# Patient Record
Sex: FEMALE | Employment: OTHER | URBAN - METROPOLITAN AREA
[De-identification: names, ages, dates, MRNs, and addresses within clinical notes are randomized per-mention and may not be internally consistent; named-entity substitution may affect disease eponyms.]

---

## 2017-12-22 ENCOUNTER — HOSPITAL ENCOUNTER (OUTPATIENT)
Dept: LAB | Age: 72
Discharge: HOME | End: 2017-12-22

## 2019-02-12 ENCOUNTER — OFFICE VISIT (OUTPATIENT)
Dept: NEUROLOGY | Age: 74
End: 2019-02-12

## 2019-02-12 VITALS
DIASTOLIC BLOOD PRESSURE: 68 MMHG | HEIGHT: 63 IN | OXYGEN SATURATION: 97 % | HEART RATE: 66 BPM | RESPIRATION RATE: 20 BRPM | SYSTOLIC BLOOD PRESSURE: 100 MMHG

## 2019-02-12 DIAGNOSIS — R29.898 WEAKNESS OF BOTH HANDS: ICD-10-CM

## 2019-02-12 DIAGNOSIS — R20.2 NUMBNESS AND TINGLING IN BOTH HANDS: Primary | ICD-10-CM

## 2019-02-12 DIAGNOSIS — R26.81 UNSTEADY GAIT: ICD-10-CM

## 2019-02-12 DIAGNOSIS — R29.898 WEAKNESS OF BOTH LEGS: ICD-10-CM

## 2019-02-12 DIAGNOSIS — R20.0 NUMBNESS AND TINGLING IN BOTH HANDS: Primary | ICD-10-CM

## 2019-02-12 DIAGNOSIS — F41.8 TEST ANXIETY: ICD-10-CM

## 2019-02-12 RX ORDER — AMLODIPINE BESYLATE 10 MG/1
10 TABLET ORAL
COMMUNITY
Start: 2019-01-29

## 2019-02-12 RX ORDER — ATORVASTATIN CALCIUM 20 MG/1
20 TABLET, FILM COATED ORAL
COMMUNITY
Start: 2018-12-31

## 2019-02-12 RX ORDER — INSULIN GLARGINE 100 [IU]/ML
50 INJECTION, SOLUTION SUBCUTANEOUS
COMMUNITY
Start: 2018-12-12

## 2019-02-12 RX ORDER — COLCHICINE 0.6 MG/1
0.6 TABLET ORAL AS NEEDED
Status: ON HOLD | COMMUNITY
Start: 2018-12-13 | End: 2022-07-19

## 2019-02-12 RX ORDER — GLIPIZIDE 5 MG/1
5 TABLET ORAL 2 TIMES DAILY
COMMUNITY
Start: 2018-12-11 | End: 2019-06-10

## 2019-02-12 RX ORDER — INDOMETHACIN 50 MG/1
50 CAPSULE ORAL
COMMUNITY
Start: 2019-01-29 | End: 2021-10-18 | Stop reason: ALTCHOICE

## 2019-02-12 RX ORDER — CARVEDILOL 12.5 MG/1
12.5 TABLET ORAL 2 TIMES DAILY
COMMUNITY
Start: 2018-12-07

## 2019-02-12 RX ORDER — DIAZEPAM 5 MG/1
TABLET ORAL
Qty: 1 TAB | Refills: 0 | Status: SHIPPED | OUTPATIENT
Start: 2019-02-12 | End: 2019-02-22

## 2019-02-12 NOTE — PROGRESS NOTES
Name:  Torrey Whitlock      :  1945    PCP:   Easton Price MD      Referring:  Misty Tyson MD/ PCP  MRN:   [id number]    Chief Complaint:   Chief Complaint   Patient presents with    Pain (Chronic)     Hand Pain       HISTORY OF PRESENT ILLNESS:     This is a 68 y.o. female with PMHx DM2, OA, MI (1998, PTCA, CABG), HTN, HLD, Gout, Fibroids. She is referred for evaluation of hand pains, hand weakness, and episodic leg pains. Pt says since 2018, started to have \"extreme burning\" in left hand (front/ back), not going up the arm. Over time she says it's become more numb, weaker, hard to . About 3 weeks after left hand started, developed same symptoms in right hand and at this point, right hand just as bad as left hand. No numbness up or down either arm, but occasionally right arm hurts (mostly from shoulder to elbow). She does have a hx of left shoulder problems and has been doing PT for that. Separately, she describes that legs started feeling weak around 1.5 months ago, fluctuating over that time. Denies any prior hx of numbness/ tingling/ burring in feet (ie DM neuropathy). She says she has historically been a slow walker but more recently her gait has worsened to where she's walking \"extremely slow,\" and sometimes when she turns she stumbles but avoids falling. Denies any any numbness in feet moving up legs. Says she was never told or diagnosed as having neuropathy in the past.  She repots that she's recently started to have a flare of pain across her lower back, not radiating down either leg. No bowel or bladder incontinence, though she report having chronic urinary urgency. Reviewed clinic note from PCP (19). Pt continued describing burning/ numbness feeling in both hands, not better after Physical Therapy. Was becoming harder to hold things, button a shirt.   She also complained of burning pain in the back of the left lower leg x 3 weeks, and sometimes in the back of right leg. Pt said that sometimes it felt like her legs were going to give out while walking, had to focus on her walking so she wouldn't fall. Complete Review of Systems:  Falls (\"buckle\"), joint pain, leg swelling, muscle pain, muscle weakness, snoring, vertigo (\"?\"); otherwise as noted in HPI     Allergies not on file  No past medical history on file. Current Outpatient Medications   Medication Sig Dispense Refill    carvedilol (COREG) 12.5 mg tablet       amLODIPine (NORVASC) 10 mg tablet       atorvastatin (LIPITOR) 20 mg tablet       colchicine 0.6 mg tablet       LANTUS SOLOSTAR U-100 INSULIN 100 unit/mL (3 mL) inpn       indomethacin (INDOCIN) 50 mg capsule       glipiZIDE (GLUCOTROL) 5 mg tablet       diazePAM (VALIUM) 5 mg tablet Sign MRI consent for first.  Then take 1 tab, 20 minutes before MRI test.  For anxiety-claustrophobic 1 Tab 0     No past surgical history on file. No family history on file. Social History     Socioeconomic History    Marital status:      Spouse name: Not on file    Number of children: Not on file    Years of education: Not on file    Highest education level: Not on file   Social Needs    Financial resource strain: Not on file    Food insecurity - worry: Not on file    Food insecurity - inability: Not on file    Transportation needs - medical: Not on file   [employer] needs - non-medical: Not on file   Occupational History    Not on file   Tobacco Use    Smoking status: Not on file   Substance and Sexual Activity    Alcohol use: Not on file    Drug use: Not on file    Sexual activity: Not on file   Other Topics Concern    Not on file   Social History Narrative    Not on file       PHYSICAL EXAM  Vitals:    02/12/19 0945   BP: 100/68   Pulse: 66   Resp: 20   SpO2: 97%   Height: 5' 3\" (1.6 m)       General:  Alert, cooperative, NAD   Head:  Normocephalic, atraumatic.    Eyes:  Conjunctivae/corneas clear   Lungs:  Heart:  Not examined, not relevant  Not examined, not relevant   Extremities: Mild edema in both lower legs   Skin: No rashes    Neurologic Exam       Language: normal  Memory:  Alert, oriented to person, place, situation    Cranial Nerves:  I: smell Not tested   II: visual fields Full to confrontation   II: pupils Equal, round, reactive to light   II: optic disc Not examined, not relevant   III,VII: ptosis none   III,IV,VI: extraocular muscles  normal   V: facial light touch sensation  normal   VII: facial muscle function  symmetric   VIII: hearing symmetric   IX: soft palate elevation  Not examined, not relevant   XI: sternocleidomastoid strength 5/5   XII: tongue  Not examined, not relevant      Motor: Symmetric, normal tone in all extremities. Symmetric bulk throughout. No muscle tenderness to touch. Upper exts: proximal strength 5/5, 4/5 strength in left hand, 4+/ 5 strength in right hand. Lower exts: 5/5 proximally and distally. Sensory:   Upper exts: intact temperature, prick, LT in all fingers, mild reduced vibration in left hand compared to elbow. Normal vibration in right upper ext. Lower exts: intact LT, prick, temp, and vibration in both feet and knees. Cerebellar: no rest, postural, or intention tremor  Reflexes: 1+ throughout  Plantar response: neutral bilaterally    Gait: slow gait, not shuffling, slightly unsteady. Mild difficulty with tandem gait. Romberg negative       ASSESSMENT AND PLAN    ICD-10-CM ICD-9-CM    1. Numbness and tingling in both hands R20.0 782.0 MRI CERV SPINE W WO CONT    R20.2  EMG LIMITED   2. Weakness of both hands R29.898 729.89 MRI CERV SPINE W WO CONT      EMG LIMITED      CK   3. Weakness of both legs R29.898 729.89 MRI CERV SPINE W WO CONT      EMG LIMITED      CK   4. Unsteady gait R26.81 781.2 MRI CERV SPINE W WO CONT      EMG LIMITED   5.  Test anxiety F41.8 300.09 diazePAM (VALIUM) 5 mg tablet       Burning pain in hands, reduced hand dexterity, occasional pain down right arm since October 2018. 5-6 week hx of slower gait, unsteady gait, episodes of legs almost buckling. Denies any numbness or pain radiating down either leg or or in the feet. Reports lower back has started to hurt (has had episodes of back pain in the past, last time was 1 year ago). DDx: CTS vs Cervial radiculopathy vs Cervical spinal stenosis or cord compress or cord lesion causing legs to be week and pain-weakness complaints in hands. Check MRI C-spine and EMG/ NCS both arms and 1 leg. Pt reports significant anxiety about EMG testing/ needle phobia and getting in MRI machine. Gave her Rx for Valium 5 mg tablet to take 20 minutes before getting into MRI machine and doing EMG test (same day). Advised her that she will need  to and from that testing as the Valium could make her drowsy. She expressed understanding.     Follow up in 6 weeks to go over results      Signed By: Nathan Alfonso MD     February 12, 2019

## 2019-02-12 NOTE — PATIENT INSTRUCTIONS
You should coordinate the scheduling of your MRI Cervical Spine with the EMG appointment. You want to do them on the same day, at the [facility] which is below the [facility] (where we do the EMG test). When the EMG technician contacts you to schedule the EMG test, you should request the last EMG slot (3 PM) of the day (a Monday or Wednesday). You should arrange the MRI test to be done around 430 or 5 PM to allow sufficient time to complete your EMG test.  Will see you back in 6 weeks from now to go over results.

## 2019-02-12 NOTE — PROGRESS NOTES
Patient is here for some hand pain she's been having. She states her hands get numb, they burn and have a very weak - noticed November. She states that it is getting worse. Both hands are affected.

## 2019-02-15 ENCOUNTER — TELEPHONE (OUTPATIENT)
Dept: NEUROLOGY | Age: 74
End: 2019-02-15

## 2019-02-15 NOTE — TELEPHONE ENCOUNTER
----- Message from Josh Rojas sent at 2/15/2019 11:57 AM EST -----  Regarding: /Telephone  Pt called requesting a call back in regards to scheduling ENG and MRI . Pt stated she did not receive a message on yesterday to schedule. Pt best contact number is [phone] or [phone] .

## 2019-02-21 ENCOUNTER — HOSPITAL ENCOUNTER (OUTPATIENT)
Age: 74
Setting detail: OBSERVATION
Discharge: NURSING, REHAB OR LONG-TERM CARE | End: 2019-02-26
Attending: EMERGENCY MEDICINE | Admitting: INTERNAL MEDICINE
Payer: MEDICARE

## 2019-02-21 ENCOUNTER — APPOINTMENT (OUTPATIENT)
Dept: GENERAL RADIOLOGY | Age: 74
End: 2019-02-21
Attending: PHYSICIAN ASSISTANT
Payer: MEDICARE

## 2019-02-21 DIAGNOSIS — M43.10 ANTEROLISTHESIS: ICD-10-CM

## 2019-02-21 DIAGNOSIS — M51.36 DDD (DEGENERATIVE DISC DISEASE), LUMBAR: ICD-10-CM

## 2019-02-21 DIAGNOSIS — I70.0 ATHEROSCLEROSIS OF AORTA (HCC): ICD-10-CM

## 2019-02-21 DIAGNOSIS — M54.50 ACUTE BILATERAL LOW BACK PAIN WITHOUT SCIATICA: Primary | ICD-10-CM

## 2019-02-21 PROCEDURE — 99285 EMERGENCY DEPT VISIT HI MDM: CPT

## 2019-02-21 PROCEDURE — 94761 N-INVAS EAR/PLS OXIMETRY MLT: CPT

## 2019-02-21 PROCEDURE — 72100 X-RAY EXAM L-S SPINE 2/3 VWS: CPT

## 2019-02-22 ENCOUNTER — APPOINTMENT (OUTPATIENT)
Dept: MRI IMAGING | Age: 74
End: 2019-02-22
Attending: NURSE PRACTITIONER
Payer: MEDICARE

## 2019-02-22 ENCOUNTER — APPOINTMENT (OUTPATIENT)
Dept: CT IMAGING | Age: 74
End: 2019-02-22
Attending: EMERGENCY MEDICINE
Payer: MEDICARE

## 2019-02-22 PROBLEM — I10 HTN (HYPERTENSION): Status: ACTIVE | Noted: 2019-02-22

## 2019-02-22 PROBLEM — M54.9 BACK PAIN: Status: ACTIVE | Noted: 2019-02-22

## 2019-02-22 PROBLEM — R53.81 DEBILITY: Status: ACTIVE | Noted: 2019-02-22

## 2019-02-22 PROBLEM — M10.9 GOUT: Status: ACTIVE | Noted: 2019-02-22

## 2019-02-22 PROBLEM — I25.10 CAD (CORONARY ARTERY DISEASE): Status: ACTIVE | Noted: 2019-02-22

## 2019-02-22 LAB
ALBUMIN SERPL-MCNC: 3.5 G/DL (ref 3.5–5)
ALBUMIN/GLOB SERPL: 0.9 {RATIO} (ref 1.1–2.2)
ALP SERPL-CCNC: 78 U/L (ref 45–117)
ALT SERPL-CCNC: 25 U/L (ref 12–78)
ANION GAP SERPL CALC-SCNC: 10 MMOL/L (ref 5–15)
APPEARANCE UR: CLEAR
AST SERPL-CCNC: 20 U/L (ref 15–37)
BACTERIA URNS QL MICRO: NEGATIVE /HPF
BASOPHILS # BLD: 0 K/UL (ref 0–0.1)
BASOPHILS NFR BLD: 0 % (ref 0–1)
BILIRUB SERPL-MCNC: 0.3 MG/DL (ref 0.2–1)
BILIRUB UR QL: NEGATIVE
BUN SERPL-MCNC: 23 MG/DL (ref 6–20)
BUN/CREAT SERPL: 21 (ref 12–20)
CALCIUM SERPL-MCNC: 9.4 MG/DL (ref 8.5–10.1)
CHLORIDE SERPL-SCNC: 103 MMOL/L (ref 97–108)
CO2 SERPL-SCNC: 27 MMOL/L (ref 21–32)
COLOR UR: NORMAL
CREAT SERPL-MCNC: 1.12 MG/DL (ref 0.55–1.02)
DIFFERENTIAL METHOD BLD: ABNORMAL
EOSINOPHIL # BLD: 0.2 K/UL (ref 0–0.4)
EOSINOPHIL NFR BLD: 3 % (ref 0–7)
EPITH CASTS URNS QL MICRO: NORMAL /LPF
ERYTHROCYTE [DISTWIDTH] IN BLOOD BY AUTOMATED COUNT: 13.3 % (ref 11.5–14.5)
EST. AVERAGE GLUCOSE BLD GHB EST-MCNC: 194 MG/DL
GLOBULIN SER CALC-MCNC: 3.7 G/DL (ref 2–4)
GLUCOSE BLD STRIP.AUTO-MCNC: 120 MG/DL (ref 65–100)
GLUCOSE BLD STRIP.AUTO-MCNC: 235 MG/DL (ref 65–100)
GLUCOSE BLD STRIP.AUTO-MCNC: 433 MG/DL (ref 65–100)
GLUCOSE SERPL-MCNC: 142 MG/DL (ref 65–100)
GLUCOSE UR STRIP.AUTO-MCNC: NEGATIVE MG/DL
HBA1C MFR BLD: 8.4 % (ref 4.2–6.3)
HCT VFR BLD AUTO: 36.8 % (ref 35–47)
HGB BLD-MCNC: 12 G/DL (ref 11.5–16)
HGB UR QL STRIP: NEGATIVE
HYALINE CASTS URNS QL MICRO: NORMAL /LPF (ref 0–5)
IMM GRANULOCYTES # BLD AUTO: 0 K/UL (ref 0–0.04)
IMM GRANULOCYTES NFR BLD AUTO: 0 % (ref 0–0.5)
KETONES UR QL STRIP.AUTO: NEGATIVE MG/DL
LEUKOCYTE ESTERASE UR QL STRIP.AUTO: NEGATIVE
LYMPHOCYTES # BLD: 4 K/UL (ref 0.8–3.5)
LYMPHOCYTES NFR BLD: 62 % (ref 12–49)
MCH RBC QN AUTO: 26.9 PG (ref 26–34)
MCHC RBC AUTO-ENTMCNC: 32.6 G/DL (ref 30–36.5)
MCV RBC AUTO: 82.5 FL (ref 80–99)
MONOCYTES # BLD: 0.6 K/UL (ref 0–1)
MONOCYTES NFR BLD: 9 % (ref 5–13)
NEUTS SEG # BLD: 1.7 K/UL (ref 1.8–8)
NEUTS SEG NFR BLD: 26 % (ref 32–75)
NITRITE UR QL STRIP.AUTO: NEGATIVE
NRBC # BLD: 0 K/UL (ref 0–0.01)
NRBC BLD-RTO: 0 PER 100 WBC
PH UR STRIP: 5.5 [PH] (ref 5–8)
PLATELET # BLD AUTO: 206 K/UL (ref 150–400)
PMV BLD AUTO: 10 FL (ref 8.9–12.9)
POTASSIUM SERPL-SCNC: 3.7 MMOL/L (ref 3.5–5.1)
PROT SERPL-MCNC: 7.2 G/DL (ref 6.4–8.2)
PROT UR STRIP-MCNC: NEGATIVE MG/DL
RBC # BLD AUTO: 4.46 M/UL (ref 3.8–5.2)
RBC #/AREA URNS HPF: NORMAL /HPF (ref 0–5)
SERVICE CMNT-IMP: ABNORMAL
SODIUM SERPL-SCNC: 140 MMOL/L (ref 136–145)
SP GR UR REFRACTOMETRY: 1.01 (ref 1–1.03)
UR CULT HOLD, URHOLD: NORMAL
UROBILINOGEN UR QL STRIP.AUTO: 0.2 EU/DL (ref 0.2–1)
WBC # BLD AUTO: 6.5 K/UL (ref 3.6–11)
WBC URNS QL MICRO: NORMAL /HPF (ref 0–4)

## 2019-02-22 PROCEDURE — 74011000250 HC RX REV CODE- 250: Performed by: EMERGENCY MEDICINE

## 2019-02-22 PROCEDURE — 74011250636 HC RX REV CODE- 250/636: Performed by: INTERNAL MEDICINE

## 2019-02-22 PROCEDURE — 94760 N-INVAS EAR/PLS OXIMETRY 1: CPT

## 2019-02-22 PROCEDURE — 85025 COMPLETE CBC W/AUTO DIFF WBC: CPT

## 2019-02-22 PROCEDURE — 74011250637 HC RX REV CODE- 250/637: Performed by: EMERGENCY MEDICINE

## 2019-02-22 PROCEDURE — 82962 GLUCOSE BLOOD TEST: CPT

## 2019-02-22 PROCEDURE — 36415 COLL VENOUS BLD VENIPUNCTURE: CPT

## 2019-02-22 PROCEDURE — 96374 THER/PROPH/DIAG INJ IV PUSH: CPT

## 2019-02-22 PROCEDURE — 97116 GAIT TRAINING THERAPY: CPT | Performed by: PHYSICAL THERAPIST

## 2019-02-22 PROCEDURE — 99218 HC RM OBSERVATION: CPT

## 2019-02-22 PROCEDURE — 74011250636 HC RX REV CODE- 250/636: Performed by: EMERGENCY MEDICINE

## 2019-02-22 PROCEDURE — 97162 PT EVAL MOD COMPLEX 30 MIN: CPT | Performed by: PHYSICAL THERAPIST

## 2019-02-22 PROCEDURE — 96361 HYDRATE IV INFUSION ADD-ON: CPT

## 2019-02-22 PROCEDURE — 83036 HEMOGLOBIN GLYCOSYLATED A1C: CPT

## 2019-02-22 PROCEDURE — 74011250637 HC RX REV CODE- 250/637: Performed by: INTERNAL MEDICINE

## 2019-02-22 PROCEDURE — 80053 COMPREHEN METABOLIC PANEL: CPT

## 2019-02-22 PROCEDURE — 72131 CT LUMBAR SPINE W/O DYE: CPT

## 2019-02-22 PROCEDURE — 96372 THER/PROPH/DIAG INJ SC/IM: CPT

## 2019-02-22 PROCEDURE — 97530 THERAPEUTIC ACTIVITIES: CPT | Performed by: PHYSICAL THERAPIST

## 2019-02-22 PROCEDURE — 81001 URINALYSIS AUTO W/SCOPE: CPT

## 2019-02-22 PROCEDURE — 72148 MRI LUMBAR SPINE W/O DYE: CPT

## 2019-02-22 PROCEDURE — 74011636637 HC RX REV CODE- 636/637: Performed by: INTERNAL MEDICINE

## 2019-02-22 RX ORDER — SODIUM CHLORIDE 0.9 % (FLUSH) 0.9 %
5-40 SYRINGE (ML) INJECTION AS NEEDED
Status: DISCONTINUED | OUTPATIENT
Start: 2019-02-22 | End: 2019-02-26 | Stop reason: HOSPADM

## 2019-02-22 RX ORDER — LIDOCAINE 4 G/100G
1 PATCH TOPICAL
Status: COMPLETED | OUTPATIENT
Start: 2019-02-22 | End: 2019-02-22

## 2019-02-22 RX ORDER — HYDROCODONE BITARTRATE AND ACETAMINOPHEN 5; 325 MG/1; MG/1
1 TABLET ORAL
Status: DISCONTINUED | OUTPATIENT
Start: 2019-02-22 | End: 2019-02-26 | Stop reason: HOSPADM

## 2019-02-22 RX ORDER — DEXAMETHASONE 4 MG/1
4 TABLET ORAL EVERY 8 HOURS
Status: COMPLETED | OUTPATIENT
Start: 2019-02-22 | End: 2019-02-23

## 2019-02-22 RX ORDER — HYDROCHLOROTHIAZIDE 25 MG/1
12.5 TABLET ORAL DAILY
Status: DISCONTINUED | OUTPATIENT
Start: 2019-02-22 | End: 2019-02-26 | Stop reason: HOSPADM

## 2019-02-22 RX ORDER — KETOROLAC TROMETHAMINE 30 MG/ML
15 INJECTION, SOLUTION INTRAMUSCULAR; INTRAVENOUS EVERY 6 HOURS
Status: DISCONTINUED | OUTPATIENT
Start: 2019-02-22 | End: 2019-02-22

## 2019-02-22 RX ORDER — ACETAMINOPHEN 325 MG/1
650 TABLET ORAL
Status: DISCONTINUED | OUTPATIENT
Start: 2019-02-22 | End: 2019-02-26 | Stop reason: HOSPADM

## 2019-02-22 RX ORDER — HYDROMORPHONE HYDROCHLORIDE 2 MG/ML
1 INJECTION, SOLUTION INTRAMUSCULAR; INTRAVENOUS; SUBCUTANEOUS
Status: DISCONTINUED | OUTPATIENT
Start: 2019-02-22 | End: 2019-02-26 | Stop reason: HOSPADM

## 2019-02-22 RX ORDER — METFORMIN HYDROCHLORIDE 1000 MG/1
1000 TABLET ORAL 2 TIMES DAILY
COMMUNITY

## 2019-02-22 RX ORDER — INSULIN GLARGINE 100 [IU]/ML
42 INJECTION, SOLUTION SUBCUTANEOUS
Status: DISCONTINUED | OUTPATIENT
Start: 2019-02-22 | End: 2019-02-26

## 2019-02-22 RX ORDER — SODIUM CHLORIDE 9 MG/ML
50 INJECTION, SOLUTION INTRAVENOUS CONTINUOUS
Status: DISCONTINUED | OUTPATIENT
Start: 2019-02-22 | End: 2019-02-26 | Stop reason: HOSPADM

## 2019-02-22 RX ORDER — COLCHICINE 0.6 MG/1
0.6 CAPSULE ORAL DAILY PRN
Status: DISCONTINUED | OUTPATIENT
Start: 2019-02-22 | End: 2019-02-26 | Stop reason: HOSPADM

## 2019-02-22 RX ORDER — LIDOCAINE 4 G/100G
1 PATCH TOPICAL EVERY 24 HOURS
Status: DISCONTINUED | OUTPATIENT
Start: 2019-02-23 | End: 2019-02-26 | Stop reason: HOSPADM

## 2019-02-22 RX ORDER — INSULIN GLARGINE 100 [IU]/ML
84 INJECTION, SOLUTION SUBCUTANEOUS
Status: DISCONTINUED | OUTPATIENT
Start: 2019-02-22 | End: 2019-02-22

## 2019-02-22 RX ORDER — KETOROLAC TROMETHAMINE 30 MG/ML
15 INJECTION, SOLUTION INTRAMUSCULAR; INTRAVENOUS
Status: DISPENSED | OUTPATIENT
Start: 2019-02-22 | End: 2019-02-23

## 2019-02-22 RX ORDER — INSULIN LISPRO 100 [IU]/ML
INJECTION, SOLUTION INTRAVENOUS; SUBCUTANEOUS
Status: DISCONTINUED | OUTPATIENT
Start: 2019-02-22 | End: 2019-02-26 | Stop reason: HOSPADM

## 2019-02-22 RX ORDER — LOSARTAN POTASSIUM AND HYDROCHLOROTHIAZIDE 12.5; 1 MG/1; MG/1
1 TABLET ORAL DAILY
COMMUNITY
End: 2021-10-18 | Stop reason: CLARIF

## 2019-02-22 RX ORDER — ONDANSETRON 2 MG/ML
4 INJECTION INTRAMUSCULAR; INTRAVENOUS
Status: DISCONTINUED | OUTPATIENT
Start: 2019-02-22 | End: 2019-02-26 | Stop reason: HOSPADM

## 2019-02-22 RX ORDER — DEXTROSE 50 % IN WATER (D50W) INTRAVENOUS SYRINGE
25-50 AS NEEDED
Status: DISCONTINUED | OUTPATIENT
Start: 2019-02-22 | End: 2019-02-26 | Stop reason: HOSPADM

## 2019-02-22 RX ORDER — MORPHINE SULFATE 4 MG/ML
2 INJECTION INTRAVENOUS
Status: DISCONTINUED | OUTPATIENT
Start: 2019-02-22 | End: 2019-02-22

## 2019-02-22 RX ORDER — KETOROLAC TROMETHAMINE 30 MG/ML
15 INJECTION, SOLUTION INTRAMUSCULAR; INTRAVENOUS
Status: COMPLETED | OUTPATIENT
Start: 2019-02-22 | End: 2019-02-22

## 2019-02-22 RX ORDER — ATORVASTATIN CALCIUM 20 MG/1
20 TABLET, FILM COATED ORAL DAILY
Status: DISCONTINUED | OUTPATIENT
Start: 2019-02-23 | End: 2019-02-23

## 2019-02-22 RX ORDER — IBUPROFEN 600 MG/1
600 TABLET ORAL
Status: COMPLETED | OUTPATIENT
Start: 2019-02-22 | End: 2019-02-22

## 2019-02-22 RX ORDER — AMLODIPINE BESYLATE 5 MG/1
10 TABLET ORAL DAILY
Status: DISCONTINUED | OUTPATIENT
Start: 2019-02-23 | End: 2019-02-23

## 2019-02-22 RX ORDER — ENOXAPARIN SODIUM 100 MG/ML
40 INJECTION SUBCUTANEOUS EVERY 24 HOURS
Status: DISCONTINUED | OUTPATIENT
Start: 2019-02-22 | End: 2019-02-26 | Stop reason: HOSPADM

## 2019-02-22 RX ORDER — MAGNESIUM SULFATE 100 %
4 CRYSTALS MISCELLANEOUS AS NEEDED
Status: DISCONTINUED | OUTPATIENT
Start: 2019-02-22 | End: 2019-02-26 | Stop reason: HOSPADM

## 2019-02-22 RX ORDER — GABAPENTIN 300 MG/1
300 CAPSULE ORAL
Status: ON HOLD | COMMUNITY
End: 2022-07-19

## 2019-02-22 RX ORDER — LOSARTAN POTASSIUM 50 MG/1
100 TABLET ORAL DAILY
Status: DISCONTINUED | OUTPATIENT
Start: 2019-02-22 | End: 2019-02-26 | Stop reason: HOSPADM

## 2019-02-22 RX ORDER — SODIUM CHLORIDE 0.9 % (FLUSH) 0.9 %
5-40 SYRINGE (ML) INJECTION EVERY 8 HOURS
Status: DISCONTINUED | OUTPATIENT
Start: 2019-02-22 | End: 2019-02-26 | Stop reason: HOSPADM

## 2019-02-22 RX ORDER — DOCUSATE SODIUM 100 MG/1
100 CAPSULE, LIQUID FILLED ORAL 2 TIMES DAILY
Status: DISCONTINUED | OUTPATIENT
Start: 2019-02-22 | End: 2019-02-26 | Stop reason: HOSPADM

## 2019-02-22 RX ORDER — GLIPIZIDE 5 MG/1
5 TABLET ORAL 2 TIMES DAILY WITH MEALS
Status: DISCONTINUED | OUTPATIENT
Start: 2019-02-22 | End: 2019-02-26

## 2019-02-22 RX ORDER — TIZANIDINE 2 MG/1
4 TABLET ORAL
Status: DISCONTINUED | OUTPATIENT
Start: 2019-02-22 | End: 2019-02-24

## 2019-02-22 RX ORDER — DIAZEPAM 5 MG/1
5 TABLET ORAL
Status: COMPLETED | OUTPATIENT
Start: 2019-02-22 | End: 2019-02-22

## 2019-02-22 RX ORDER — MAGNESIUM SULFATE 100 %
4 CRYSTALS MISCELLANEOUS AS NEEDED
Status: DISCONTINUED | OUTPATIENT
Start: 2019-02-22 | End: 2019-02-22

## 2019-02-22 RX ORDER — INSULIN LISPRO 100 [IU]/ML
INJECTION, SOLUTION INTRAVENOUS; SUBCUTANEOUS
Status: DISCONTINUED | OUTPATIENT
Start: 2019-02-22 | End: 2019-02-22

## 2019-02-22 RX ORDER — INDOMETHACIN 25 MG/1
50 CAPSULE ORAL DAILY PRN
Status: DISCONTINUED | OUTPATIENT
Start: 2019-02-22 | End: 2019-02-22

## 2019-02-22 RX ORDER — NALOXONE HYDROCHLORIDE 0.4 MG/ML
0.4 INJECTION, SOLUTION INTRAMUSCULAR; INTRAVENOUS; SUBCUTANEOUS AS NEEDED
Status: DISCONTINUED | OUTPATIENT
Start: 2019-02-22 | End: 2019-02-26 | Stop reason: HOSPADM

## 2019-02-22 RX ADMIN — TIZANIDINE 4 MG: 2 TABLET ORAL at 06:09

## 2019-02-22 RX ADMIN — SODIUM CHLORIDE 75 ML/HR: 900 INJECTION, SOLUTION INTRAVENOUS at 04:01

## 2019-02-22 RX ADMIN — INSULIN LISPRO 4 UNITS: 100 INJECTION, SOLUTION INTRAVENOUS; SUBCUTANEOUS at 11:48

## 2019-02-22 RX ADMIN — INSULIN GLARGINE 42 UNITS: 100 INJECTION, SOLUTION SUBCUTANEOUS at 21:01

## 2019-02-22 RX ADMIN — DIAZEPAM 5 MG: 5 TABLET ORAL at 00:59

## 2019-02-22 RX ADMIN — HYDROCODONE BITARTRATE AND ACETAMINOPHEN 1 TABLET: 5; 325 TABLET ORAL at 09:55

## 2019-02-22 RX ADMIN — HYDROCODONE BITARTRATE AND ACETAMINOPHEN 1 TABLET: 5; 325 TABLET ORAL at 06:09

## 2019-02-22 RX ADMIN — INSULIN GLARGINE 42 UNITS: 100 INJECTION, SOLUTION SUBCUTANEOUS at 21:00

## 2019-02-22 RX ADMIN — Medication 10 ML: at 21:06

## 2019-02-22 RX ADMIN — GLIPIZIDE 5 MG: 5 TABLET ORAL at 18:49

## 2019-02-22 RX ADMIN — ENOXAPARIN SODIUM 40 MG: 40 INJECTION SUBCUTANEOUS at 08:34

## 2019-02-22 RX ADMIN — IBUPROFEN 600 MG: 600 TABLET ORAL at 01:00

## 2019-02-22 RX ADMIN — HYDROCODONE BITARTRATE AND ACETAMINOPHEN 1 TABLET: 5; 325 TABLET ORAL at 14:38

## 2019-02-22 RX ADMIN — DOCUSATE SODIUM 100 MG: 100 CAPSULE, LIQUID FILLED ORAL at 08:34

## 2019-02-22 RX ADMIN — DOCUSATE SODIUM 100 MG: 100 CAPSULE, LIQUID FILLED ORAL at 18:49

## 2019-02-22 RX ADMIN — KETOROLAC TROMETHAMINE 15 MG: 30 INJECTION, SOLUTION INTRAMUSCULAR; INTRAVENOUS at 03:41

## 2019-02-22 RX ADMIN — Medication 10 ML: at 07:36

## 2019-02-22 RX ADMIN — DEXAMETHASONE 4 MG: 4 TABLET ORAL at 14:32

## 2019-02-22 RX ADMIN — LOSARTAN POTASSIUM 100 MG: 50 TABLET, FILM COATED ORAL at 14:32

## 2019-02-22 RX ADMIN — Medication 10 ML: at 14:33

## 2019-02-22 RX ADMIN — DEXAMETHASONE 4 MG: 4 TABLET ORAL at 08:38

## 2019-02-22 RX ADMIN — INSULIN LISPRO 12 UNITS: 100 INJECTION, SOLUTION INTRAVENOUS; SUBCUTANEOUS at 21:01

## 2019-02-22 RX ADMIN — DEXAMETHASONE 4 MG: 4 TABLET ORAL at 21:02

## 2019-02-22 RX ADMIN — HYDROCHLOROTHIAZIDE 12.5 MG: 25 TABLET ORAL at 14:32

## 2019-02-22 NOTE — DISCHARGE INSTRUCTIONS
Patient Education        Learning About Relief for Back Pain  What is back tension and strain? Back strain happens when you overstretch, or pull, a muscle in your back. You may hurt your back in an accident or when you exercise or lift something. Most back pain will get better with rest and time. You can take care of yourself at home to help your back heal.  What can you do first to relieve back pain? When you first feel back pain, try these steps:  · Walk. Take a short walk (10 to 20 minutes) on a level surface (no slopes, hills, or stairs) every 2 to 3 hours. Walk only distances you can manage without pain, especially leg pain. · Relax. Find a comfortable position for rest. Some people are comfortable on the floor or a medium-firm bed with a small pillow under their head and another under their knees. Some people prefer to lie on their side with a pillow between their knees. Don't stay in one position for too long. · Try heat or ice. Try using a heating pad on a low or medium setting, or take a warm shower, for 15 to 20 minutes every 2 to 3 hours. Or you can buy single-use heat wraps that last up to 8 hours. You can also try an ice pack for 10 to 15 minutes every 2 to 3 hours. You can use an ice pack or a bag of frozen vegetables wrapped in a thin towel. There is not strong evidence that either heat or ice will help, but you can try them to see if they help. You may also want to try switching between heat and cold. · Take pain medicine exactly as directed. ¨ If the doctor gave you a prescription medicine for pain, take it as prescribed. ¨ If you are not taking a prescription pain medicine, ask your doctor if you can take an over-the-counter medicine. What else can you do? · Stretch and exercise. Exercises that increase flexibility may relieve your pain and make it easier for your muscles to keep your spine in a good, neutral position. And don't forget to keep walking. · Do self-massage.  You can use self-massage to unwind after work or school or to energize yourself in the morning. You can easily massage your feet, hands, or neck. Self-massage works best if you are in comfortable clothes and are sitting or lying in a comfortable position. Use oil or lotion to massage bare skin. · Reduce stress. Back pain can lead to a vicious [place]: Distress about the pain tenses the muscles in your back, which in turn causes more pain. Learn how to relax your mind and your muscles to lower your stress. Where can you learn more? Go to http://toritoZenogenwayne.info/. Enter J210 in the search box to learn more about \"Learning About Relief for Back Pain. \"  Current as of: March 21, 2017  Content Version: 11.5  © 4807-0376 [employer]. Care instructions adapted under license by [employer] (which disclaims liability or warranty for this information). If you have questions about a medical condition or this instruction, always ask your healthcare professional. [place] [address] any warranty or liability for your use of this information. Patient Education        Learning About Degenerative Disc Disease  What is degenerative disc disease? Degenerative disc disease is not really a disease. It's a term used to describe the normal changes in your spinal discs as you age. Spinal discs are small, spongy discs that separate the bones (vertebrae) that make up the spine. The discs act as shock absorbers for the spine, so it can flex, bend, and twist.  Degenerative disc disease can take place in one or more places along the spine. It most often occurs in the discs in the lower back and the neck. What causes it? As we age, our spinal discs break down, or degenerate. This breakdown causes the symptoms of degenerative disc disease in some people. When the discs break down, they can lose fluid and dry out, and their outer layers can have tiny cracks or tears.  This leads to less padding and less space between the bones in the spine. The body reacts to this by making bony growths on the spine called bone spurs. These spurs can press on the spinal nerve roots or spinal cord. This can cause pain and can affect how well the nerves work. What are the symptoms? Many people with degenerative disc disease have no pain. But others have severe pain or other symptoms that limit their activities. Some of the most common symptoms are:  · Pain in the back or neck. Where the pain occurs depends on which discs are affected. · Pain that gets worse when you move, such as bending over, reaching up, or twisting. · Pain that may occur in the rear end (buttocks), arm, or leg if a nerve is pinched. · Numbness or tingling in your arm or leg. How is it diagnosed? A doctor can often diagnose degenerative disc disease while doing a physical exam. If your exam shows no signs of a serious condition, imaging tests (such as an X-ray) aren't likely to help your doctor find the cause of your symptoms. Sometimes degenerative disc disease is found when an X-ray is taken for another reason, such as an injury or other health problem. But even if the doctor finds degenerative disc disease, that doesn't always mean that you will have symptoms. How is it treated? There are several things you can do at home to manage pain from this problem. · To relieve pain, use ice or heat (whichever feels better) on the affected area. ¨ Put ice or a cold pack on the area for 10 to 20 minutes at a time. Put a thin cloth between the ice and your skin. ¨ Put a warm water bottle, a heating pad set on low, or a warm cloth on your back. Put a thin cloth between the heating pad and your skin. Do not go to sleep with a heating pad on your skin. · Ask your doctor if you can take acetaminophen (such as Tylenol) or nonsteroidal anti-inflammatory drugs, such as ibuprofen or naproxen. Your doctor can prescribe stronger medicines if needed.  Be safe with medicines. Read and follow all instructions on the label. · Get some exercise every day. Exercise is one of the best ways to help your back feel better and stay better. It's best to start each exercise slowly. You may notice a little soreness, and that's okay. But if an exercise makes your pain worse, stop doing it. Here are things you can try:  ¨ Walking. It's the simplest and maybe the best activity for your back. It gets your blood moving and helps your muscles stay strong. ¨ Exercises that gently stretch and strengthen your stomach, back, and leg muscles. The stronger those muscles are, the better they're able to protect your back. If you have constant or severe pain in your back or spine, you may need other treatments, such as physical therapy. In some cases, your doctor may suggest surgery. Follow-up care is a key part of your treatment and safety. Be sure to make and go to all appointments, and call your doctor if you are having problems. It's also a good idea to know your test results and keep a list of the medicines you take. Where can you learn more? Go to http://torito-wayne.info/. Enter H533 in the search box to learn more about \"Learning About Degenerative Disc Disease. \"  Current as of: March 21, 2017  Content Version: 11.5  © 9535-9232 [employer]. Care instructions adapted under license by [employer] (which disclaims liability or warranty for this information). If you have questions about a medical condition or this instruction, always ask your healthcare professional. [place] [address] any warranty or liability for your use of this information.

## 2019-02-22 NOTE — PROGRESS NOTES
Occupational therapy note:  Orders received, chart reviewed. Patient to hospital secondary to severe back pain. Patient with ortho consult noted. Will hold OT evaluation pending recommendations of ortho. Annika Fontaine MS OTR/L

## 2019-02-22 NOTE — ED NOTES
Bedside and Verbal shift change report given to Inocente Bravo (oncoming nurse) by Elin Trevino (offgoing nurse). Report included the following information SBAR, ED Summary, MAR, Recent Results and Cardiac Rhythm NSR.

## 2019-02-22 NOTE — PROGRESS NOTES
Problem: Mobility Impaired (Adult and Pediatric)  Goal: *Acute Goals and Plan of Care (Insert Text)  Physical Therapy Goals  Initiated 2/22/2019  1. Patient will move from supine to sit and sit to supine  in bed with modified independence within 7 day(s). 2.  Patient will transfer from bed to chair and chair to bed with modified independence using the least restrictive device within 7 day(s). 3.  Patient will perform sit to stand with modified independence within 7 day(s). 4.  Patient will ambulate with modified independence for 250 feet with the least restrictive device within 7 day(s). 5.  Patient will ascend/descend 4 stairs with 1 handrail(s) with minimal assistance/contact guard assist within 7 day(s). physical Therapy EVALUATION  Patient: Indira Desai (11 y.o. female)  Date: 2/22/2019  Primary Diagnosis: Back pain [M54.9]       Precautions: fall       ASSESSMENT :  Based on the objective data described below, the patient presents with acute on chronic LBP; bilat CTS with constant numbness, pain & burning in hands; impaired dexterity due to CTS; complaints of legs giving way with near falls in recent past. No radicular symptoms in legs, tho questioned hypertonicity with effort. Reports toes occasionally cramping/ curling, and appears to impact gait. Of late, she has been seeing PT (& OT?) for all these symptoms. Appointment with neurology in near future. Admit for severe acute BP after attempting to lift a folding chair - unable to stand erect, walk. Today she required assist for all mobility, min for bed, mod for transfers. All movements performed very slowly given fear of pain & legs giving way. Ambulated with rolling walker min A; cueing. Pain max of 6 when ambulating. Worked with proper techniques sit-stand, sit-supine, log rolling. She reports being indep with ADL, living alone in 2 story home with bedroom upstairs.  She has had no need or assistive devices for amb, but recently received bilat splints for CTS. Her dexterity is limited & question source viz peripheral or SC - needs further testing. Discharge recommendations tbd. If discharged before pain under better control, will need rolling walker. Given both bilat UE impairments, balance & gait disturbance, and the fact she lives alone, she may well benefit from a short inpatient rehab stay. Certainly can tolerate 3 hours therapy. Patient will benefit from skilled intervention to address the above impairments. Patients rehabilitation potential is considered to be Excellent  Factors which may influence rehabilitation potential include:   [x]         None noted  []         Mental ability/status  []         Medical condition  []         Home/family situation and support systems  []         Safety awareness  []         Pain tolerance/management  []         Other:      PLAN :  Recommendations and Planned Interventions:  [x]           Bed Mobility Training             [x]    Neuromuscular Re-Education  [x]           Transfer Training                   []    Orthotic/Prosthetic Training  [x]           Gait Training                         []    Modalities  [x]           Therapeutic Exercises           []    Edema Management/Control  [x]           Therapeutic Activities            [x]    Patient and Family Training/Education  []           Other (comment):    Frequency/Duration: Patient will be followed by physical therapy  6 times a week to address goals. Discharge Recommendations: Rehab or Outpatient  Further Equipment Recommendations for Discharge: rolling walker possible     SUBJECTIVE:   Patient stated If my sister wasn't there I would have fallen.     OBJECTIVE DATA SUMMARY:   HISTORY:    No past medical history on file. No past surgical history on file. Prior Level of Function/Home Situation: see above  Personal factors and/or comorbidities impacting plan of care: positive - determined, motivated, indep.   Chronic pain back    Home Situation  Home Environment: Private residence  One/Two Story Residence: Two story  Living Alone: Yes  Support Systems: Family member(s)  Patient Expects to be Discharged to[de-identified] Private residence  Current DME Used/Available at Home: None    EXAMINATION/PRESENTATION/DECISION MAKING:   Critical Behavior:              Hearing: Auditory  Auditory Impairment: None  Skin:  No problems noted  Edema: as above  Range Of Motion:  AROM: Generally decreased, functional   reduced , dorsi bilat        PROM: Within functional limits           Strength:    Strength: Generally decreased, functional                    Tone & Sensation:   Tone: (questionable; perhaps some hypertonus with effort)              Sensation: Impaired(hands)               Coordination:  Coordination: Generally decreased, functional(UE hand function)  Vision:      Functional Mobility:  Bed Mobility:  Rolling: Moderate assistance  Supine to Sit: Minimum assistance(HOB elevated)  Sit to Supine: Moderate assistance  Scooting: Stand-by assistance  Transfers:  Sit to Stand: Moderate assistance  Stand to Sit: Minimum assistance                       Balance:   Sitting: Intact  Standing: Impaired; Without support  Standing - Static: Fair  Standing - Dynamic : Poor  Ambulation/Gait Training:  Distance (ft): 100 Feet (ft)  Assistive Device: Walker, rolling  Ambulation - Level of Assistance: Minimal assistance        Gait Abnormalities: Decreased step clearance(no trunk rotation)        Base of Support: Widened     Speed/Siria: Slow  Step Length: Right shortened;Left shortened              Neuromotor:  Repetitions sit-stand  Supine -sit modifications  Work with rolling, bridging    Functional Measure:  Barthel Index:    Bathin  Bladder: 10  Bowels: 10  Groomin  Dressin  Feeding: 10  Mobility: 0  Stairs: 0  Toilet Use: 5  Transfer (Bed to Chair and Back): 10  Total: 55         The Barthel ADL Index: Guidelines  1.  The index should be used as a record of what a patient does, not as a record of what a patient could do. 2. The main aim is to establish degree of independence from any help, physical or verbal, however minor and for whatever reason. 3. The need for supervision renders the patient not independent. 4. A patient's performance should be established using the best available evidence. Asking the patient, friends/relatives and nurses are the usual sources, but direct observation and common sense are also important. However direct testing is not needed. 5. Usually the patient's performance over the preceding 24-48 hours is important, but occasionally longer periods will be relevant. 6. Middle categories imply that the patient supplies over 50 per cent of the effort. 7. Use of aids to be independent is allowed. Mehdi Hewitt., Barthel, D.W. (8511). Functional evaluation: the Barthel Index. [facility] (14)2. YADI Vila, Samra Silverio., Adolfo Gonzalez., [place], [address] [place] [address] (1999). Measuring the change indisability after inpatient rehabilitation; comparison of the responsiveness of the Barthel Index and Functional [place] Measure. Journal of Neurology, Neurosurgery, and Psychiatry, 66(4), 684-478. [place] Patience, [place].A, NEFTALI Haider.LENNOX, & Luis Alberto Thompson, M.A. (2004.) Assessment of post-stroke quality of life in cost-effectiveness studies: The usefulness of the Barthel Index and the EuroQoL-5D.  Quality of Life Research, 15, 286-91          Physical Therapy Evaluation Charge Determination   History Examination Presentation Decision-Making   MEDIUM  Complexity : 1-2 comorbidities / personal factors will impact the outcome/ POC  MEDIUM Complexity : 3 Standardized tests and measures addressing body structure, function, activity limitation and / or participation in recreation  MEDIUM Complexity : Evolving with changing characteristics  Other outcome measures barthel  MEDIUM      Based on the above components, the patient evaluation is determined to be of the following complexity level: MEDIUM    Pain:  Pain Scale 1: Numeric (0 - 10)  Pain Intensity 1: 2  Pain Location 1: Back  Pain Orientation 1: Lower  Pain Description 1: Dull  Pain Intervention(s) 1: Medication (see MAR)  Activity Tolerance:   No problem noted  Please refer to the flowsheet for vital signs taken during this treatment. After treatment:   []         Patient left in no apparent distress sitting up in chair  [x]         Patient left in no apparent distress in bed  [x]         Call bell left within reach  [x]         Nursing notified  []         Caregiver present  []         Bed alarm activated    COMMUNICATION/EDUCATION:   The patients plan of care was discussed with: Registered Nurse. [x]         Fall prevention education was provided and the patient/caregiver indicated understanding. [x]         Patient/family have participated as able in goal setting and plan of care. []         Patient/family agree to work toward stated goals and plan of care. []         Patient understands intent and goals of therapy, but is neutral about his/her participation. []         Patient is unable to participate in goal setting and plan of care.     Thank you for this referral.  Elinor Graves, PT   Time Calculation: 43 mins

## 2019-02-22 NOTE — PROGRESS NOTES
Reason for Admission:   Back pain                   RRAT Score:   8                  Plan for utilizing home health:     TBD pending PT/OT evaluations                     Likelihood of Readmission:  Low                         Transition of Care Plan:   TBD. Possibly HH  CM met with pt. Confirmed pt's home address and contact information. She lives alone in a two story home with three entry steps. PTA pt was independent with ADLs and driving. She reports having a good support system- two sons in the area and a sister in [facility]. Pt has prescription drug coverage and uses [address] Pt does not own any DME. Pt informed of her observation status. Medicare observation letters reviewed and signed. CM will follow for d/c needs. Apple Lopez LCSW    Care Management Interventions  PCP Verified by CM: Yes(Dr. Hernando Richard; no nurse navigator)  Palliative Care Criteria Met (RRAT>21 & CHF Dx)?: No  Physical Therapy Consult: Yes  Occupational Therapy Consult: Yes  Speech Therapy Consult: No  Current Support Network: Lives Alone  Confirm Follow Up Transport: Family  Plan discussed with Pt/Family/Caregiver:  Yes

## 2019-02-22 NOTE — PROGRESS NOTES
1155: Patient requesting restart of gout medications. Notified Dr. Leopoldo Freeze. Orders to restart colcichine and indomethacin. Orders placed. TRANSFER - OUT REPORT:    Verbal report given to [facility] RN(name) on Herbie Gaston  being transferred to 4th floor(unit) for routine progression of care       Report consisted of patients Situation, Background, Assessment and   Recommendations(SBAR). Information from the following report(s) SBAR, Kardex, Intake/Output, MAR and Recent Results was reviewed with the receiving nurse. Lines:   Peripheral IV 02/22/19 Right Antecubital (Active)   Site Assessment Clean, dry, & intact 2/22/2019 10:55 AM   Phlebitis Assessment 0 2/22/2019 10:55 AM   Infiltration Assessment 0 2/22/2019 10:55 AM   Dressing Status Clean, dry, & intact 2/22/2019 10:55 AM   Dressing Type Transparent 2/22/2019 10:55 AM   Hub Color/Line Status Pink; Infusing 2/22/2019 10:55 AM   Alcohol Cap Used Yes 2/22/2019 10:55 AM        Opportunity for questions and clarification was provided.       Patient transported with:   Registered Nurse

## 2019-02-22 NOTE — PROGRESS NOTES
Reviewed chart. Patient admitted with severe back pain. Ortho is consulted - will await their recommendations before proceeding with Physical Therapy Evaluation.

## 2019-02-22 NOTE — ED NOTES
Ambulated patient, patient relying heavily on two nurses to walk. Pt. States still feeling pain with walking, states lido-derm patch is not helping. Dr. James Stratton informed.

## 2019-02-22 NOTE — PROGRESS NOTES
Nutrition Assessment:    RECOMMENDATIONS/INTERVENTION(S):   1. Continue Consistent CHO. 2. If intake <50% will revisit the need for supplements/snacks. 3. Monitor intake, wt, BG control and plan of care. ASSESSMENT:   2/22: Received consult for general nutrition management and supplements for patient admitted for back pain. PMHx of CAD, GERD, DM, HTN. BG [id number] with an A1C of 8.4. Attempted to visit x 2 but busy with cares. Appears to be eating >75% of meals. RN reporting fair appetite. No weight history on file. BMI Obesity class I. Ortho consult pending for back pain. MST referral denied unintentional wt loss or poor PO intake PTA. Meds: colace, statin, lantus, lispro, glipizide, NS IVF. Diet Order: Consistent carb  % Eaten:    Patient Vitals for the past 72 hrs:   % Diet Eaten   02/22/19 1432 85 %   02/22/19 0956 75 %       Pertinent Medications: [x] Reviewed  Chemistries: [x] Reviewed    Anthropometrics: Height: 5' 3\" (160 cm) Weight: 86.2 kg (190 lb)    IBW (%IBW):   ( ) UBW (%UBW):   (  %)    BMI: Body mass index is 33.66 kg/m². This BMI is indicative of:   [] Underweight    [] Normal    [] Overweight    []  Obesity    []  Extreme Obesity (BMI>40)  Estimated Nutrition Needs (Based on): 3901 Kcals/day(BMR 1336 x1.2 AF) , 69 g(-86g (0.8-1.0g/kg actual bw)) Protein  Carbohydrate: At Least 130 g/day  Fluids: 1600 ml (1mL/kcal)    Last BM:  2/21      Bowel Sounds: active  Skin:    [x] Intact   [] Incision  [] Breakdown   [] DTI   [] Tears/Excoriation/Abrasion  []Edema [] Other:    Wt Readings from Last 30 Encounters:   02/21/19 86.2 kg (190 lb)      NUTRITION DIAGNOSES:   Problem:  Altered nutrition-related lab values     Etiology: related to inadequate BG control     Signs/Symptoms: as evidenced by A1c of 8.4,       NUTRITION INTERVENTIONS:  Meals/Snacks: General/healthful diet                  GOAL:   Patient to consume % of nutritionally adequate meals with BG <180mg/dL in the next 3-5 days    Cultural, Confucianist, or Ethnic Dietary Needs: None     EDUCATION & DISCHARGE NEEDS:    [] None Identified   [] Identified and Education Provided/Documented   [x] Identified and Pt declined/was not appropriate      [x] Interdisciplinary Care Plan Reviewed/Documented    [x] Discharge Needs: consistent CHO diet, outpatient RD/CDE f/u PRN   [] No Nutrition Related Discharge Needs    NUTRITION RISK:   Pt Is At Nutrition Risk  [x]     No Nutrition Risk Identified  []       PT SEEN FOR:    [x]  MD Consult: []Calorie Count      []Diabetic Diet Education        []Diet Education     []Electrolyte Management     [x]General Nutrition Management and Supplements     []Management of Tube Feeding     []TPN Recommendations    []  RN Referral:  []MST score >=2     []Enteral/Parenteral Nutrition PTA     []Pregnant: Gestational DM or Multigestation                 [] Pressure Ulcer    []  Low BMI      []  Length of Stay       [] Dysphagia Diet         [] Ventilator  []  Follow-up     Previous Recommendations:   [] Implemented          [] Not Implemented          [x] Not Applicable    Previous Goal:   [] Met              [] Progressing Towards Goal              [] Not Progressing Towards Goal   [x] Not Applicable            Charlette Blount, [address] [place] [address]  Pager [phone]  Office [phone]

## 2019-02-22 NOTE — ED PROVIDER NOTES
68 y.o. female with no significant past medical history presents in wheelchair and accompanied by sister with chief complaint of mid/lower back pain, initially a 10/10 in severity, onset last night around 5 PM s/p \"reaching out for a chair\" while trying to get to the bathroom. Pt states that the pain is exacerbated with both walking and moving, and relieved when sitting down. She reports that her pain is currently a 7/10 in severity. She indicates that she has tried hot compresses and Tylenol with some relief. Pt denies any h/o back pain, noting that she has never seen an orthopedic specialist. Pt denies being on any anticoagulation or antiplatelet therapy, but adds that she takes daily 81 mg ASA. Of note, the pt lives with her sister at the moment, but indicates that she normally lives at home. Pt denies any weakness or abd pain at this time. There are no other acute medical concerns at this time. She arrived via EMS. Social hx: None reported  PCP: Bella Moser MD    Note written by Rosenda Mcclure, as dictated by Sandra Kilpatrick MD 12:39 AM        The history is provided by the patient. No  was used. No past medical history on file. No past surgical history on file. No family history on file.     Social History     Socioeconomic History    Marital status:      Spouse name: Not on file    Number of children: Not on file    Years of education: Not on file    Highest education level: Not on file   Social Needs    Financial resource strain: Not on file    Food insecurity - worry: Not on file    Food insecurity - inability: Not on file    Transportation needs - medical: Not on file   [employer] needs - non-medical: Not on file   Occupational History    Not on file   Tobacco Use    Smoking status: Not on file   Substance and Sexual Activity    Alcohol use: Not on file    Drug use: Not on file    Sexual activity: Not on file   Other Topics Concern    Not on file   Social History Narrative    Not on file         ALLERGIES: Patient has no known allergies. Review of Systems   Constitutional: Negative for appetite change and fever. HENT: Negative for congestion, nosebleeds and sore throat. Eyes: Negative for discharge. Respiratory: Negative for cough and shortness of breath. Cardiovascular: Negative for chest pain. Gastrointestinal: Negative for abdominal pain, diarrhea, nausea and vomiting. Genitourinary: Negative for dysuria. Musculoskeletal: Positive for back pain, gait problem (painful) and myalgias (BLE pain). Skin: Negative for rash. Neurological: Negative for weakness and headaches. Hematological: Negative for adenopathy. Psychiatric/Behavioral: Negative. All other systems reviewed and are negative. Vitals:    02/21/19 2210   BP: 151/65   Pulse: 64   Resp: 17   Temp: 98.9 °F (37.2 °C)   SpO2: 98%   Weight: 86.2 kg (190 lb)   Height: 5' 3\" (1.6 m)            Physical Exam   Constitutional: She is oriented to person, place, and time. She appears well-developed and well-nourished. HENT:   Head: Normocephalic and atraumatic. Mouth/Throat: Oropharynx is clear and moist.   Eyes: Conjunctivae are normal.   Neck: Normal range of motion. Neck supple. Cardiovascular: Normal rate, regular rhythm and normal heart sounds. Pulmonary/Chest: Effort normal and breath sounds normal.   Abdominal: Soft. Bowel sounds are normal. There is no tenderness. Musculoskeletal: Normal range of motion. She exhibits tenderness. She exhibits no edema. Back:    Neurological: She is alert and oriented to person, place, and time. Skin: Skin is warm and dry. Psychiatric: She has a normal mood and affect. Her behavior is normal.   Nursing note and vitals reviewed.       MDM  Number of Diagnoses or Management Options  Acute bilateral low back pain without sciatica:   Anterolisthesis:   Atherosclerosis of aorta ([address]):   DDD (degenerative disc disease), lumbar:   Diagnosis management comments:   Ddx: back spasm, DDD, acute back pain       Amount and/or Complexity of Data Reviewed  Tests in the radiology section of CPT®: ordered and reviewed  Review and summarize past medical records: yes  Discuss the patient with other providers: yes    Patient Progress  Patient progress: stable         Procedures    The patient's pain has not been controllable in the ED. She is unable to ambulate without considerable assistance. Will admit. CONSULT:  Dr. Danni Olivares - will admit    A/P:  1.  Intractable back pain

## 2019-02-22 NOTE — PROGRESS NOTES
[facility]: MED RECONCILIATION    Comments/Recommendations:     · Patient able to confirm name, , allergies and preferred pharmacy  · Patient reports poor adherence to medications and takes some medications including metformin, gabapentin and colchicine as needed  · Has reported low blood glucose when taking metformin with insulin and glipizide, does not check BG regularly but says they are good, cannot give me a number. Please note that pt is on beta-blocker and can mask signs and symptoms of hypoglycemia     Medications added:     · Gabapentin  · Metformin  · Losartan HCTZ    Medications removed:    · Diazepam  - for MRI     Medications adjusted:    · Instructions added to multiple medications per pt     Information obtained from: Patient    Allergies: Patient has no known allergies. Prior to Admission Medications:     Prior to Admission Medications   Prescriptions Last Dose Informant Patient Reported? Taking? LANTUS SOLOSTAR U-100 INSULIN 100 unit/mL (3 mL) inpn 2019 at Unknown time Self Yes Yes   Si Units by SubCUTAneous route daily. amLODIPine (NORVASC) 10 mg tablet 2019 at am Self Yes Yes   Sig: Take 10 mg by mouth daily. atorvastatin (LIPITOR) 20 mg tablet 2019 at Unknown time Self Yes Yes   Sig: Take 20 mg by mouth daily. carvedilol (COREG) 12.5 mg tablet 2019 at Unknown time Self Yes Yes   Sig: Take 12.5 mg by mouth two (2) times a day. colchicine 0.6 mg tablet 2019 at Unknown time Self Yes Yes   Sig: Take 0.6 mg by mouth as needed. gabapentin (NEURONTIN) 100 mg capsule 2019 at Unknown time Self Yes Yes   Sig: Take 100 mg by mouth two (2) times daily as needed. glipiZIDE (GLUCOTROL) 5 mg tablet 2019 at Unknown time Self Yes Yes   Sig: Take 5 mg by mouth two (2) times a day. indomethacin (INDOCIN) 50 mg capsule 2019 at Unknown time Self Yes Yes   Sig: Take 50 mg by mouth as needed.    losartan-hydroCHLOROthiazide (HYZAAR) 100-12.5 mg per tablet 2/21/2019 at Unknown time Self Yes Yes   Sig: Take 1 Tab by mouth daily. metFORMIN (GLUCOPHAGE) 1,000 mg tablet 2/21/2019 at Unknown time Self Yes Yes   Sig: Take 1,000 mg by mouth two (2) times a day. Facility-Administered Medications: None           [employer] ALCIDES    Clinical Staff Pharmacist  [address] [place] [address]  [phone]

## 2019-02-22 NOTE — PROGRESS NOTES
Please see Dr. Mary Jeffery note for details. Patient was admitted this AM.  Still with severe back pain. CT w/ L4-L5 foraminal stenosis. Might benefit from [facility]. Will start dexamethasone, IV dilaudid prn. Consult Ortho, PT/OT.   Pharm to perform med rec

## 2019-02-22 NOTE — DIABETES MGMT
DTC Consult Note    Recommendations/ Comments: BG's 120 mg/dL, 235 mg/dL  Noted begun on steroids, Dexamethasone 4 mg Q 6 hours    If appropriate, please consider   Titrate Lantus to achieve BG's < 180 mg/dl  Observe response to po intake - may require lispro AC while on steroids, start with 8 units AC    Current hospital DM medication:   Lantus 84 units at bedtime  Glipizide 5 mg BID  Lispro resistant correction scale    For discharge - she will need Rx for Contour Next EZ test strips and lancets    Consult received for:  [x]             Assessment of home management                [x]      Medication Recommendations                []             Meter/monitoring     []             Insulin instruction     []             New diagnosis     []             Outpatient education     []             Insulin pump patient     []             Insulin infusion     []             DKA/HHS    Chart reviewed and initial evaluation complete on Tabitha Everett. Came to ER with back pain    Patient is a 68 y.o. female with known DM on Glipizide 5 mg BID and Lantus 84 units daily at home  Pt reports taking her medication routinely    BG monitoring at home - she states she has a meter but does not test every day. She states her results are usually \"pretty good. The only hypoglycemia was the day she had a colososcopy    Assessed and instructed patient on the following:   ·  interpretation of lab results, A1c 8.4% equivalent to AV  mg/dL  · blood sugar goals,   · complications of diabetes mellitus,   · hypoglycemia prevention and treatment,    · Effect of steroids on BG explained,   · SMBG skills, she states her meter is very old - Given a new meter today Contour Next EZ  · nutrition - reviewed guidepin using plate method  · referred to Diabetes Educator - she will consider it - she is in a lot of pain now    Encouraged the following:   · Test BG 2x/day fasting and bedtime  · F/U with PCP DR. Jovani Byers  · Consider class at [facility] when feeling better      Provided patient with the following: [x]             Survival skills education materials               []             Insulin education materials               []             CHO counting education materials               [x]             Outpatient DTC contact number               [x]             Glucometer Contour Next EZ                 Discussed with patient and/or family need for follow up appointment for diabetes management after discharge. A1c:   Lab Results   Component Value Date/Time    Hemoglobin A1c 8.4 (H) 02/22/2019 03:52 AM       Recent Glucose Results:   Lab Results   Component Value Date/Time     (H) 02/22/2019 03:52 AM    GLUCPOC 235 (H) 02/22/2019 11:38 AM    GLUCPOC 120 (H) 02/22/2019 05:40 AM        Lab Results   Component Value Date/Time    Creatinine 1.12 (H) 02/22/2019 03:52 AM     Estimated Creatinine Clearance: 46.5 mL/min (A) (based on SCr of 1.12 mg/dL (H)). Active Orders   Diet    DIET DIABETIC CONSISTENT CARB Regular        PO intake:   Patient Vitals for the past 72 hrs:   % Diet Eaten   02/22/19 1432 85 %   02/22/19 0956 75 %       Will continue to follow as needed. Thank you.   Hakeem Ely RN, CDE  Pager [phone]      Time spent: 25 min

## 2019-02-22 NOTE — CONSULTS
Orthopedic History and Physical     Patient: Jason Ocampo MRN: [id number]  SSN: [id number]    YOB: 1945  Age: 68 y.o. Sex: female          Subjective:     Patient is a 68 y.o.  female who complains of acute on chronic severe low back pain. Pt with hx of CAD, HTN, gout. Pt reports that yesterday she had leaned over to  a folding chair and felt severe pain in her low back. She was barely able to move and required assistance from family members to get around. She presented to the ER for further evaluation. CT of her lumbar spine showed multilevel diffuse disc bulge, L4-5 moderate right foraminal stenosis and multilevel mild stenoses. Pt was started on IV steroids and her pain is better tolerated today. Pt still reports her pain a 8 on scale 1-10 when attempting to ambulate. Pt denies saddle anesthesia, denies radicular pain, denies tingling/ numbness bilateral LE. Pt normally does not use assistive devices for ambulation. Pt lives alone. Pt is generally active. Patient Active Problem List    Diagnosis Date Noted    Back pain 02/22/2019    Debility 02/22/2019    CAD (coronary artery disease) 02/22/2019    HTN (hypertension) 02/22/2019    Gout 02/22/2019     No past medical history on file. No past surgical history on file. Prior to Admission medications    Medication Sig Start Date End Date Taking? Authorizing Provider   metFORMIN (GLUCOPHAGE) 1,000 mg tablet Take 1,000 mg by mouth two (2) times a day. Yes Provider, Historical   losartan-hydroCHLOROthiazide (HYZAAR) 100-12.5 mg per tablet Take 1 Tab by mouth daily. Yes Provider, Historical   gabapentin (NEURONTIN) 100 mg capsule Take 100 mg by mouth two (2) times daily as needed. Yes Provider, Historical   carvedilol (COREG) 12.5 mg tablet Take 12.5 mg by mouth two (2) times a day. 12/7/18  Yes Provider, Historical   amLODIPine (NORVASC) 10 mg tablet Take 10 mg by mouth daily.  1/29/19  Yes Provider, Historical   atorvastatin (LIPITOR) 20 mg tablet Take 20 mg by mouth daily. 12/31/18  Yes Provider, Historical   colchicine 0.6 mg tablet Take 0.6 mg by mouth as needed. 12/13/18  Yes Provider, Historical   LANTUS SOLOSTAR U-100 INSULIN 100 unit/mL (3 mL) inpn 84 Units by SubCUTAneous route daily. 12/12/18  Yes Provider, Historical   indomethacin (INDOCIN) 50 mg capsule Take 50 mg by mouth as needed. 1/29/19  Yes Provider, Historical   glipiZIDE (GLUCOTROL) 5 mg tablet Take 5 mg by mouth two (2) times a day.  12/11/18  Yes Provider, Historical     Current Facility-Administered Medications   Medication Dose Route Frequency    sodium chloride (NS) flush 5-40 mL  5-40 mL IntraVENous Q8H    sodium chloride (NS) flush 5-40 mL  5-40 mL IntraVENous PRN    naloxone (NARCAN) injection 0.4 mg  0.4 mg IntraVENous PRN    0.9% sodium chloride infusion  50 mL/hr IntraVENous CONTINUOUS    acetaminophen (TYLENOL) tablet 650 mg  650 mg Oral Q4H PRN    HYDROcodone-acetaminophen (NORCO) 5-325 mg per tablet 1 Tab  1 Tab Oral Q4H PRN    ondansetron (ZOFRAN) injection 4 mg  4 mg IntraVENous Q4H PRN    docusate sodium (COLACE) capsule 100 mg  100 mg Oral BID    enoxaparin (LOVENOX) injection 40 mg  40 mg SubCUTAneous Q24H    dextrose (D50W) injection syrg 12.5-25 g  25-50 mL IntraVENous PRN    tiZANidine (ZANAFLEX) tablet 4 mg  4 mg Oral Q6H PRN    [START ON 2/23/2019] lidocaine 4 % patch 1 Patch  1 Patch TransDERmal Q24H    insulin glargine (LANTUS) injection 42 Units  42 Units SubCUTAneous QHS    And    insulin glargine (LANTUS) injection 42 Units  42 Units SubCUTAneous QHS    insulin lispro (HUMALOG) injection   SubCUTAneous AC&HS    glucose chewable tablet 16 g  4 Tab Oral PRN    dextrose (D50W) injection syrg 12.5-25 g  25-50 mL IntraVENous PRN    glucagon (GLUCAGEN) injection 1 mg  1 mg IntraMUSCular PRN    ketorolac (TORADOL) injection 15 mg  15 mg IntraVENous Q6H PRN    dexamethasone (DECADRON) tablet 4 mg  4 mg Oral Q8H    HYDROmorphone (PF) (DILAUDID) injection 1 mg  1 mg IntraVENous Q4H PRN    [START ON 2019] amLODIPine (NORVASC) tablet 10 mg  10 mg Oral DAILY    [START ON 2019] atorvastatin (LIPITOR) tablet 20 mg  20 mg Oral DAILY    glipiZIDE (GLUCOTROL) tablet 5 mg  5 mg Oral BID WITH MEALS    colchicine (MITIGARE) capsule 0.6 mg  0.6 mg Oral DAILY PRN    losartan (COZAAR) tablet 100 mg  100 mg Oral DAILY    And    hydroCHLOROthiazide (HYDRODIURIL) tablet 12.5 mg  12.5 mg Oral DAILY      No Known Allergies   Social History     Tobacco Use    Smoking status: Not on file   Substance Use Topics    Alcohol use: Not on file      No family history on file. Review of Systems  A comprehensive review of systems was negative except for that written in the HPI. Objective:     Patient Vitals for the past 1 hrs:   BP Temp Pulse Resp SpO2   19 1613 124/74 98.3 °F (36.8 °C) 77 18 98 %     Temp (24hrs), Av.4 °F (36.9 °C), Min:98.2 °F (36.8 °C), Max:98.9 °F (37.2 °C)      EXAM:   Physical Exam:   Patient appears generally well, mood and affect are appropriate and pleasant. Pt resting in bed in NAD. Extremities: Pt able to sit up in bed with minimal assistance. Pain to palpation along lumbar spine. No erythema. Calves soft/ nontender. Negative SLR bilaterally. 5/5 strength throughout bilateral LE. Negative clonus. DTR +25. Vascular: 2+ dorsalis pedis pulses bilaterally. Imaging Review    INDICATION:  Severe low back pain limits ambulation.      COMPARISON: No comparison CT or MRI.     TECHNIQUE: Helical CT imaging of the lumbar spine was performed. Coronal and  sagittal reconstructions were obtained. CT dose reduction was achieved through  the use of a standardized protocol tailored for this examination and automatic  exposure control for dose modulation.     CONTRAST: None     FINDINGS:     3 mm anterior subluxation of L4-5 is secondary to chronic facet arthrosis.   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 No other  subluxation in this position. No acute fracture or compression deformity. There  is osseous ankylosis of the right L5 transverse process to the right iliac bone. Degenerative disc disease is moderate at L5-S1.      Aorta is atherosclerotic without aneurysm. No evidence of hydronephrosis. Mild  diffuse atrophy of the paraspinal musculature.     Lower thoracic spine: No herniation or stenosis.     L1-2: No herniation or stenosis.     L2-3: Diffuse disc bulge, facet arthrosis, and thickened ligamentum flavum. Mild  central spinal canal stenosis.     L3-4: Diffuse disc bulge, facet arthrosis, and thickened ligamentum flavum. Mild  central spinal canal stenosis.     L4-5: Diffuse disc bulge and facet arthrosis. Moderate right foraminal stenosis. Mild central spinal canal stenosis.     L5-S1: Diffuse disc bulge without stenosis.     IMPRESSION  IMPRESSION:     1. No fracture. 2. L4-5 moderate right foraminal stenosis.  Multilevel mild stenoses.       Labs:   Recent Results (from the past 12 hour(s))   GLUCOSE, POC    Collection Time: 02/22/19  5:40 AM   Result Value Ref Range    Glucose (POC) 120 (H) 65 - 100 mg/dL    Performed by Yosef Stain    URINALYSIS W/MICROSCOPIC    Collection Time: 02/22/19  6:14 AM   Result Value Ref Range    Color YELLOW/STRAW      Appearance CLEAR CLEAR      Specific gravity 1.013 1.003 - 1.030      pH (UA) 5.5 5.0 - 8.0      Protein NEGATIVE  NEG mg/dL    Glucose NEGATIVE  NEG mg/dL    Ketone NEGATIVE  NEG mg/dL    Bilirubin NEGATIVE  NEG      Blood NEGATIVE  NEG      Urobilinogen 0.2 0.2 - 1.0 EU/dL    Nitrites NEGATIVE  NEG      Leukocyte Esterase NEGATIVE  NEG      WBC 0-4 0 - 4 /hpf    RBC 0-5 0 - 5 /hpf    Epithelial cells FEW FEW /lpf    Bacteria NEGATIVE  NEG /hpf    Hyaline cast 0-2 0 - 5 /lpf   URINE CULTURE HOLD SAMPLE    Collection Time: 02/22/19  6:14 AM   Result Value Ref Range    Urine culture hold URINE ON HOLD IN MICROBIOLOGY DEPT FOR 3 DAYS. IF UNPRESERVED URINE IS SUBMITTED, IT CANNOT BE USED FOR ADDITIONAL TESTING AFTER 24 HRS, RECOLLECTION WILL BE REQUIRED. GLUCOSE, POC    Collection Time: 02/22/19 11:38 AM   Result Value Ref Range    Glucose (POC) 235 (H) 65 - 100 mg/dL    Performed by Frankie Naik        Assessment:     Patient Active Problem List    Diagnosis Date Noted    Back pain 02/22/2019    Debility 02/22/2019    CAD (coronary artery disease) 02/22/2019    HTN (hypertension) 02/22/2019    Gout 02/22/2019         Plan:   68year old female with acute on chronic lumbago without neuro deficits. CT shows L4-5 moderate right foraminal stenosis. Multilevel mild stenoses/ DDD. Vin Dials Agree with steroids IV x 4 doses/ pain control as needed. Proceed with MRI of lumbar spine for further evaluation. Proceed with PT/OT for ambulation. Will follow up in am for reassessment.      Discussed with Dr. Rolly Olivo, NP  Orthopaedic Surgery Nurse Practitioner

## 2019-02-22 NOTE — ED TRIAGE NOTES
Pt. States started with pain in mid lower back this evening after reaching for a folding chair, states painful to walk or move.

## 2019-02-23 LAB
ANION GAP SERPL CALC-SCNC: 10 MMOL/L (ref 5–15)
BUN SERPL-MCNC: 27 MG/DL (ref 6–20)
BUN/CREAT SERPL: 24 (ref 12–20)
CALCIUM SERPL-MCNC: 9.3 MG/DL (ref 8.5–10.1)
CHLORIDE SERPL-SCNC: 101 MMOL/L (ref 97–108)
CO2 SERPL-SCNC: 25 MMOL/L (ref 21–32)
CREAT SERPL-MCNC: 1.11 MG/DL (ref 0.55–1.02)
ERYTHROCYTE [DISTWIDTH] IN BLOOD BY AUTOMATED COUNT: 13.1 % (ref 11.5–14.5)
GLUCOSE BLD STRIP.AUTO-MCNC: 250 MG/DL (ref 65–100)
GLUCOSE BLD STRIP.AUTO-MCNC: 254 MG/DL (ref 65–100)
GLUCOSE BLD STRIP.AUTO-MCNC: 308 MG/DL (ref 65–100)
GLUCOSE BLD STRIP.AUTO-MCNC: 384 MG/DL (ref 65–100)
GLUCOSE SERPL-MCNC: 293 MG/DL (ref 65–100)
HCT VFR BLD AUTO: 36.8 % (ref 35–47)
HGB BLD-MCNC: 12.1 G/DL (ref 11.5–16)
MAGNESIUM SERPL-MCNC: 2.3 MG/DL (ref 1.6–2.4)
MCH RBC QN AUTO: 26.8 PG (ref 26–34)
MCHC RBC AUTO-ENTMCNC: 32.9 G/DL (ref 30–36.5)
MCV RBC AUTO: 81.4 FL (ref 80–99)
NRBC # BLD: 0 K/UL (ref 0–0.01)
NRBC BLD-RTO: 0 PER 100 WBC
PHOSPHATE SERPL-MCNC: 2.5 MG/DL (ref 2.6–4.7)
PLATELET # BLD AUTO: 212 K/UL (ref 150–400)
PMV BLD AUTO: 10.2 FL (ref 8.9–12.9)
POTASSIUM SERPL-SCNC: 4.1 MMOL/L (ref 3.5–5.1)
RBC # BLD AUTO: 4.52 M/UL (ref 3.8–5.2)
SERVICE CMNT-IMP: ABNORMAL
SODIUM SERPL-SCNC: 136 MMOL/L (ref 136–145)
WBC # BLD AUTO: 6 K/UL (ref 3.6–11)

## 2019-02-23 PROCEDURE — 74011000250 HC RX REV CODE- 250: Performed by: INTERNAL MEDICINE

## 2019-02-23 PROCEDURE — 85027 COMPLETE CBC AUTOMATED: CPT

## 2019-02-23 PROCEDURE — 74011250637 HC RX REV CODE- 250/637: Performed by: INTERNAL MEDICINE

## 2019-02-23 PROCEDURE — 82962 GLUCOSE BLOOD TEST: CPT

## 2019-02-23 PROCEDURE — 83735 ASSAY OF MAGNESIUM: CPT

## 2019-02-23 PROCEDURE — 97165 OT EVAL LOW COMPLEX 30 MIN: CPT

## 2019-02-23 PROCEDURE — 94760 N-INVAS EAR/PLS OXIMETRY 1: CPT

## 2019-02-23 PROCEDURE — 99218 HC RM OBSERVATION: CPT

## 2019-02-23 PROCEDURE — 97535 SELF CARE MNGMENT TRAINING: CPT

## 2019-02-23 PROCEDURE — 74011636637 HC RX REV CODE- 636/637: Performed by: INTERNAL MEDICINE

## 2019-02-23 PROCEDURE — 97116 GAIT TRAINING THERAPY: CPT

## 2019-02-23 PROCEDURE — 84100 ASSAY OF PHOSPHORUS: CPT

## 2019-02-23 PROCEDURE — 80048 BASIC METABOLIC PNL TOTAL CA: CPT

## 2019-02-23 PROCEDURE — 36415 COLL VENOUS BLD VENIPUNCTURE: CPT

## 2019-02-23 PROCEDURE — 97530 THERAPEUTIC ACTIVITIES: CPT

## 2019-02-23 PROCEDURE — 74011250636 HC RX REV CODE- 250/636: Performed by: PHYSICIAN ASSISTANT

## 2019-02-23 PROCEDURE — 74011250636 HC RX REV CODE- 250/636: Performed by: INTERNAL MEDICINE

## 2019-02-23 PROCEDURE — 96372 THER/PROPH/DIAG INJ SC/IM: CPT

## 2019-02-23 RX ORDER — METHYLPREDNISOLONE 4 MG/1
TABLET ORAL
Status: DISCONTINUED | OUTPATIENT
Start: 2019-02-23 | End: 2019-02-23

## 2019-02-23 RX ORDER — METHYLPREDNISOLONE 4 MG/1
8 TABLET ORAL ONCE
Status: COMPLETED | OUTPATIENT
Start: 2019-02-24 | End: 2019-02-24

## 2019-02-23 RX ORDER — METHYLPREDNISOLONE 4 MG/1
4 TABLET ORAL
Status: DISCONTINUED | OUTPATIENT
Start: 2019-02-28 | End: 2019-02-26 | Stop reason: HOSPADM

## 2019-02-23 RX ORDER — METHYLPREDNISOLONE 4 MG/1
4 TABLET ORAL 2 TIMES DAILY
Status: DISCONTINUED | OUTPATIENT
Start: 2019-02-27 | End: 2019-02-26 | Stop reason: HOSPADM

## 2019-02-23 RX ORDER — METHYLPREDNISOLONE 4 MG/1
8 TABLET ORAL ONCE
Status: COMPLETED | OUTPATIENT
Start: 2019-02-23 | End: 2019-02-24

## 2019-02-23 RX ORDER — METHYLPREDNISOLONE 4 MG/1
4 TABLET ORAL
Status: COMPLETED | OUTPATIENT
Start: 2019-02-25 | End: 2019-02-25

## 2019-02-23 RX ORDER — ATORVASTATIN CALCIUM 20 MG/1
20 TABLET, FILM COATED ORAL DAILY
Status: DISCONTINUED | OUTPATIENT
Start: 2019-02-23 | End: 2019-02-24

## 2019-02-23 RX ORDER — METHYLPREDNISOLONE 4 MG/1
8 TABLET ORAL ONCE
Status: COMPLETED | OUTPATIENT
Start: 2019-02-23 | End: 2019-02-23

## 2019-02-23 RX ORDER — METHYLPREDNISOLONE 4 MG/1
4 TABLET ORAL 3 TIMES DAILY
Status: DISCONTINUED | OUTPATIENT
Start: 2019-02-26 | End: 2019-02-26 | Stop reason: HOSPADM

## 2019-02-23 RX ORDER — AMLODIPINE BESYLATE 5 MG/1
10 TABLET ORAL DAILY
Status: DISCONTINUED | OUTPATIENT
Start: 2019-02-23 | End: 2019-02-24

## 2019-02-23 RX ORDER — METHYLPREDNISOLONE 4 MG/1
4 TABLET ORAL
Status: COMPLETED | OUTPATIENT
Start: 2019-02-24 | End: 2019-02-24

## 2019-02-23 RX ORDER — METHYLPREDNISOLONE 4 MG/1
4 TABLET ORAL ONCE
Status: COMPLETED | OUTPATIENT
Start: 2019-02-23 | End: 2019-02-23

## 2019-02-23 RX ADMIN — GLIPIZIDE 5 MG: 5 TABLET ORAL at 11:17

## 2019-02-23 RX ADMIN — METHYLPREDNISOLONE 4 MG: 4 TABLET ORAL at 13:48

## 2019-02-23 RX ADMIN — AMLODIPINE BESYLATE 10 MG: 5 TABLET ORAL at 11:16

## 2019-02-23 RX ADMIN — INSULIN GLARGINE 42 UNITS: 100 INJECTION, SOLUTION SUBCUTANEOUS at 21:46

## 2019-02-23 RX ADMIN — DOCUSATE SODIUM 100 MG: 100 CAPSULE, LIQUID FILLED ORAL at 17:08

## 2019-02-23 RX ADMIN — LOSARTAN POTASSIUM 100 MG: 50 TABLET, FILM COATED ORAL at 09:28

## 2019-02-23 RX ADMIN — INSULIN LISPRO 7 UNITS: 100 INJECTION, SOLUTION INTRAVENOUS; SUBCUTANEOUS at 09:29

## 2019-02-23 RX ADMIN — Medication 10 ML: at 06:59

## 2019-02-23 RX ADMIN — METHYLPREDNISOLONE 4 MG: 4 TABLET ORAL at 21:44

## 2019-02-23 RX ADMIN — ATORVASTATIN CALCIUM 20 MG: 20 TABLET, FILM COATED ORAL at 11:16

## 2019-02-23 RX ADMIN — INSULIN LISPRO 10 UNITS: 100 INJECTION, SOLUTION INTRAVENOUS; SUBCUTANEOUS at 11:16

## 2019-02-23 RX ADMIN — GLIPIZIDE 5 MG: 5 TABLET ORAL at 17:08

## 2019-02-23 RX ADMIN — HYDROCHLOROTHIAZIDE 12.5 MG: 25 TABLET ORAL at 09:28

## 2019-02-23 RX ADMIN — KETOROLAC TROMETHAMINE 15 MG: 30 INJECTION, SOLUTION INTRAMUSCULAR; INTRAVENOUS at 01:02

## 2019-02-23 RX ADMIN — METHYLPREDNISOLONE 8 MG: 4 TABLET ORAL at 13:47

## 2019-02-23 RX ADMIN — INSULIN GLARGINE 42 UNITS: 100 INJECTION, SOLUTION SUBCUTANEOUS at 21:47

## 2019-02-23 RX ADMIN — ENOXAPARIN SODIUM 40 MG: 40 INJECTION SUBCUTANEOUS at 09:28

## 2019-02-23 RX ADMIN — INSULIN LISPRO 7 UNITS: 100 INJECTION, SOLUTION INTRAVENOUS; SUBCUTANEOUS at 17:09

## 2019-02-23 RX ADMIN — INSULIN LISPRO 5 UNITS: 100 INJECTION, SOLUTION INTRAVENOUS; SUBCUTANEOUS at 21:46

## 2019-02-23 RX ADMIN — DOCUSATE SODIUM 100 MG: 100 CAPSULE, LIQUID FILLED ORAL at 09:28

## 2019-02-23 RX ADMIN — DEXAMETHASONE 4 MG: 4 TABLET ORAL at 06:58

## 2019-02-23 RX ADMIN — Medication 5 ML: at 13:46

## 2019-02-23 RX ADMIN — ACETAMINOPHEN 650 MG: 325 TABLET ORAL at 13:46

## 2019-02-23 NOTE — PROGRESS NOTES
ORTHOPEDIC CONSULTATION     SUBJECTIVE:     Emily Campos is a 68 y.o. female  evaluated for new onset of back pain. The patient localizes their pain to L-spine. Intensity is noted to be moderate and improving. The symptoms began yesterday. Other concerns include none. Past Medical History :   No past medical history on file. Past Surgical History :   No past surgical history on file. Medications :  See med reconciliation    Allergies :   Patient has no known allergies. Review of Systems:  (bold if positive, if negative)    Gen:  Eyes:  ENT:  CVS:  Pulm:  GI:    :    MS:  Skin:  Psych:  Endo:    Hem:  Renal:    Neuro:         FAMILY HISTORY :  No family history on file. Objective  Objective:     Vitals:  Patient Vitals for the past 12 hrs:   BP Temp Pulse Resp SpO2   02/23/19 0749 151/72 98 °F (36.7 °C) 72 16 97 %   02/23/19 0323 164/71 97.8 °F (36.6 °C) 70 16 94 %   02/22/19 2300 148/69 98 °F (36.7 °C) 62 18 96 %       Alert and Oriented x 3, Dementia is not present. No Acute Respiratory Distress  Heart and Lung exam normal    Focused Physical Exam :   TTP over the L-spine. MS intact with no long tract signs noted. No Lymphadenopathy   Palpable pulses  No skin trophic changes    Labs :   Recent Labs     02/23/19  0228   HGB 12.1   HCT 36.8      K 4.1      CO2 25   BUN 27*   CREA 1.11*   *       X-rays :   MRI : Minimal DDD of the L-spine     ASSESSMENT :   Resolving lower back pain - Likely myofacial pain. Recommendations : Mobilize with therapy. She would benefit from IV pain control and f/u with Dr Emilee Drew if not improving. .     Thank you for allowing to participate in this patients care. Please do not hesitate to contact my office or page me with any questions or concerns.               Juanita Wang MD  Cell [phone]  Teresa Nelson PA-C  Cell [phone]  Medical Staff : Elsa Small  Office : [phone]

## 2019-02-23 NOTE — PROGRESS NOTES
Michi Gamboa [facility] [address]  [address], [place], [address] [facility]  [phone]       Medical Progress Note      NAME: Joel Watson   :  1945  MRM:  [id number]    Date/Time: 2019  11:00 AM       Assessment and Plan:   1. Back pain / Debility. POA. Non-traumatic. MRI of L spine showed mild degenerative disease. Pain control. PT/OT evaluation. S/p IV steroid.     2. Hypertension. BP okay. Continue amlodipine, HCTZ and losartan.      3. CAD / Hx of CABG. Stable. No CP. 4.  DM. Continue home lantus, glipizide and cover with SSI.      5.  Gout. No active gout at present. Subjective:     Chief Complaint:  Follow up of pt who was admitted with back pain. Back pain is better, but still has some. ROS:  (bold if positive, if negative)      Tolerating PT  Tolerating Diet        Objective:     Last 24hrs VS reviewed since prior progress note.  Most recent are:    Visit Vitals  /74 (BP 1 Location: Right arm, BP Patient Position: Sitting)   Pulse 60   Temp 97.9 °F (36.6 °C)   Resp 18   Ht 5' 3\" (1.6 m)   Wt 86.2 kg (190 lb)   SpO2 99%   BMI 33.66 kg/m²     SpO2 Readings from Last 6 Encounters:   19 99%   19 97%            Intake/Output Summary (Last 24 hours) at 2019 1100  Last data filed at 2019 1915  Gross per 24 hour   Intake 600 ml   Output --   Net 600 ml        Physical Exam:    Gen:  Well-developed, well-nourished, in no acute distress  HEENT:  Pink conjunctivae, PERRL, hearing intact to voice, moist mucous membranes  Neck:  Supple, without masses, thyroid non-tender  Resp:  No accessory muscle use, clear breath sounds without wheezes rales or rhonchi  Card:  No murmurs, normal S1, S2 without thrills, bruits or peripheral edema  Abd:  Soft, non-tender, non-distended, normoactive bowel sounds are present, no palpable organomegaly and no detectable hernias  Lymph:  No cervical or inguinal adenopathy  Musc:  No cyanosis or clubbing  Skin:  No rashes or ulcers, skin turgor is good  Neuro:  Cranial nerves are grossly intact, no focal motor weakness, follows commands appropriately  Psych:  Good insight, oriented to person, place and time, alert  __________________________________________________________________  Medications Reviewed: (see below)  Medications:     Current Facility-Administered Medications   Medication Dose Route Frequency    atorvastatin (LIPITOR) tablet 20 mg  20 mg Oral DAILY    amLODIPine (NORVASC) tablet 10 mg  10 mg Oral DAILY    sodium chloride (NS) flush 5-40 mL  5-40 mL IntraVENous Q8H    sodium chloride (NS) flush 5-40 mL  5-40 mL IntraVENous PRN    naloxone (NARCAN) injection 0.4 mg  0.4 mg IntraVENous PRN    0.9% sodium chloride infusion  50 mL/hr IntraVENous CONTINUOUS    acetaminophen (TYLENOL) tablet 650 mg  650 mg Oral Q4H PRN    HYDROcodone-acetaminophen (NORCO) 5-325 mg per tablet 1 Tab  1 Tab Oral Q4H PRN    ondansetron (ZOFRAN) injection 4 mg  4 mg IntraVENous Q4H PRN    docusate sodium (COLACE) capsule 100 mg  100 mg Oral BID    enoxaparin (LOVENOX) injection 40 mg  40 mg SubCUTAneous Q24H    dextrose (D50W) injection syrg 12.5-25 g  25-50 mL IntraVENous PRN    tiZANidine (ZANAFLEX) tablet 4 mg  4 mg Oral Q6H PRN    lidocaine 4 % patch 1 Patch  1 Patch TransDERmal Q24H    insulin glargine (LANTUS) injection 42 Units  42 Units SubCUTAneous QHS    And    insulin glargine (LANTUS) injection 42 Units  42 Units SubCUTAneous QHS    insulin lispro (HUMALOG) injection   SubCUTAneous AC&HS    glucose chewable tablet 16 g  4 Tab Oral PRN    dextrose (D50W) injection syrg 12.5-25 g  25-50 mL IntraVENous PRN    glucagon (GLUCAGEN) injection 1 mg  1 mg IntraMUSCular PRN    ketorolac (TORADOL) injection 15 mg  15 mg IntraVENous Q6H PRN    HYDROmorphone (PF) (DILAUDID) injection 1 mg  1 mg IntraVENous Q4H PRN    glipiZIDE (GLUCOTROL) tablet 5 mg  5 mg Oral BID WITH MEALS    colchicine (MITIGARE) capsule 0.6 mg 0.6 mg Oral DAILY PRN    losartan (COZAAR) tablet 100 mg  100 mg Oral DAILY    And    hydroCHLOROthiazide (HYDRODIURIL) tablet 12.5 mg  12.5 mg Oral DAILY        Lab Data Reviewed: (see below)  Lab Review:     Recent Labs     02/23/19 0228 02/22/19 0352   WBC 6.0 6.5   HGB 12.1 12.0   HCT 36.8 36.8    206     Recent Labs     02/23/19 0228 02/22/19 0352    140   K 4.1 3.7    103   CO2 25 27   * 142*   BUN 27* 23*   CREA 1.11* 1.12*   CA 9.3 9.4   MG 2.3  --    PHOS 2.5*  --    ALB  --  3.5   TBILI  --  0.3   SGOT  --  20   ALT  --  25     Lab Results   Component Value Date/Time    Glucose (POC) 308 (H) 02/23/2019 10:59 AM    Glucose (POC) 250 (H) 02/23/2019 07:53 AM    Glucose (POC) 433 (H) 02/22/2019 08:49 PM    Glucose (POC) 235 (H) 02/22/2019 11:38 AM    Glucose (POC) 120 (H) 02/22/2019 05:40 AM     No results for input(s): PH, PCO2, PO2, HCO3, FIO2 in the last 72 hours. No results for input(s): INR in the last 72 hours. No lab exists for component: INREXT  All Micro Results     Procedure Component Value Units Date/Time    URINE CULTURE HOLD SAMPLE [[id number]] Collected:  02/22/19 5573    Order Status:  Completed Specimen:  Urine from Serum Updated:  02/22/19 6747     Urine culture hold       URINE ON HOLD IN MICROBIOLOGY DEPT FOR 3 DAYS. IF UNPRESERVED URINE IS SUBMITTED, IT CANNOT BE USED FOR ADDITIONAL TESTING AFTER 24 HRS, RECOLLECTION WILL BE REQUIRED. I have reviewed notes of prior 24hr. Other pertinent lab:       Total time spent with patient: [address] discussed with: Patient, Nursing Staff and >50% of time spent in counseling and coordination of care    Discussed:  Care Plan    Prophylaxis:  Lovenox    Disposition:  Home w/Family           ___________________________________________________    Attending Physician: Brendon Miller MD

## 2019-02-23 NOTE — PROGRESS NOTES
Spiritual Care Partner Volunteer visited patient in [address] [place] [address] on 2/23/19.   Documented by: Burgess Najjar., MS., [address] [facility] [place] ([id number])

## 2019-02-23 NOTE — PROGRESS NOTES
Orders received, chart reviewed and patient evaluated by occupational therapy. Patient currently very fearful about returning home where she lives alone. She states she does not feel like she can walk safely around her house and get up and down for ADLs throughout the day. Will need RW if discharged home and possibly BSC as well. Discharge disposition pending MD rec. Recommend with nursing patient to complete as able in order to maintain strength, endurance and independence: OOB to chair 3x/day, ADLs with supervision/setup and mobilizing to the bathroom for toileting with SBA/CGA assist. Thank you for your assistance. Full evaluation to follow.        Thank you,  Susan Hoyt, MEENAR/L

## 2019-02-23 NOTE — PROGRESS NOTES
Problem: Mobility Impaired (Adult and Pediatric)  Goal: *Acute Goals and Plan of Care (Insert Text)  Physical Therapy Goals  Initiated 2/22/2019  1. Patient will move from supine to sit and sit to supine  in bed with modified independence within 7 day(s). 2.  Patient will transfer from bed to chair and chair to bed with modified independence using the least restrictive device within 7 day(s). 3.  Patient will perform sit to stand with modified independence within 7 day(s). 4.  Patient will ambulate with modified independence for 250 feet with the least restrictive device within 7 day(s). 5.  Patient will ascend/descend 4 stairs with 1 handrail(s) with minimal assistance/contact guard assist within 7 day(s). physical Therapy TREATMENT  Patient: Rios Pan (34 y.o. female)  Date: 2/23/2019  Diagnosis: Back pain [M54.9] Back pain      Precautions: Fall  Chart, physical therapy assessment, plan of care and goals were reviewed. ASSESSMENT:  Pt received in chair, agreeable to participate with physical therapy. CGA/Min A for functional transfers and gait training using RW for steadying verbal cues for safe technique. Ascend/ descend 4 step using single handrail on R, with Min A and constant  Verbal cues for sequencing. Pt lives alone, has 12 steps to bedroom level  and would not be safe to return home without assistance, she was independent PTA and may benefit from short inpt rehab stay to improve activity tolerance. Progression toward goals:  []    Improving appropriately and progressing toward goals  []    Improving slowly and progressing toward goals  []    Not making progress toward goals and plan of care will be adjusted     PLAN:  Patient continues to benefit from skilled intervention to address the above impairments. Continue treatment per established plan of care.   Discharge Recommendations:  Inpatient Rehab  Further Equipment Recommendations for Discharge:  none     SUBJECTIVE:   Patient stated what am I going to do.     OBJECTIVE DATA SUMMARY:   Critical Behavior:     Orientation Level: Oriented X4     Safety/Judgement: Awareness of environment, Insight into deficits  Functional Mobility Training:  Bed Mobility:                    Transfers:  Sit to Stand: Contact guard assistance  Stand to Sit: Contact guard assistance        Bed to Chair: Contact guard assistance                    Balance:  Sitting: Intact  Standing: Impaired; Without support  Standing - Static: Good;Constant support  Standing - Dynamic : Fair  Ambulation/Gait Training:  Distance (ft): 50 Feet (ft)  Assistive Device: Walker, rolling;Gait belt  Ambulation - Level of Assistance: Minimal assistance        Gait Abnormalities: Decreased step clearance        Base of Support: Widened     Speed/Siria: Slow                       Stairs:  Number of Stairs Trained: 4  Stairs - Level of Assistance: Minimum assistance   Rail Use: Right     Neuro Re-Education:    Therapeutic Exercises:     Pain:                    Activity Tolerance:   good  Please refer to the flowsheet for vital signs taken during this treatment.   After treatment:   [x]    Patient left in no apparent distress sitting up in chair  []    Patient left in no apparent distress in bed  [x]    Call bell left within reach  [x]    Nursing notified  []    Caregiver present  [x]    Bed alarm activated    COMMUNICATION/COLLABORATION:   The patients plan of care was discussed with: Registered Nurse    Emy Major   Time Calculation: 30 mins

## 2019-02-23 NOTE — PROGRESS NOTES
Ortho Progress Note        S:  Pt. Sitting up in bed. Pain controlled    O:  I examined pt's L spine. NO Spinous process pain. No paraspinous process pain on exam. Strength 5/5 BLE's, DTR 2+ BLE's, +Dorsi/plantar flexion BLE's. NVI distally BLE's. Cap. Refill <2secs all toes. A:  Lumbago    P:  MRI L spine without stenosis. Likely musculoskelatal injury with swelling on peripheral nerves. Continue PT, may need SNF vs Rehab if unsafe to go home. Medrol dose pack for next 6 days. Please continue on discharge. Continue Zanaflex for muscle spasms as well. No neuro deficits, no spine surgery implications at this time. Please reconsult as needed. Dr. Niall Lewis agrees with plan. Thank you for allowing us to take part in this patients care.     PRETTY SingletonC  Orthopaedic Surgery PA

## 2019-02-23 NOTE — PROGRESS NOTES
edside shift change report given to Julianne Ashley RN (oncoming nurse) by Nikita Herrera RN (offgoing nurse).  Report included the following information SBAR, Kardex, [facility] and Recent Results

## 2019-02-23 NOTE — PROGRESS NOTES
Problem: Self Care Deficits Care Plan (Adult)  Goal: *Acute Goals and Plan of Care (Insert Text)  Occupational Therapy Goals  Initiated 2/23/2019  1. Patient will perform grooming tasks in standing with modified independence within 7 day(s). 2.  Patient will perform bathing with modified independence within 7 day(s). 3.  Patient will perform lower body dressing with modified independence (using AE prn) within 7 day(s). 4.  Patient will perform toilet transfers with modified independence within 7 day(s). 5.  Patient will perform all aspects of toileting with modified independence within 7 day(s). Occupational Therapy EVALUATION  Patient: Neal Conde (89 y.o. female)  Date: 2/23/2019  Primary Diagnosis: Back pain [M54.9]       Precautions: Fall    ASSESSMENT :    Chart reviewed and patient cleared by RN for therapy. Patient very cooperative. She states that a few days ago her back pain was so severe at home she could not move and was brought to ED. CT of lumbar spine showed multilevel diffuse disc bulge, multilevel mild stenosis and L4-5 moderate R foraminal stenosis. She is not a candidate for surgery at this time and is now receiving IV steroids for pain management. Based on the objective data described below, the patient presents with decreased independence in ADLs and functional transfers. Today she completed functional transfers using RW with CGA, toileting Min A, and LB dressing/bathing Mod A. Patient limited by back pain, decreased balance, decreased fine motor coordination/strength related to CTS and fear of falling. She will benefit from training with AE. She will also need a BSC and RW if discharged home. Patient does not feel like she can discharge home safely at this time. She says she lives alone, and does not feel that she can safely walk around her room and complete her basic ADLS (dressing/toileting, simple meal prep).   Discharge disposition pending [facility], but she may benefit from short term rehab stay, or at least home health OT if discharged home. Patient will benefit from skilled intervention to address the above impairments. Patients rehabilitation potential is considered to be Good  Factors which may influence rehabilitation potential include:   []             None noted  []             Mental ability/status  []             Medical condition  []             Home/family situation and support systems  []             Safety awareness  []             Pain tolerance/management  []             Other:      PLAN :  Recommendations and Planned Interventions:  []               Self Care Training                  []        Therapeutic Activities  []               Functional Mobility Training    []        Cognitive Retraining  []               Therapeutic Exercises           []        Endurance Activities  []               Balance Training                   []        Neuromuscular Re-Education  []               Visual/Perceptual Training     []   Home Safety Training  []               Patient Education                 []        Family Training/Education  []               Other (comment):    Frequency/Duration: Patient will be followed by occupational therapy 5 times a week to address goals. Discharge Recommendations: Home Health, rehab  Further Equipment Recommendations for Discharge: bedside commode, shower chair and rolling walker, LB dressing AE     SUBJECTIVE:   Patient stated I want to do things for myself.     OBJECTIVE DATA SUMMARY:   HISTORY:   No past medical history on file. No past surgical history on file.     Prior Level of Function/Environment/Context: patient lives alone in 2 level home, was independent  PTA     Expanded or extensive additional review of patient history:     Home Situation  Home Environment: Private residence  One/Two Story Residence: Two story  Living Alone: Yes  Support Systems: Family member(s)  Patient Expects to be Discharged to[de-identified] Private residence  Current DME Used/Available at Home: None  Tub or Shower Type: Tub/Shower combination    Hand dominance: Right    EXAMINATION OF PERFORMANCE DEFICITS:  Cognitive/Behavioral Status:     Orientation Level: Oriented X4           Safety/Judgement: Awareness of environment; Insight into deficits    Skin: intact    Edema: none in the uppers    Hearing: Auditory  Auditory Impairment: None    Vision/Perceptual:                         No recent changes        Corrective Lenses: Glasses    Range of Motion:  WFL                            Strength:  WFL. However, patient reports dropping item and that her hands feel weak which she thinks is due to bilateral CTS. She has wrist splints but did not bring them to hospital.                    Coordination:     Fine Motor Skills-Upper: Right Impaired;Left Impaired  (most likely related to CTS)  Gross Motor Skills-Upper: Left Intact; Right Intact    Tone & Sensation:  Patient reports b/l tingling in dorsum and volar hands/wrist area. All else WNL           Balance: impaired, benefits from RW for standing balance, moves slowly       Functional Mobility and Transfers for ADLs:  Bed Mobility:   NT. Patient received in chair. Transfers:  Sit to Stand: Contact guard assistance  Stand to Sit: Contact guard assistance  Bed to Chair: Contact guard assistance  Bathroom Mobility: Contact guard assistance  Toilet Transfer : Contact guard assistance    ADL Assessment:  Feeding: Independent    Oral Facial Hygiene/Grooming: Setup    Bathing: Moderate assistance(A with lower body)    Upper Body Dressing: Setup    Lower Body Dressing: Moderate assistance(A to don clothing/shoes on feet )    Toileting: Minimum assistance(A for steadying during dynamic standing)                ADL Intervention and task modifications:             OT eval completed. Provided education on RW management/ use, DME/AE needs and ADLS as related to back pain.             Cognitive Retraining  Safety/Judgement: Awareness of environment; Insight into deficits      Functional Measure:  Barthel Index:    Bathin  Bladder: 10  Bowels: 10  Groomin  Dressin  Feeding: 10  Mobility: 0  Stairs: 0  Toilet Use: 5  Transfer (Bed to Chair and Back): 10  Total: 55        Indicating 45% impairment in basic ADL ability. The Barthel ADL Index: Guidelines  1. The index should be used as a record of what a patient does, not as a record of what a patient could do. 2. The main aim is to establish degree of independence from any help, physical or verbal, however minor and for whatever reason. 3. The need for supervision renders the patient not independent. 4. A patient's performance should be established using the best available evidence. Asking the patient, friends/relatives and nurses are the usual sources, but direct observation and common sense are also important. However direct testing is not needed. 5. Usually the patient's performance over the preceding 24-48 hours is important, but occasionally longer periods will be relevant. 6. Middle categories imply that the patient supplies over 50 per cent of the effort. 7. Use of aids to be independent is allowed. Esperanza Cordova., Barthel, D.W. (2590). Functional evaluation: the Barthel Index. [address] [facility] (14)2. YADI Salcido, Desire Herrera., Amy Carney., [place], [address] [place] [address] (). Measuring the change indisability after inpatient rehabilitation; comparison of the responsiveness of the Barthel Index and Functional [place] Measure. Journal of Neurology, Neurosurgery, and Psychiatry, 66(4), 524-040. Jair Jj, N.J.A, PREETI Haider, & Judit Locke M.A. (2004.) Assessment of post-stroke quality of life in cost-effectiveness studies: The usefulness of the Barthel Index and the EuroQoL-5D.  Quality of Life Research, 15, 591-14       Occupational Therapy Evaluation Charge Determination   History Examination Decision-Making   LOW Complexity : Brief history review  LOW Complexity : 1-3 performance deficits relating to physical, cognitive , or psychosocial skils that result in activity limitations and / or participation restrictions  LOW Complexity : No comorbidities that affect functional and no verbal or physical assistance needed to complete eval tasks       Based on the above components, the patient evaluation is determined to be of the following complexity level: LOW   Pain:            no complaint during eval        Activity Tolerance:   Good     Please refer to the flowsheet for vital signs taken during this treatment. After treatment:   [x] Patient left in no apparent distress sitting up in chair  [] Patient left in no apparent distress in bed  [x] Call bell left within reach  [] Nursing notified  [] Caregiver present  [x] chair   alarm activated    COMMUNICATION/EDUCATION:   The patients plan of care was discussed with: Registered Nurse. [x] Home safety education was provided and the patient/caregiver indicated understanding. [x] Patient/family have participated as able in goal setting and plan of care. [x] Patient/family agree to work toward stated goals and plan of care. [] Patient understands intent and goals of therapy, but is neutral about his/her participation. [] Patient is unable to participate in goal setting and plan of care. This patients plan of care is appropriate for delegation to [facility].     Thank you for this referral.  Torres Macdonald, OT  Time Calculation: 32 mins

## 2019-02-23 NOTE — PROGRESS NOTES
3:13 PM  CM consult noted for inpatient rehab. Spoke to pt who is in agreement with a referral to [facility]. CM called [facility] and left and message and sent referral in [employer]. Will continue to follow.   Ela Bruner

## 2019-02-23 NOTE — PROGRESS NOTES
Shift Summary     Bedside and Verbal shift change report given to [address] (oncoming nurse) by Florinda Lewis RN (offgoing nurse). Report included the following information SBAR, Kardex, MAR and Recent Results. TRANSFER - IN REPORT:    Verbal report received from Lovely/Dory CRAMER RN(name) on [place]  being received from ER(unit) for routine progression of care      Report consisted of patients Situation, Background, Assessment and   Recommendations(SBAR). Information from the following report(s) SBAR, Kardex, [facility] and Recent Results was reviewed with the receiving nurse. Opportunity for questions and clarification was provided. Assessment completed upon patients arrival to unit and care assumed. MRI checklist completed by [facility]. MRI notified. [id number]  Bedside and Verbal shift change report given to Roma LIAO (oncoming nurse) by Karen Hinkle RN (offgoing nurse). Report included the following information SBAR, Kardex, MAR and Recent Results.

## 2019-02-24 LAB
GLUCOSE BLD STRIP.AUTO-MCNC: 184 MG/DL (ref 65–100)
GLUCOSE BLD STRIP.AUTO-MCNC: 228 MG/DL (ref 65–100)
GLUCOSE BLD STRIP.AUTO-MCNC: 242 MG/DL (ref 65–100)
GLUCOSE BLD STRIP.AUTO-MCNC: 263 MG/DL (ref 65–100)
SERVICE CMNT-IMP: ABNORMAL

## 2019-02-24 PROCEDURE — 74011250636 HC RX REV CODE- 250/636: Performed by: PHYSICIAN ASSISTANT

## 2019-02-24 PROCEDURE — 96372 THER/PROPH/DIAG INJ SC/IM: CPT

## 2019-02-24 PROCEDURE — 74011250636 HC RX REV CODE- 250/636: Performed by: INTERNAL MEDICINE

## 2019-02-24 PROCEDURE — 74011000250 HC RX REV CODE- 250: Performed by: INTERNAL MEDICINE

## 2019-02-24 PROCEDURE — 74011250637 HC RX REV CODE- 250/637: Performed by: INTERNAL MEDICINE

## 2019-02-24 PROCEDURE — 82962 GLUCOSE BLOOD TEST: CPT

## 2019-02-24 PROCEDURE — 96375 TX/PRO/DX INJ NEW DRUG ADDON: CPT

## 2019-02-24 PROCEDURE — 99218 HC RM OBSERVATION: CPT

## 2019-02-24 PROCEDURE — 74011636637 HC RX REV CODE- 636/637: Performed by: INTERNAL MEDICINE

## 2019-02-24 PROCEDURE — 94760 N-INVAS EAR/PLS OXIMETRY 1: CPT

## 2019-02-24 RX ORDER — CYCLOBENZAPRINE HCL 10 MG
10 TABLET ORAL
Status: DISCONTINUED | OUTPATIENT
Start: 2019-02-24 | End: 2019-02-26 | Stop reason: HOSPADM

## 2019-02-24 RX ORDER — ATORVASTATIN CALCIUM 20 MG/1
20 TABLET, FILM COATED ORAL DAILY
Status: DISCONTINUED | OUTPATIENT
Start: 2019-02-24 | End: 2019-02-26 | Stop reason: HOSPADM

## 2019-02-24 RX ORDER — AMLODIPINE BESYLATE 5 MG/1
10 TABLET ORAL DAILY
Status: DISCONTINUED | OUTPATIENT
Start: 2019-02-24 | End: 2019-02-26 | Stop reason: HOSPADM

## 2019-02-24 RX ADMIN — METHYLPREDNISOLONE 8 MG: 4 TABLET ORAL at 01:29

## 2019-02-24 RX ADMIN — ATORVASTATIN CALCIUM 20 MG: 20 TABLET, FILM COATED ORAL at 09:50

## 2019-02-24 RX ADMIN — GLIPIZIDE 5 MG: 5 TABLET ORAL at 17:39

## 2019-02-24 RX ADMIN — METHYLPREDNISOLONE 4 MG: 4 TABLET ORAL at 13:18

## 2019-02-24 RX ADMIN — METHYLPREDNISOLONE 8 MG: 4 TABLET ORAL at 21:56

## 2019-02-24 RX ADMIN — Medication 10 ML: at 07:03

## 2019-02-24 RX ADMIN — Medication 5 ML: at 14:26

## 2019-02-24 RX ADMIN — CYCLOBENZAPRINE HYDROCHLORIDE 10 MG: 10 TABLET, FILM COATED ORAL at 20:56

## 2019-02-24 RX ADMIN — DOCUSATE SODIUM 100 MG: 100 CAPSULE, LIQUID FILLED ORAL at 08:47

## 2019-02-24 RX ADMIN — HYDROMORPHONE HYDROCHLORIDE 1 MG: 2 INJECTION INTRAMUSCULAR; INTRAVENOUS; SUBCUTANEOUS at 01:50

## 2019-02-24 RX ADMIN — INSULIN LISPRO 2 UNITS: 100 INJECTION, SOLUTION INTRAVENOUS; SUBCUTANEOUS at 21:56

## 2019-02-24 RX ADMIN — INSULIN LISPRO 4 UNITS: 100 INJECTION, SOLUTION INTRAVENOUS; SUBCUTANEOUS at 13:17

## 2019-02-24 RX ADMIN — METHYLPREDNISOLONE 4 MG: 4 TABLET ORAL at 07:02

## 2019-02-24 RX ADMIN — ENOXAPARIN SODIUM 40 MG: 40 INJECTION SUBCUTANEOUS at 08:47

## 2019-02-24 RX ADMIN — HYDROCHLOROTHIAZIDE 12.5 MG: 25 TABLET ORAL at 08:47

## 2019-02-24 RX ADMIN — INSULIN GLARGINE 42 UNITS: 100 INJECTION, SOLUTION SUBCUTANEOUS at 20:32

## 2019-02-24 RX ADMIN — Medication 10 ML: at 01:33

## 2019-02-24 RX ADMIN — INSULIN LISPRO 7 UNITS: 100 INJECTION, SOLUTION INTRAVENOUS; SUBCUTANEOUS at 17:39

## 2019-02-24 RX ADMIN — Medication 10 ML: at 21:59

## 2019-02-24 RX ADMIN — AMLODIPINE BESYLATE 10 MG: 5 TABLET ORAL at 09:51

## 2019-02-24 RX ADMIN — INSULIN LISPRO 3 UNITS: 100 INJECTION, SOLUTION INTRAVENOUS; SUBCUTANEOUS at 08:47

## 2019-02-24 RX ADMIN — DOCUSATE SODIUM 100 MG: 100 CAPSULE, LIQUID FILLED ORAL at 17:39

## 2019-02-24 RX ADMIN — INSULIN GLARGINE 42 UNITS: 100 INJECTION, SOLUTION SUBCUTANEOUS at 20:33

## 2019-02-24 RX ADMIN — LOSARTAN POTASSIUM 100 MG: 50 TABLET, FILM COATED ORAL at 08:47

## 2019-02-24 RX ADMIN — GLIPIZIDE 5 MG: 5 TABLET ORAL at 10:14

## 2019-02-24 RX ADMIN — METHYLPREDNISOLONE 4 MG: 4 TABLET ORAL at 17:39

## 2019-02-24 NOTE — PROGRESS NOTES
2:22PM  AMBAR explained to pt she was not accepted at [facility]. Discussed other options. After discussion with her MD, she chose [facility] and [facility] to send referrals. Called [facility] and sent referral and sent referral to [address] [place] [address]. Patrice Saldivar    10:39 am  CM received notification from [facility] pt does not meet IPR criteria and will not be able to accept pt.   Patrice Saldivar

## 2019-02-24 NOTE — PROGRESS NOTES
Called on call MD Dr. Louise Rousseau regarding blood sugar of 384 and requiring MD to be notified he stated to give the highest dose on the scale.

## 2019-02-24 NOTE — PROGRESS NOTES
Bedside shift change report given to Reinaldo Smith (oncoming nurse) by Raymundo Hoyos RN (offgoing nurse). Report included the following information SBAR, Kardex, Intake/Output, MAR and Recent Results.

## 2019-02-24 NOTE — PROGRESS NOTES
Michi Marquiselsen [facility] [address]  [address] [facility], [address] [place] [address]  [phone]       Medical Progress Note      NAME: Flaca Horne   :  1945  MRM:  [id number]    Date/Time: 2019  11:00 AM       Assessment and Plan:   1. Back pain / Debility, likely musculoskeletal. POA. Non-traumatic. MRI of L spine showed mild degenerative disease. Pain control. PT/OT evaluation. S/p IV steroid. [facility] consulted      2. Hypertension. BP okay. Continue amlodipine, HCTZ and losartan.      3. CAD / Hx of CABG. Stable. No CP. 4.  DM. Continue home lantus, glipizide and cover with SSI.      5.  Gout. No active gout at present. Subjective:     Chief Complaint:  Follow up of pt who was admitted with back pain. Back pain is better, but still has some. ROS:  (bold if positive, if negative)      Tolerating PT  Tolerating Diet        Objective:     Last 24hrs VS reviewed since prior progress note.  Most recent are:    Visit Vitals  /63 (BP 1 Location: Right arm, BP Patient Position: Sitting)   Pulse 61   Temp 97.6 °F (36.4 °C)   Resp 16   Ht 5' 3\" (1.6 m)   Wt 86.2 kg (190 lb)   SpO2 94%   BMI 33.66 kg/m²     SpO2 Readings from Last 6 Encounters:   19 94%   19 97%          No intake or output data in the 24 hours ending 19 1013     Physical Exam:    Gen:  Well-developed, well-nourished, in no acute distress  HEENT:  Pink conjunctivae, PERRL, hearing intact to voice, moist mucous membranes  Neck:  Supple, without masses, thyroid non-tender  Resp:  No accessory muscle use, clear breath sounds without wheezes rales or rhonchi  Card:  No murmurs, normal S1, S2 without thrills, bruits or peripheral edema  Abd:  Soft, non-tender, non-distended, normoactive bowel sounds are present, no palpable organomegaly and no detectable hernias  Lymph:  No cervical or inguinal adenopathy  Musc:  No cyanosis or clubbing  Skin:  No rashes or ulcers, skin turgor is good  Neuro:  Cranial nerves are grossly intact, no focal motor weakness, follows commands appropriately  Psych:  Good insight, oriented to person, place and time, alert  __________________________________________________________________  Medications Reviewed: (see below)  Medications:     Current Facility-Administered Medications   Medication Dose Route Frequency    amLODIPine (NORVASC) tablet 10 mg  10 mg Oral DAILY    atorvastatin (LIPITOR) tablet 20 mg  20 mg Oral DAILY    methylPREDNISolone (MEDROL) tablet 4 mg  4 mg Oral TID WITH MEALS    Followed by   Adrian methylPREDNISolone (MEDROL) tablet 8 mg  8 mg Oral ONCE    Followed by   Eliel Comings ON 2/25/2019] methylPREDNISolone (MEDROL) tablet 4 mg  4 mg Oral QID WITH MEALS    Followed by   Eliel Comings ON 2/26/2019] methylPREDNISolone (MEDROL) tablet 4 mg  4 mg Oral TID    Followed by   Eliel Comings ON 2/27/2019] methylPREDNISolone (MEDROL) tablet 4 mg  4 mg Oral BID    Followed by   Eliel Comings ON 2/28/2019] methylPREDNISolone (MEDROL) tablet 4 mg  4 mg Oral ACB    sodium chloride (NS) flush 5-40 mL  5-40 mL IntraVENous Q8H    sodium chloride (NS) flush 5-40 mL  5-40 mL IntraVENous PRN    naloxone (NARCAN) injection 0.4 mg  0.4 mg IntraVENous PRN    0.9% sodium chloride infusion  50 mL/hr IntraVENous CONTINUOUS    acetaminophen (TYLENOL) tablet 650 mg  650 mg Oral Q4H PRN    HYDROcodone-acetaminophen (NORCO) 5-325 mg per tablet 1 Tab  1 Tab Oral Q4H PRN    ondansetron (ZOFRAN) injection 4 mg  4 mg IntraVENous Q4H PRN    docusate sodium (COLACE) capsule 100 mg  100 mg Oral BID    enoxaparin (LOVENOX) injection 40 mg  40 mg SubCUTAneous Q24H    dextrose (D50W) injection syrg 12.5-25 g  25-50 mL IntraVENous PRN    tiZANidine (ZANAFLEX) tablet 4 mg  4 mg Oral Q6H PRN    lidocaine 4 % patch 1 Patch  1 Patch TransDERmal Q24H    insulin glargine (LANTUS) injection 42 Units  42 Units SubCUTAneous QHS    And    insulin glargine (LANTUS) injection 42 Units  42 Units SubCUTAneous QHS    insulin lispro (HUMALOG) injection   SubCUTAneous AC&HS    glucose chewable tablet 16 g  4 Tab Oral PRN    dextrose (D50W) injection syrg 12.5-25 g  25-50 mL IntraVENous PRN    glucagon (GLUCAGEN) injection 1 mg  1 mg IntraMUSCular PRN    HYDROmorphone (PF) (DILAUDID) injection 1 mg  1 mg IntraVENous Q4H PRN    glipiZIDE (GLUCOTROL) tablet 5 mg  5 mg Oral BID WITH MEALS    colchicine (MITIGARE) capsule 0.6 mg  0.6 mg Oral DAILY PRN    losartan (COZAAR) tablet 100 mg  100 mg Oral DAILY    And    hydroCHLOROthiazide (HYDRODIURIL) tablet 12.5 mg  12.5 mg Oral DAILY        Lab Data Reviewed: (see below)  Lab Review:     Recent Labs     02/23/19 0228 02/22/19 0352   WBC 6.0 6.5   HGB 12.1 12.0   HCT 36.8 36.8    206     Recent Labs     02/23/19 0228 02/22/19 0352    140   K 4.1 3.7    103   CO2 25 27   * 142*   BUN 27* 23*   CREA 1.11* 1.12*   CA 9.3 9.4   MG 2.3  --    PHOS 2.5*  --    ALB  --  3.5   TBILI  --  0.3   SGOT  --  20   ALT  --  25     Lab Results   Component Value Date/Time    Glucose (POC) 184 (H) 02/24/2019 07:01 AM    Glucose (POC) 384 (H) 02/23/2019 09:10 PM    Glucose (POC) 254 (H) 02/23/2019 04:18 PM    Glucose (POC) 308 (H) 02/23/2019 10:59 AM    Glucose (POC) 250 (H) 02/23/2019 07:53 AM     No results for input(s): PH, PCO2, PO2, HCO3, FIO2 in the last 72 hours. No results for input(s): INR in the last 72 hours. No lab exists for component: [place]  All Micro Results     Procedure Component Value Units Date/Time    URINE CULTURE HOLD SAMPLE [[id number]] Collected:  02/22/19 1627    Order Status:  Completed Specimen:  Urine from Serum Updated:  02/22/19 0620     Urine culture hold       URINE ON HOLD IN MICROBIOLOGY DEPT FOR 3 DAYS. IF UNPRESERVED URINE IS SUBMITTED, IT CANNOT BE USED FOR ADDITIONAL TESTING AFTER 24 HRS, RECOLLECTION WILL BE REQUIRED. I have reviewed notes of prior 24hr. Other pertinent lab:       Total time spent with patient: [address] discussed with: Patient, Nursing Staff and >50% of time spent in counseling and coordination of care    Discussed:  Care Plan    Prophylaxis:  Lovenox    Disposition:  Home w/Family           ___________________________________________________    Attending Physician: Huy Vega MD

## 2019-02-25 LAB
GLUCOSE BLD STRIP.AUTO-MCNC: 153 MG/DL (ref 65–100)
GLUCOSE BLD STRIP.AUTO-MCNC: 168 MG/DL (ref 65–100)
GLUCOSE BLD STRIP.AUTO-MCNC: 223 MG/DL (ref 65–100)
GLUCOSE BLD STRIP.AUTO-MCNC: 83 MG/DL (ref 65–100)
SERVICE CMNT-IMP: ABNORMAL
SERVICE CMNT-IMP: NORMAL

## 2019-02-25 PROCEDURE — 74011000250 HC RX REV CODE- 250: Performed by: INTERNAL MEDICINE

## 2019-02-25 PROCEDURE — 82962 GLUCOSE BLOOD TEST: CPT

## 2019-02-25 PROCEDURE — 96372 THER/PROPH/DIAG INJ SC/IM: CPT

## 2019-02-25 PROCEDURE — 94760 N-INVAS EAR/PLS OXIMETRY 1: CPT

## 2019-02-25 PROCEDURE — 96376 TX/PRO/DX INJ SAME DRUG ADON: CPT

## 2019-02-25 PROCEDURE — 74011250637 HC RX REV CODE- 250/637: Performed by: INTERNAL MEDICINE

## 2019-02-25 PROCEDURE — 74011250636 HC RX REV CODE- 250/636: Performed by: INTERNAL MEDICINE

## 2019-02-25 PROCEDURE — 74011636637 HC RX REV CODE- 636/637: Performed by: INTERNAL MEDICINE

## 2019-02-25 PROCEDURE — 74011250636 HC RX REV CODE- 250/636: Performed by: PHYSICIAN ASSISTANT

## 2019-02-25 PROCEDURE — 97116 GAIT TRAINING THERAPY: CPT

## 2019-02-25 PROCEDURE — 99218 HC RM OBSERVATION: CPT

## 2019-02-25 PROCEDURE — 97530 THERAPEUTIC ACTIVITIES: CPT

## 2019-02-25 PROCEDURE — 97535 SELF CARE MNGMENT TRAINING: CPT

## 2019-02-25 RX ORDER — POLYETHYLENE GLYCOL 3350 17 G/17G
17 POWDER, FOR SOLUTION ORAL DAILY
Status: DISCONTINUED | OUTPATIENT
Start: 2019-02-25 | End: 2019-02-26 | Stop reason: HOSPADM

## 2019-02-25 RX ADMIN — METHYLPREDNISOLONE 4 MG: 4 TABLET ORAL at 17:05

## 2019-02-25 RX ADMIN — HYDROMORPHONE HYDROCHLORIDE 1 MG: 2 INJECTION INTRAMUSCULAR; INTRAVENOUS; SUBCUTANEOUS at 21:54

## 2019-02-25 RX ADMIN — HYDROCODONE BITARTRATE AND ACETAMINOPHEN 1 TABLET: 5; 325 TABLET ORAL at 19:11

## 2019-02-25 RX ADMIN — INSULIN LISPRO 3 UNITS: 100 INJECTION, SOLUTION INTRAVENOUS; SUBCUTANEOUS at 11:30

## 2019-02-25 RX ADMIN — HYDROCHLOROTHIAZIDE 12.5 MG: 25 TABLET ORAL at 10:39

## 2019-02-25 RX ADMIN — GLIPIZIDE 5 MG: 5 TABLET ORAL at 17:05

## 2019-02-25 RX ADMIN — Medication 5 ML: at 12:30

## 2019-02-25 RX ADMIN — INSULIN GLARGINE 42 UNITS: 100 INJECTION, SOLUTION SUBCUTANEOUS at 21:54

## 2019-02-25 RX ADMIN — Medication 10 ML: at 21:55

## 2019-02-25 RX ADMIN — HYDROCODONE BITARTRATE AND ACETAMINOPHEN 1 TABLET: 5; 325 TABLET ORAL at 09:46

## 2019-02-25 RX ADMIN — METHYLPREDNISOLONE 4 MG: 4 TABLET ORAL at 10:40

## 2019-02-25 RX ADMIN — LOSARTAN POTASSIUM 100 MG: 50 TABLET, FILM COATED ORAL at 10:39

## 2019-02-25 RX ADMIN — METHYLPREDNISOLONE 4 MG: 4 TABLET ORAL at 12:25

## 2019-02-25 RX ADMIN — AMLODIPINE BESYLATE 10 MG: 5 TABLET ORAL at 10:40

## 2019-02-25 RX ADMIN — ATORVASTATIN CALCIUM 20 MG: 20 TABLET, FILM COATED ORAL at 10:40

## 2019-02-25 RX ADMIN — DOCUSATE SODIUM 100 MG: 100 CAPSULE, LIQUID FILLED ORAL at 10:40

## 2019-02-25 RX ADMIN — METHYLPREDNISOLONE 4 MG: 4 TABLET ORAL at 21:55

## 2019-02-25 RX ADMIN — INSULIN LISPRO 3 UNITS: 100 INJECTION, SOLUTION INTRAVENOUS; SUBCUTANEOUS at 17:05

## 2019-02-25 RX ADMIN — INSULIN LISPRO 2 UNITS: 100 INJECTION, SOLUTION INTRAVENOUS; SUBCUTANEOUS at 21:54

## 2019-02-25 RX ADMIN — ENOXAPARIN SODIUM 40 MG: 40 INJECTION SUBCUTANEOUS at 10:40

## 2019-02-25 RX ADMIN — DOCUSATE SODIUM 100 MG: 100 CAPSULE, LIQUID FILLED ORAL at 17:05

## 2019-02-25 RX ADMIN — GLIPIZIDE 5 MG: 5 TABLET ORAL at 09:46

## 2019-02-25 RX ADMIN — COLCHICINE 0.6 MG: 0.6 CAPSULE ORAL at 13:53

## 2019-02-25 NOTE — PROGRESS NOTES
Problem: Mobility Impaired (Adult and Pediatric)  Goal: *Acute Goals and Plan of Care (Insert Text)  Physical Therapy Goals  Initiated 2/22/2019  1. Patient will move from supine to sit and sit to supine  in bed with modified independence within 7 day(s). 2.  Patient will transfer from bed to chair and chair to bed with modified independence using the least restrictive device within 7 day(s). 3.  Patient will perform sit to stand with modified independence within 7 day(s). 4.  Patient will ambulate with modified independence for 250 feet with the least restrictive device within 7 day(s). 5.  Patient will ascend/descend 4 stairs with 1 handrail(s) with minimal assistance/contact guard assist within 7 day(s). physical Therapy TREATMENT  Patient: Scooter Herrera (65 y.o. female)  Date: 2/25/2019  Diagnosis: Back pain [M54.9] Back pain      Precautions: Fall  Chart, physical therapy assessment, plan of care and goals were reviewed. ASSESSMENT:  Pt received in chair, reporting increased pain overnight currently 7/10 sitting in chair. Pt demonstrates decreased dexterity in B hands, requiring >5min to don wrist splints. CGA for sit<>stand with increase pain 8-9/10 with transition into standing using RW. Reports pain subsides to 7/10 during gait training. Pt demonstrates slow, guarded gait pattern, tho no LOB. Pt expressed concerns managing at home alone. Pt did not feel she could attempt stairs today secondary to pain. Given both bilat UE impairments, balance & gait disturbance, and the fact she lives alone, she may well benefit from a short rehab stay to improve activity tolerance to maximize independence.   Progression toward goals:  []    Improving appropriately and progressing toward goals  [x]    Improving slowly and progressing toward goals  []    Not making progress toward goals and plan of care will be adjusted     PLAN:  Patient continues to benefit from skilled intervention to address the above impairments. Continue treatment per established plan of care. Discharge Recommendations:  Rehab  Further Equipment Recommendations for Discharge:  Rolling walker     SUBJECTIVE:   Patient stated Abdiel Magana know how I can manage with this at home.     OBJECTIVE DATA SUMMARY:   Critical Behavior:     Orientation Level: Oriented X4     Safety/Judgement: Awareness of environment, Insight into deficits  Functional Mobility Training:  Bed Mobility:                    Transfers:  Sit to Stand: Additional time;Contact guard assistance  Stand to Sit: Contact guard assistance; Additional time                             Balance:  Sitting: Intact  Standing: Impaired; Without support  Standing - Static: Constant support;Good  Standing - Dynamic : Fair  Ambulation/Gait Training:  Distance (ft): 80 Feet (ft)  Assistive Device: Walker, rolling;Gait belt  Ambulation - Level of Assistance: Contact guard assistance        Gait Abnormalities: Decreased step clearance        Base of Support: Widened     Speed/Siria: Slow                       Stairs:              Neuro Re-Education:    Therapeutic Exercises:     Pain:  Pain Scale 1: Numeric (0 - 10)              Pain Intervention(s) 1: Medication (see MAR)  Activity Tolerance:   fair  Please refer to the flowsheet for vital signs taken during this treatment.   After treatment:   [x]    Patient left in no apparent distress sitting up in chair  []    Patient left in no apparent distress in bed  [x]    Call bell left within reach  [x]    Nursing notified  []    Caregiver present  []    Bed alarm activated    COMMUNICATION/COLLABORATION:   The patients plan of care was discussed with: Registered Nurse    Keron Cash   Time Calculation: 30 mins

## 2019-02-25 NOTE — DIABETES MGMT
DTC Progress Note    Recommendations/ Comments: BG's improving as steroids tapered. Results were > 200 mg/dL  Today FBG 83 mg/dL and pre lunch 150 mg/dL  Steroids today re Medrol tab 4 mg 4x/day    If appropriate, please consider   Change lispro correction scale to normal sensitivity    Current hospital DM medication:   Lantus 84 units at bedtime  Glipizide 5 mg BID  Lispro resistant correction scale    Chart reviewed on Yulia Mendez. Patient is a 68 y.o. female with known DM on Glipizide 5 mg BID and Lantus 84 units daily at home    A1c:   Lab Results   Component Value Date/Time    Hemoglobin A1c 8.4 (H) 02/22/2019 03:52 AM       Recent Glucose Results:   Lab Results   Component Value Date/Time    GLUCPOC 153 (H) 02/25/2019 11:05 AM    GLUCPOC 83 02/25/2019 07:23 AM    GLUCPOC 228 (H) 02/24/2019 09:25 PM        Lab Results   Component Value Date/Time    Creatinine 1.11 (H) 02/23/2019 02:28 AM     Estimated Creatinine Clearance: 47 mL/min (A) (based on SCr of 1.11 mg/dL (H)). Active Orders   Diet    DIET DIABETIC CONSISTENT CARB Regular        PO intake:   Patient Vitals for the past 72 hrs:   % Diet Eaten   02/22/19 1915 100 %       Will continue to follow as needed.     Thank you  Shirley Esparza RN, CDE  Pager [phone]      Time spent: 5 min

## 2019-02-25 NOTE — PROGRESS NOTES
2/25/2019 5:17 PM Pt's status was reviewed and cannot be switched to inpatient. Met with pt and pt's 2 sisters and relayed under Medicare pt cannot be upgraded to inpatient. Pt and pt's sisters were understanding pt does not qualify for inpatient status nor inpatient rehab. CM also relayed SNF will not be covered under Medicare. Pt and pt's sisters expressed their frustration. CM relayed that pt will need assistance at home after discharge. Pt's sisters relayed they cannot assist pt at home due to their own medical limitations. CM discussed personal care at home and private pay for SNF. Pt reported she does not have the funds for either of these services. CM also discussed IPR at [email or web address], pt was open to this being researched. CM discussed home health and dme. CM will follow up.     2/25/2019 3:13 PM W acute rehab has denied pt. PT and OT still recommending rehab. CM sent message to [facility] for possible upgrade to inpatient for possible SNF placement. Pt is aware of denials and status as Obs.     2/25/2019 10:31 AM Pt has been denied by [facility]. Awaiting response from [address] [place] [address] acute rehab. CM will follow up.     2/25/2019  8:24 AM Consult for rehab received. Referrals pending with [address] [place] [address] acute rehab and [facility]. Messages sent this morning to pending placements. CM will follow up.  DANIELA GunnW

## 2019-02-25 NOTE — PROGRESS NOTES
Problem: Self Care Deficits Care Plan (Adult)  Goal: *Acute Goals and Plan of Care (Insert Text)  Occupational Therapy Goals  Initiated 2/23/2019  1. Patient will perform grooming tasks in standing with modified independence within 7 day(s). 2.  Patient will perform bathing with modified independence within 7 day(s). 3.  Patient will perform lower body dressing with modified independence (using AE prn) within 7 day(s). 4.  Patient will perform toilet transfers with modified independence within 7 day(s). 5.  Patient will perform all aspects of toileting with modified independence within 7 day(s). Occupational Therapy TREATMENT  Patient: Indira Desai (33 y.o. female)  Date: 2/25/2019  Diagnosis: Back pain [M54.9] Back pain      Precautions: Fall  Chart, occupational therapy assessment, plan of care, and goals were reviewed. ASSESSMENT:  Pt verbalizing 5.5/6 back pain seated in chair following activity this AM but agreeable to OT. She was educated as to AE for bathing and dressing. She stated she already worked on her AM routine this morning and really did not want to go through it again but willing to work with doffing/donning socks. She was able to use dressing stick, reacher, sock aide to task with increased time due to bilateral hand splints and  decreased dexterity and coordination to dressing task. She seemed to understand benefits of AE. Pt very motivated to improve status. She has hand exercise worksheet present on her table which she received from [address] [place] [address] pt therapy as she has carpal tunnel syndrome. Recommending rehab. Progression toward goals:  [x]       Improving appropriately and progressing toward goals  []       Improving slowly and progressing toward goals  []       Not making progress toward goals and plan of care will be adjusted     PLAN:  Patient continues to benefit from skilled intervention to address the above impairments.   Continue treatment per established plan of care. Discharge Recommendations: Rehab  Further Equipment Recommendations for Discharge: None     SUBJECTIVE:   Patient stated I just pray.     OBJECTIVE DATA SUMMARY:   Cognitive/Behavioral Status:  Neurologic State: Alert; Appropriate for age  Orientation Level: Oriented X4  Cognition: Appropriate decision making             Functional Mobility and Transfers for ADLs:  Bed Mobility:     Not tested as pt already up in chair  Transfers:  Sit to Stand: Additional time;Contact guard assistance          Balance:  Sitting: Intact  Standing: Impaired; Without support  Standing - Static: Constant support;Good  Standing - Dynamic : Fair    ADL Intervention:                           Lower Body Dressing Assistance  Socks: Moderate assistance  Leg Crossed Method Used: No  Position Performed: Seated in chair  Cues: Doff;Don;Physical assistance;Verbal cues provided  Adaptive Equipment Used: Dressing stick; Reacher;Sock aid       Pain:  Pain Scale 1: Numeric (0 - 10)              Pain Intervention(s) 1: Medication (see MAR)  Activity Tolerance:   Fair  Please refer to the flowsheet for vital signs taken during this treatment.   After treatment:   [x] Patient left in no apparent distress sitting up in chair  [] Patient left in no apparent distress in bed  [x] Call bell left within reach  [] Nursing notified  [] Caregiver present  [] Bed alarm activated    COMMUNICATION/COLLABORATION:   The patients plan of care was discussed with: Occupational Therapist    JESSE Oliva  Time Calculation: 19 mins

## 2019-02-25 NOTE — PROGRESS NOTES
Michi Gamboa [facility] [address]  [address]  [phone]       Medical Progress Note      NAME: Neal Conde   :  1945  MRM:  [id number]    Date/Time: 2019  11:00 AM       Assessment and Plan:   1. Back pain / Debility, likely musculoskeletal. POA. Non-traumatic. MRI of L spine showed mild degenerative disease. Pain control. PT/OT evaluation. On medrol toyin. [facility] and [facility] denies her. Continue PT/OT       2.  Hypertension. BP okay. Continue amlodipine, HCTZ and losartan.      3. CAD / Hx of CABG. Stable. No CP. 4.  DM. Continue home lantus, glipizide and cover with SSI.      5.  Gout. No active gout at present. Subjective:     Chief Complaint:  Follow up of pt who was admitted with back pain. Back pain is better, but still has some pain. She lives by herself and worries how to take care of herself at home      ROS:  (bold if positive, if negative)      Tolerating PT  Tolerating Diet        Objective:     Last 24hrs VS reviewed since prior progress note.  Most recent are:    Visit Vitals  /79 (BP 1 Location: Left arm, BP Patient Position: Sitting)   Pulse 61   Temp 98 °F (36.7 °C)   Resp 16   Ht 5' 3\" (1.6 m)   Wt 86.2 kg (190 lb)   SpO2 96%   BMI 33.66 kg/m²     SpO2 Readings from Last 6 Encounters:   19 96%   19 97%          No intake or output data in the 24 hours ending 19 1011     Physical Exam:    Gen:  Well-developed, well-nourished, in no acute distress  HEENT:  Pink conjunctivae, PERRL, hearing intact to voice, moist mucous membranes  Neck:  Supple, without masses, thyroid non-tender  Resp:  No accessory muscle use, clear breath sounds without wheezes rales or rhonchi  Card:  No murmurs, normal S1, S2 without thrills, bruits or peripheral edema  Abd:  Soft, non-tender, non-distended, normoactive bowel sounds are present, no palpable organomegaly and no detectable hernias  Lymph:  No cervical or inguinal adenopathy  Musc:  No cyanosis or clubbing  Skin:  No rashes or ulcers, skin turgor is good  Neuro:  Cranial nerves are grossly intact, no focal motor weakness, follows commands appropriately  Psych:  Good insight, oriented to person, place and time, alert  __________________________________________________________________  Medications Reviewed: (see below)  Medications:     Current Facility-Administered Medications   Medication Dose Route Frequency    amLODIPine (NORVASC) tablet 10 mg  10 mg Oral DAILY    atorvastatin (LIPITOR) tablet 20 mg  20 mg Oral DAILY    cyclobenzaprine (FLEXERIL) tablet 10 mg  10 mg Oral TID PRN    methylPREDNISolone (MEDROL) tablet 4 mg  4 mg Oral QID WITH MEALS    Followed by   Queta Rivera ON 2/26/2019] methylPREDNISolone (MEDROL) tablet 4 mg  4 mg Oral TID    Followed by   Queta Rivera ON 2/27/2019] methylPREDNISolone (MEDROL) tablet 4 mg  4 mg Oral BID    Followed by   Queta Rivera ON 2/28/2019] methylPREDNISolone (MEDROL) tablet 4 mg  4 mg Oral ACB    sodium chloride (NS) flush 5-40 mL  5-40 mL IntraVENous Q8H    sodium chloride (NS) flush 5-40 mL  5-40 mL IntraVENous PRN    naloxone (NARCAN) injection 0.4 mg  0.4 mg IntraVENous PRN    0.9% sodium chloride infusion  50 mL/hr IntraVENous CONTINUOUS    acetaminophen (TYLENOL) tablet 650 mg  650 mg Oral Q4H PRN    HYDROcodone-acetaminophen (NORCO) 5-325 mg per tablet 1 Tab  1 Tab Oral Q4H PRN    ondansetron (ZOFRAN) injection 4 mg  4 mg IntraVENous Q4H PRN    docusate sodium (COLACE) capsule 100 mg  100 mg Oral BID    enoxaparin (LOVENOX) injection 40 mg  40 mg SubCUTAneous Q24H    dextrose (D50W) injection syrg 12.5-25 g  25-50 mL IntraVENous PRN    lidocaine 4 % patch 1 Patch  1 Patch TransDERmal Q24H    insulin glargine (LANTUS) injection 42 Units  42 Units SubCUTAneous QHS    And    insulin glargine (LANTUS) injection 42 Units  42 Units SubCUTAneous QHS    insulin lispro (HUMALOG) injection   SubCUTAneous AC&HS    glucose chewable tablet 16 g  4 Tab Oral PRN    dextrose (D50W) injection syrg 12.5-25 g  25-50 mL IntraVENous PRN    glucagon (GLUCAGEN) injection 1 mg  1 mg IntraMUSCular PRN    HYDROmorphone (PF) (DILAUDID) injection 1 mg  1 mg IntraVENous Q4H PRN    glipiZIDE (GLUCOTROL) tablet 5 mg  5 mg Oral BID WITH MEALS    colchicine (MITIGARE) capsule 0.6 mg  0.6 mg Oral DAILY PRN    losartan (COZAAR) tablet 100 mg  100 mg Oral DAILY    And    hydroCHLOROthiazide (HYDRODIURIL) tablet 12.5 mg  12.5 mg Oral DAILY        Lab Data Reviewed: (see below)  Lab Review:     Recent Labs     02/23/19 0228   WBC 6.0   HGB 12.1   HCT 36.8        Recent Labs     02/23/19 0228      K 4.1      CO2 25   *   BUN 27*   CREA 1.11*   CA 9.3   MG 2.3   PHOS 2.5*     Lab Results   Component Value Date/Time    Glucose (POC) 83 02/25/2019 07:23 AM    Glucose (POC) 228 (H) 02/24/2019 09:25 PM    Glucose (POC) 263 (H) 02/24/2019 03:45 PM    Glucose (POC) 242 (H) 02/24/2019 11:20 AM    Glucose (POC) 184 (H) 02/24/2019 07:01 AM     No results for input(s): PH, PCO2, PO2, HCO3, FIO2 in the last 72 hours. No results for input(s): INR in the last 72 hours. No lab exists for component: Liudmila Chan  All Micro Results     Procedure Component Value Units Date/Time    URINE CULTURE HOLD SAMPLE [[id number]] Collected:  02/22/19 2775    Order Status:  Completed Specimen:  Urine from Serum Updated:  02/22/19 0620     Urine culture hold       URINE ON HOLD IN MICROBIOLOGY DEPT FOR 3 DAYS. IF UNPRESERVED URINE IS SUBMITTED, IT CANNOT BE USED FOR ADDITIONAL TESTING AFTER 24 HRS, RECOLLECTION WILL BE REQUIRED. I have reviewed notes of prior 24hr. Other pertinent lab:       Total time spent with patient: 30 [address] discussed with: Patient, Nursing Staff and >50% of time spent in counseling and coordination of care    Discussed:  Care Plan    Prophylaxis:  Lovenox    Disposition:  Home w/Family           ___________________________________________________    Attending Physician: Shanna Walter MD

## 2019-02-25 NOTE — PROGRESS NOTES
Shift Summary    Bedside and Verbal shift change report given to Hannah Matias RN (oncoming nurse) by Janeth Yusuf (offgoing nurse). Report included the following information SBAR, Kardex, MAR and Recent Results. Patient up in chair talking on the phone. No needs at this time. Family at bedside. Bedside and Verbal shift change report given to [employer] (oncoming nurse) by LUCY Sctot RN (offgoing nurse). Report included the following information SBAR, Kardex, MAR and Recent Results.

## 2019-02-26 LAB
GLUCOSE BLD STRIP.AUTO-MCNC: 101 MG/DL (ref 65–100)
GLUCOSE BLD STRIP.AUTO-MCNC: 154 MG/DL (ref 65–100)
GLUCOSE BLD STRIP.AUTO-MCNC: 242 MG/DL (ref 65–100)
GLUCOSE BLD STRIP.AUTO-MCNC: 52 MG/DL (ref 65–100)
SERVICE CMNT-IMP: ABNORMAL

## 2019-02-26 PROCEDURE — 74011000250 HC RX REV CODE- 250: Performed by: INTERNAL MEDICINE

## 2019-02-26 PROCEDURE — 94760 N-INVAS EAR/PLS OXIMETRY 1: CPT

## 2019-02-26 PROCEDURE — 96372 THER/PROPH/DIAG INJ SC/IM: CPT

## 2019-02-26 PROCEDURE — 74011636637 HC RX REV CODE- 636/637: Performed by: INTERNAL MEDICINE

## 2019-02-26 PROCEDURE — 74011250636 HC RX REV CODE- 250/636: Performed by: INTERNAL MEDICINE

## 2019-02-26 PROCEDURE — 97535 SELF CARE MNGMENT TRAINING: CPT

## 2019-02-26 PROCEDURE — 97530 THERAPEUTIC ACTIVITIES: CPT

## 2019-02-26 PROCEDURE — 99218 HC RM OBSERVATION: CPT

## 2019-02-26 PROCEDURE — 74011250637 HC RX REV CODE- 250/637: Performed by: INTERNAL MEDICINE

## 2019-02-26 PROCEDURE — 97116 GAIT TRAINING THERAPY: CPT

## 2019-02-26 PROCEDURE — 74011250636 HC RX REV CODE- 250/636: Performed by: PHYSICIAN ASSISTANT

## 2019-02-26 PROCEDURE — 82962 GLUCOSE BLOOD TEST: CPT

## 2019-02-26 RX ORDER — HYDROCODONE BITARTRATE AND ACETAMINOPHEN 5; 325 MG/1; MG/1
1 TABLET ORAL
Qty: 20 TAB | Refills: 0 | Status: SHIPPED | OUTPATIENT
Start: 2019-02-26 | End: 2019-03-01

## 2019-02-26 RX ORDER — CYCLOBENZAPRINE HCL 10 MG
10 TABLET ORAL
Qty: 30 TAB | Refills: 0 | Status: SHIPPED | OUTPATIENT
Start: 2019-02-26

## 2019-02-26 RX ADMIN — HYDROCODONE BITARTRATE AND ACETAMINOPHEN 1 TABLET: 5; 325 TABLET ORAL at 01:32

## 2019-02-26 RX ADMIN — ENOXAPARIN SODIUM 40 MG: 40 INJECTION SUBCUTANEOUS at 09:11

## 2019-02-26 RX ADMIN — HYDROCODONE BITARTRATE AND ACETAMINOPHEN 1 TABLET: 5; 325 TABLET ORAL at 06:58

## 2019-02-26 RX ADMIN — CYCLOBENZAPRINE HYDROCHLORIDE 10 MG: 10 TABLET, FILM COATED ORAL at 00:04

## 2019-02-26 RX ADMIN — AMLODIPINE BESYLATE 10 MG: 5 TABLET ORAL at 09:03

## 2019-02-26 RX ADMIN — METHYLPREDNISOLONE 4 MG: 4 TABLET ORAL at 09:10

## 2019-02-26 RX ADMIN — LOSARTAN POTASSIUM 100 MG: 50 TABLET, FILM COATED ORAL at 09:08

## 2019-02-26 RX ADMIN — INSULIN LISPRO 3 UNITS: 100 INJECTION, SOLUTION INTRAVENOUS; SUBCUTANEOUS at 12:38

## 2019-02-26 RX ADMIN — DOCUSATE SODIUM 100 MG: 100 CAPSULE, LIQUID FILLED ORAL at 09:12

## 2019-02-26 RX ADMIN — ATORVASTATIN CALCIUM 20 MG: 20 TABLET, FILM COATED ORAL at 09:07

## 2019-02-26 RX ADMIN — METHYLPREDNISOLONE 4 MG: 4 TABLET ORAL at 12:39

## 2019-02-26 RX ADMIN — HYDROCHLOROTHIAZIDE 12.5 MG: 25 TABLET ORAL at 09:06

## 2019-02-26 RX ADMIN — Medication 10 ML: at 06:15

## 2019-02-26 NOTE — PROGRESS NOTES
Patient discharged to facility at this time, alert and oriented x 4 able to make needs known, resp even and unlabored. No distress noted. Denies pain or discomfort when asked. Discharge instructions given to patient .  Called receiving facility to give report unable to reach receiving nurse at this time, spoke with staff stated they carmina l have receiving nurse give me a call back

## 2019-02-26 NOTE — PROGRESS NOTES
Problem: Mobility Impaired (Adult and Pediatric)  Goal: *Acute Goals and Plan of Care (Insert Text)  Physical Therapy Goals  Initiated 2/22/2019  1. Patient will move from supine to sit and sit to supine  in bed with modified independence within 7 day(s). 2.  Patient will transfer from bed to chair and chair to bed with modified independence using the least restrictive device within 7 day(s). 3.  Patient will perform sit to stand with modified independence within 7 day(s). 4.  Patient will ambulate with modified independence for 250 feet with the least restrictive device within 7 day(s). 5.  Patient will ascend/descend 4 stairs with 1 handrail(s) with minimal assistance/contact guard assist within 7 day(s). physical Therapy TREATMENT  Patient: Yulia Mendez (59 y.o. female)  Date: 2/26/2019  Diagnosis: Back pain [M54.9] Back pain      Precautions: Fall  Chart, physical therapy assessment, plan of care and goals were reviewed. ASSESSMENT:  Pt received in chair reporting 7/10 pain currently with increased pain overnight, unable to walk to bathroom and needed BSC. Reports towel roll placed in chair, yesterday to maintain lordotic curve did help while up in chair. CGA for functional transfers and gait training using RW for steadying,  with increased time to complete all mobility tasks. ascend/descend 4 steps using single handrail on L with increased time,(10 min ) verbal cues for sequencing with CGA x 2. No knee buckling noted tho pt performs very slowly with guarded gait. Pt lives alone and would benefit from rehab to improve functional mobility as pt was independent PTA  Progression toward goals:  []    Improving appropriately and progressing toward goals  [x]    Improving slowly and progressing toward goals  []    Not making progress toward goals and plan of care will be adjusted     PLAN:  Patient continues to benefit from skilled intervention to address the above impairments.   Continue treatment per established plan of care. Discharge Recommendations:  Inpatient Rehab  Further Equipment Recommendations for Discharge:  none     SUBJECTIVE:   Patient stated .    OBJECTIVE DATA SUMMARY:   Critical Behavior:  Neurologic State: Alert  Orientation Level: Oriented X4  Cognition: Appropriate decision making  Safety/Judgement: Awareness of environment, Insight into deficits  Functional Mobility Training:  Bed Mobility:                    Transfers:  Sit to Stand: Contact guard assistance; Additional time;Assist x1  Stand to Sit: Contact guard assistance; Additional time                             Balance:  Sitting: Intact  Standing: With support  Standing - Static: Constant support;Good  Standing - Dynamic : Fair  Ambulation/Gait Training:  Distance (ft): 40 Feet (ft)  Assistive Device: Walker, rolling;Gait belt  Ambulation - Level of Assistance: Contact guard assistance; Additional time        Gait Abnormalities: Decreased step clearance        Base of Support: Widened                             Stairs:  Number of Stairs Trained: 4  Stairs - Level of Assistance: Contact guard assistance; Additional time;Assist X2   Rail Use: Left     Neuro Re-Education:    Therapeutic Exercises:     Pain:  Pain Scale 1: Numeric (0 - 10)  Pain Intensity 1: 4  Pain Location 1: Back  Pain Orientation 1: Lower  Pain Description 1: Aching  Pain Intervention(s) 1: Medication (see MAR)  Activity Tolerance:   fair  Please refer to the flowsheet for vital signs taken during this treatment.   After treatment:   []    Patient left in no apparent distress sitting up in chair  []    Patient left in no apparent distress in bed  [x]    Call bell left within reach  [x]    Nursing notified  []    Caregiver present  []    Bed alarm activated    COMMUNICATION/COLLABORATION:   The patients plan of care was discussed with: Registered Nurse    Carolina Restrepo   Time Calculation: 40 mins

## 2019-02-26 NOTE — PROGRESS NOTES
2/26/2019 1:07 PM Hard scripts and discharge sent to [facility] via [address] [employer]. Wheelchair packet complete on pt's harchart. Transport through round trip will not be till 5PM, pt and pt's RN are aware. 2/26/2019 12:18 PM Spoke with pt who is agreeable to [facility]. Pt reported she does not have any family that can transport her to [facility]. CM scheduled wheelchair transport for 2PM through [address]. Nursing please call report to [facility] at [phone].    2/26/2019 11:36 AM [facility] has accepted pt, attempted to meet with pt to discuss, pt not available. CM will follow up.     2/26/2019 9:25 AM Spoke with [facility] liaison who will review pt and follow up.  JUSTIN Griffin

## 2019-02-26 NOTE — PROGRESS NOTES
Results for Jose Phillips (MRN [id number]) as of 2/26/2019 07:24   Ref. Range 2/26/2019 06:44 2/26/2019 07:23   GLUCOSE,FAST - POC Latest Ref Range: 65 - 100 mg/dL 52 (L) 101 (H)     HYPOGLYCEMIC EPISODE DOCUMENTATION    Patient with hypoglycemic episode(s) at 0644(time) on 2/26(date). BG value(s) pre-treatment 46    Was patient symptomatic? [] yes, [x] no  Patient was treated with the following rescue medications/treatments: [] D50                [] Glucose tablets                [] Glucagon                [x] 4oz juice                [] 6oz reg soda                [] 8oz low fat milk  BG value post-treatment: 10 Once BG treated and value greater than 80mg/dl, pt was provided with the following:  [] snack  [x] meal     BS drawn late due to patient requiring bathroom assistance and refusing to be checked while on the toilet. Next shift alerted to [facility] value    Verbal shift change report given to Jose C Ordaz (oncoming nurse) by Lily Lund RN (offgoing nurse). Report included the following information SBAR, Kardex, Intake/Output and MAR.

## 2019-02-26 NOTE — PROGRESS NOTES
Problem: Self Care Deficits Care Plan (Adult)  Goal: *Acute Goals and Plan of Care (Insert Text)  Occupational Therapy Goals  Initiated 2/23/2019  1. Patient will perform grooming tasks in standing with modified independence within 7 day(s). 2.  Patient will perform bathing with modified independence within 7 day(s). 3.  Patient will perform lower body dressing with modified independence (using AE prn) within 7 day(s). 4.  Patient will perform toilet transfers with modified independence within 7 day(s). 5.  Patient will perform all aspects of toileting with modified independence within 7 day(s). Occupational Therapy TREATMENT  Patient: Margarito Ellis (59 y.o. female)  Date: 2/26/2019  Diagnosis: Back pain [M54.9] Back pain      Precautions: Fall  Chart, occupational therapy assessment, plan of care, and goals were reviewed. ASSESSMENT:  Pt present seated in bathroom bathing. She needed assist to wash her back and lower LE's. She donned underwear and pants as well as socks with use of AE. Additional time needed due to carpal tunnel both hands. Educated pt as to safety during sit to stand and stand to sit. Pt has been accepted to [facility] re[facility]. Progression toward goals:  []       Improving appropriately and progressing toward goals  [x]       Improving slowly and progressing toward goals  []       Not making progress toward goals and plan of care will be adjusted     PLAN:  Patient continues to benefit from skilled intervention to address the above impairments. Continue treatment per established plan of care. Discharge Recommendations: Rehab  Further Equipment Recommendations for Discharge: None     SUBJECTIVE:   Patient stated Thank you.     OBJECTIVE DATA SUMMARY:   Cognitive/Behavioral Status:  Neurologic State: Alert  Orientation Level: Oriented X4  Cognition: Appropriate decision making             Functional Mobility and Transfers for ADLs:  Bed Mobility:   Not tested as pt already up in the chair. Transfers:  Sit to Stand: Contact guard assistance; Additional time;Assist x1          Balance:  Sitting: Intact  Standing: With support  Standing - Static: Constant support;Good  Standing - Dynamic : Fair    ADL Intervention:            Upper Body Bathing  Bathing Assistance: Stand-by assistance  Position Performed: Seated in chair  Cues: Physical assistance(to wash her back)    Lower Body Bathing  Lower Body : Maximum assistance  Position Performed: Seated in chair  Cues: Physical assistance    Upper Body Dressing Assistance  Bra: Maximum assistance(to manage fasteners)  Pullover Shirt: Stand-by assistance    Lower Body Dressing Assistance  Socks: Moderate assistance  Leg Crossed Method Used: No  Position Performed: Seated in chair  Cues: Don;Physical assistance;Verbal cues provided  Adaptive Equipment Used: Reacher;Sock aid       Pain:  Pain Scale 1: Numeric (0 - 10)  Pain Intensity 1: 4  Pain Location 1: Back  Pain Orientation 1: Lower  Pain Description 1: Aching  Pain Intervention(s) 1: Medication (see MAR)  Activity Tolerance:   Fair  Please refer to the flowsheet for vital signs taken during this treatment.   After treatment:   [x] Patient left in no apparent distress sitting up in chair  [] Patient left in no apparent distress in bed  [x] Call bell left within reach  [] Nursing notified  [] Caregiver present  [] Bed alarm activated    COMMUNICATION/COLLABORATION:   The patients plan of care was discussed with: Physical Therapy Assistant, Occupational Therapist and     RENATO Lo/L  Time Calculation: 35 mins

## 2019-02-26 NOTE — DISCHARGE SUMMARY
Hospitalist Discharge Summary     Patient ID:    Joel Watson  [id number]  58 y.o.  1945    Admit date: 2/21/2019    Discharge date and time: 2/26/2019    Admission Diagnoses: Back pain [M54.9]    Chronic Diagnoses:    Problem List as of 2/26/2019 Never Reviewed          Codes Class Noted - Resolved    * (Principal) Back pain ICD-10-CM: M54.9  ICD-9-CM: 724.5  2/22/2019 - Present        Debility ICD-10-CM: R53.81  ICD-9-CM: 799.3  2/22/2019 - Present        CAD (coronary artery disease) ICD-10-CM: I25.10  ICD-9-CM: 414.00  2/22/2019 - Present        HTN (hypertension) ICD-10-CM: I10  ICD-9-CM: 401.9  2/22/2019 - Present        Gout ICD-10-CM: M10.9  ICD-9-CM: 274.9  2/22/2019 - Present              Discharge Medications:   Current Discharge Medication List      START taking these medications    Details   cyclobenzaprine (FLEXERIL) 10 mg tablet Take 1 Tab by mouth three (3) times daily as needed for Muscle Spasm(s). Qty: 30 Tab, Refills: 0      HYDROcodone-acetaminophen (NORCO) 5-325 mg per tablet Take 1 Tab by mouth every four (4) hours as needed for up to 3 days. Max Daily Amount: 6 Tabs. Qty: 20 Tab, Refills: 0    Associated Diagnoses: Acute bilateral low back pain without sciatica         CONTINUE these medications which have NOT CHANGED    Details   metFORMIN (GLUCOPHAGE) 1,000 mg tablet Take 1,000 mg by mouth two (2) times a day. losartan-hydroCHLOROthiazide (HYZAAR) 100-12.5 mg per tablet Take 1 Tab by mouth daily. gabapentin (NEURONTIN) 100 mg capsule Take 100 mg by mouth two (2) times daily as needed. carvedilol (COREG) 12.5 mg tablet Take 12.5 mg by mouth two (2) times a day. amLODIPine (NORVASC) 10 mg tablet Take 10 mg by mouth daily. atorvastatin (LIPITOR) 20 mg tablet Take 20 mg by mouth daily. colchicine 0.6 mg tablet Take 0.6 mg by mouth as needed. LANTUS SOLOSTAR U-100 INSULIN 100 unit/mL (3 mL) inpn 84 Units by SubCUTAneous route daily.       indomethacin (INDOCIN) 50 mg capsule Take 50 mg by mouth as needed. glipiZIDE (GLUCOTROL) 5 mg tablet Take 5 mg by mouth two (2) times a day. Follow up Care:    1. Sunita Rodgers MD in 1-2 weeks  2. Diet:  Diabetic Diet    Disposition:  Home. Advanced Directive:    Discharge Exam:  See today's note. CONSULTATIONS: Orthopedic Surgery    Significant Diagnostic Studies:   No results for input(s): WBC, HGB, HCT, PLT, HGBEXT, HCTEXT, PLTEXT in the last 72 hours. No results for input(s): NA, K, CL, CO2, BUN, CREA, GLU, CA, MG, PHOS, URICA in the last 72 hours. No results for input(s): SGOT, GPT, ALT, AP, TBIL, TBILI, TP, ALB, GLOB, GGT, AML, LPSE in the last 72 hours. No lab exists for component: AMYP, HLPSE  No results for input(s): INR, PTP, APTT in the last 72 hours. No lab exists for component: INREXT   No results for input(s): FE, TIBC, PSAT, FERR in the last 72 hours. No results for input(s): PH, PCO2, PO2 in the last 72 hours. No results for input(s): CPK, CKMB in the last 72 hours. No lab exists for component: TROPONINI  Lab Results   Component Value Date/Time    Glucose (POC) 154 (H) 02/26/2019 10:59 AM    Glucose (POC) 101 (H) 02/26/2019 07:23 AM    Glucose (POC) 52 (L) 02/26/2019 06:44 AM    Glucose (POC) 223 (H) 02/25/2019 09:10 PM    Glucose (POC) 168 (H) 02/25/2019 04:57 PM             HOSPITAL COURSE & TREATMENT RENDERED:   1.  Back pain / Debility, likely musculoskeletal. POA. Non-traumatic. MRI of L spine showed mild degenerative disease. Pain control. PT/OT evaluation. On medrol toyin. [facility] and [facility] denies her. Continue PT/OT. Evaluated by orho. accepted to rehab      2. Hypertension. BP okay. Continue amlodipine, HCTZ and losartan.      3. CAD / Hx of CABG. Stable. No CP.       4.  DM. Episode of hypoglycemia. Hold lantus and glipizide for now. Cover with SSI.      5.  Gout.  No active gout at present.     Discharged in stable condition         Signed:  Kvng Cam, MD  2/26/2019  12:25 PM

## 2019-02-26 NOTE — PROGRESS NOTES
Problem: Falls - Risk of  Goal: *Absence of Falls  Document Sierra Fall Risk and appropriate interventions in the flowsheet.   Outcome: Progressing Towards Goal  Fall Risk Interventions:  Mobility Interventions: Bed/chair exit alarm, Communicate number of staff needed for ambulation/transfer, Patient to call before getting OOB         Medication Interventions: Bed/chair exit alarm, Evaluate medications/consider consulting pharmacy, Patient to call before getting OOB, Teach patient to arise slowly, Utilize gait belt for transfers/ambulation    Elimination Interventions: Bed/chair exit alarm, Call light in reach, Elevated toilet seat, Patient to call for help with toileting needs, Toilet paper/wipes in reach, Toileting schedule/hourly rounds

## 2019-02-26 NOTE — PROGRESS NOTES
Michi Gamboa carolin [address]  [address] [facility], [place], [id number] [place] [address]  [phone]       Medical Progress Note      NAME: Aliza Mackey   :  1945  MRM:  [id number]    Date/Time: 2019  11:00 AM       Assessment and Plan:   1. Back pain / Debility, likely musculoskeletal. POA. Non-traumatic. MRI of L spine showed mild degenerative disease. Pain control. PT/OT evaluation. On medrol toyin. [facility] and [facility] denies her. Continue PT/OT. Evaluated by orho.      2. Hypertension. BP okay. Continue amlodipine, HCTZ and losartan.      3. CAD / Hx of CABG. Stable. No CP. 4.  DM. Episode of hypoglycemia. Hold lantus and glipizide for now. Cover with SSI.      5.  Gout. No active gout at present. Subjective:     Chief Complaint:  Follow up of pt who was admitted with back pain. Back pain is better, but still has some pain. She lives by herself and worries how to take care of herself at home due to her back pain     ROS:  (bold if positive, if negative)      Tolerating PT  Tolerating Diet        Objective:     Last 24hrs VS reviewed since prior progress note.  Most recent are:    Visit Vitals  /75 (BP 1 Location: Left arm, BP Patient Position: At rest)   Pulse (!) 56   Temp 98.4 °F (36.9 °C)   Resp 17   Ht 5' 3\" (1.6 m)   Wt 86.2 kg (190 lb)   SpO2 97%   BMI 33.66 kg/m²     SpO2 Readings from Last 6 Encounters:   19 97%   19 97%            Intake/Output Summary (Last 24 hours) at 2019 0838  Last data filed at 2019 0142  Gross per 24 hour   Intake 240 ml   Output --   Net 240 ml        Physical Exam:    Gen:  Well-developed, well-nourished, in no acute distress  HEENT:  Pink conjunctivae, PERRL, hearing intact to voice, moist mucous membranes  Neck:  Supple, without masses, thyroid non-tender  Resp:  No accessory muscle use, clear breath sounds without wheezes rales or rhonchi  Card:  No murmurs, normal S1, S2 without thrills, bruits or peripheral edema  Abd:  Soft, non-tender, non-distended, normoactive bowel sounds are present, no palpable organomegaly and no detectable hernias  Lymph:  No cervical or inguinal adenopathy  Musc:  No cyanosis or clubbing  Skin:  No rashes or ulcers, skin turgor is good  Neuro:  Cranial nerves are grossly intact, no focal motor weakness, follows commands appropriately  Psych:  Good insight, oriented to person, place and time, alert  __________________________________________________________________  Medications Reviewed: (see below)  Medications:     Current Facility-Administered Medications   Medication Dose Route Frequency    polyethylene glycol (MIRALAX) packet 17 g  17 g Oral DAILY    amLODIPine (NORVASC) tablet 10 mg  10 mg Oral DAILY    atorvastatin (LIPITOR) tablet 20 mg  20 mg Oral DAILY    cyclobenzaprine (FLEXERIL) tablet 10 mg  10 mg Oral TID PRN    methylPREDNISolone (MEDROL) tablet 4 mg  4 mg Oral TID    Followed by   Krystle Pride ON 2/27/2019] methylPREDNISolone (MEDROL) tablet 4 mg  4 mg Oral BID    Followed by   Krystle Pride ON 2/28/2019] methylPREDNISolone (MEDROL) tablet 4 mg  4 mg Oral ACB    sodium chloride (NS) flush 5-40 mL  5-40 mL IntraVENous Q8H    sodium chloride (NS) flush 5-40 mL  5-40 mL IntraVENous PRN    naloxone (NARCAN) injection 0.4 mg  0.4 mg IntraVENous PRN    0.9% sodium chloride infusion  50 mL/hr IntraVENous CONTINUOUS    acetaminophen (TYLENOL) tablet 650 mg  650 mg Oral Q4H PRN    HYDROcodone-acetaminophen (NORCO) 5-325 mg per tablet 1 Tab  1 Tab Oral Q4H PRN    ondansetron (ZOFRAN) injection 4 mg  4 mg IntraVENous Q4H PRN    docusate sodium (COLACE) capsule 100 mg  100 mg Oral BID    enoxaparin (LOVENOX) injection 40 mg  40 mg SubCUTAneous Q24H    dextrose (D50W) injection syrg 12.5-25 g  25-50 mL IntraVENous PRN    lidocaine 4 % patch 1 Patch  1 Patch TransDERmal Q24H    insulin glargine (LANTUS) injection 42 Units  42 Units SubCUTAneous QHS    And    insulin glargine (LANTUS) injection 42 Units  42 Units SubCUTAneous QHS    insulin lispro (HUMALOG) injection   SubCUTAneous AC&HS    glucose chewable tablet 16 g  4 Tab Oral PRN    dextrose (D50W) injection syrg 12.5-25 g  25-50 mL IntraVENous PRN    glucagon (GLUCAGEN) injection 1 mg  1 mg IntraMUSCular PRN    HYDROmorphone (PF) (DILAUDID) injection 1 mg  1 mg IntraVENous Q4H PRN    glipiZIDE (GLUCOTROL) tablet 5 mg  5 mg Oral BID WITH MEALS    colchicine (MITIGARE) capsule 0.6 mg  0.6 mg Oral DAILY PRN    losartan (COZAAR) tablet 100 mg  100 mg Oral DAILY    And    hydroCHLOROthiazide (HYDRODIURIL) tablet 12.5 mg  12.5 mg Oral DAILY        Lab Data Reviewed: (see below)  Lab Review:     No results for input(s): WBC, HGB, HCT, PLT, HGBEXT, HCTEXT, PLTEXT, HGBEXT, HCTEXT, PLTEXT in the last 72 hours. No results for input(s): NA, K, CL, CO2, GLU, BUN, CREA, CA, MG, PHOS, ALB, TBIL, TBILI, SGOT, ALT, INR in the last 72 hours. No lab exists for component: INREXT, INREXT  Lab Results   Component Value Date/Time    Glucose (POC) 101 (H) 02/26/2019 07:23 AM    Glucose (POC) 52 (L) 02/26/2019 06:44 AM    Glucose (POC) 223 (H) 02/25/2019 09:10 PM    Glucose (POC) 168 (H) 02/25/2019 04:57 PM    Glucose (POC) 153 (H) 02/25/2019 11:05 AM     No results for input(s): PH, PCO2, PO2, HCO3, FIO2 in the last 72 hours. No results for input(s): INR in the last 72 hours. No lab exists for component: Achilles Blizzard  All Micro Results     Procedure Component Value Units Date/Time    URINE CULTURE HOLD SAMPLE [[id number]] Collected:  02/22/19 8937    Order Status:  Completed Specimen:  Urine from Serum Updated:  02/22/19 0620     Urine culture hold       URINE ON HOLD IN MICROBIOLOGY DEPT FOR 3 DAYS. IF UNPRESERVED URINE IS SUBMITTED, IT CANNOT BE USED FOR ADDITIONAL TESTING AFTER 24 HRS, RECOLLECTION WILL BE REQUIRED. I have reviewed notes of prior 24hr. Other pertinent lab:       Total time spent with patient: 30 Minutes                  Care Plan discussed with: Patient, Nursing Staff and >50% of time spent in counseling and coordination of care    Discussed:  Care Plan    Prophylaxis:  Lovenox    Disposition:  Home w/Family           ___________________________________________________    Attending Physician: Zainab Marroquin MD

## 2019-02-27 VITALS
BODY MASS INDEX: 33.66 KG/M2 | WEIGHT: 190 LBS | DIASTOLIC BLOOD PRESSURE: 67 MMHG | TEMPERATURE: 99 F | HEART RATE: 65 BPM | SYSTOLIC BLOOD PRESSURE: 130 MMHG | RESPIRATION RATE: 17 BRPM | HEIGHT: 63 IN | OXYGEN SATURATION: 97 %

## 2019-03-28 ENCOUNTER — TELEPHONE (OUTPATIENT)
Dept: NEUROLOGY | Age: 74
End: 2019-03-28

## 2019-03-28 NOTE — TELEPHONE ENCOUNTER
Contacted patient to see what she needed. She states that she was admitted to the hospital which is the reason why she needed to reschedule different EMG appointments. She has rescheduled her appointment for 4/17/19 at 3:00pm. She stated that she reached out to the imaging center and they stated to her in order for her to do the EMG and MRI on the same day she would need a new order for an MRI. I stated to her that I will send the message to him and see if he will give her a new order. She also stated that she mentioned to  that she is nervous about the needles and was wondering if she could have something to calm her nerves. I stated to her that he has left the office for the day and I will give him the message. She verbalized understanding.

## 2019-03-29 ENCOUNTER — TELEPHONE (OUTPATIENT)
Dept: NEUROLOGY | Age: 74
End: 2019-03-29

## 2019-03-29 NOTE — TELEPHONE ENCOUNTER
She was given a Rx for Valium tablet at her initial visit, to take before she did the MRI and EMG. What happened to that prescription?

## 2019-03-29 NOTE — TELEPHONE ENCOUNTER
Left VM for patient to let her know that Khushi Valladares gave her an Rx for Valium on her initial office visit and we are now wondering what has happened to it. I stated for her to call back so we can discuss further.

## 2019-04-01 ENCOUNTER — TELEPHONE (OUTPATIENT)
Dept: NEUROLOGY | Age: 74
End: 2019-04-01

## 2019-04-02 ENCOUNTER — TELEPHONE (OUTPATIENT)
Dept: NEUROLOGY | Age: 74
End: 2019-04-02

## 2019-04-02 DIAGNOSIS — R20.0 NUMBNESS AND TINGLING IN BOTH HANDS: ICD-10-CM

## 2019-04-02 DIAGNOSIS — R29.898 WEAKNESS OF BOTH LEGS: Primary | ICD-10-CM

## 2019-04-02 DIAGNOSIS — R20.2 NUMBNESS AND TINGLING IN BOTH HANDS: ICD-10-CM

## 2019-04-02 DIAGNOSIS — R29.898 WEAKNESS OF BOTH HANDS: ICD-10-CM

## 2019-04-02 NOTE — TELEPHONE ENCOUNTER
Contacted Coordination of Care, spoke with April again. I stated to her that a new order for the MRI of the C-Spine in. She verbalized understanding and has scheduled the patient for 4/17/2019 at 4:15 at the [facility] center. I verbalized understanding with her.

## 2019-04-02 NOTE — TELEPHONE ENCOUNTER
Contacted Coordination of Care, spoke with April to schedule patients MRI of the Cervical Spine W&WO contrast. I stated to her that I needed it scheduled at the [place] location on 4/17/2019 around 4:15pm. She stated that she did not see any appts available at that time or day at the [place] location, she stated that she was going to give them a call to see if she can be put on for that day. I verbalized understanding with her. She stated that they will need a new order for the MRI. I verbalized understanding with April. I stated that I will send a message to  and ask him to put one in.

## 2019-04-02 NOTE — TELEPHONE ENCOUNTER
Contacted patient back, she stated that she does have the Rx for Valium but she was inquiring if she can take it before her EMG, I stated to her that would be fine and that I will call KARLI to get her MRI Scheduled at the [place] location right after her EMG. She verbalized understanding and I stated that I will call her once I get everything straightened up. She verbalized understanding.

## 2019-04-02 NOTE — TELEPHONE ENCOUNTER
Re-entered/ Changed MRI C-spine to without contrast only (pt has mild kidney disease based on last labs in early February).

## 2019-04-02 NOTE — TELEPHONE ENCOUNTER
Left VM for patient to let her know that we have put a new order for her MRI in and that she has been scheduled for 4/17/19 at 4:15 at the [facility] center. I stated for her to call back if needed.

## 2019-04-12 ENCOUNTER — TELEPHONE (OUTPATIENT)
Dept: NEUROLOGY | Age: 74
End: 2019-04-12

## 2019-04-12 NOTE — TELEPHONE ENCOUNTER
----- Message from Delia Hassan sent at 4/12/2019  2:48 PM EDT -----  Regarding: Dr Preethi Torrez would like to speak to the nurse regarding a EMG she has scheduled for 4-, please call the pt at [phone].

## 2019-04-15 NOTE — TELEPHONE ENCOUNTER
Contacted patient back, she wanted me to make a follow up appt for her after her EMG and MRI so she can get her results. I verbalized understanding with her and was able to make an appt for her 4/24/19 at 2:20pm. She verbalized understanding.

## 2019-04-17 ENCOUNTER — OFFICE VISIT (OUTPATIENT)
Dept: NEUROLOGY | Age: 74
End: 2019-04-17

## 2019-04-17 ENCOUNTER — HOSPITAL ENCOUNTER (OUTPATIENT)
Dept: MRI IMAGING | Age: 74
Discharge: HOME | End: 2019-04-17
Attending: PSYCHIATRY & NEUROLOGY
Payer: MEDICARE

## 2019-04-17 DIAGNOSIS — R29.898 WEAKNESS OF BOTH LEGS: ICD-10-CM

## 2019-04-17 DIAGNOSIS — G56.01 SEVERE CARPAL TUNNEL SYNDROME OF RIGHT WRIST: Primary | ICD-10-CM

## 2019-04-17 DIAGNOSIS — M54.12 LEFT CERVICAL RADICULOPATHY: ICD-10-CM

## 2019-04-17 DIAGNOSIS — R20.2 NUMBNESS AND TINGLING IN BOTH HANDS: ICD-10-CM

## 2019-04-17 DIAGNOSIS — R29.898 WEAKNESS OF BOTH HANDS: ICD-10-CM

## 2019-04-17 DIAGNOSIS — M54.12 RIGHT CERVICAL RADICULOPATHY: ICD-10-CM

## 2019-04-17 DIAGNOSIS — E11.42 DIABETIC SENSORIMOTOR NEUROPATHY (HCC): ICD-10-CM

## 2019-04-17 DIAGNOSIS — G56.01 CARPAL TUNNEL SYNDROME OF RIGHT WRIST: ICD-10-CM

## 2019-04-17 DIAGNOSIS — R20.0 NUMBNESS AND TINGLING IN BOTH HANDS: ICD-10-CM

## 2019-04-17 PROCEDURE — 72141 MRI NECK SPINE W/O DYE: CPT

## 2019-04-17 NOTE — PROCEDURES
EMG/ NCS Report  DRUG REHABILITATION  - DAY ONE RESIDENCE  [facility]  [facility], [address] [place], [address]   Ph: [phone]   FAX: [phone]/ [phone]  Test Date:  2019    Patient: Rupal Parada : 1945 Physician: Roni Munoz M.D. Sex: Female Height: ' \" Ref Phys: Roni Munoz M.D.   ID#: [id number] Weight:  lbs. Technician: Jose Puri     Patient History:  CC:CARSON HANDS WEAKNESS AND LOWER LEGS WEAKNESS    EMG & NCV Findings:  Evaluation of the right Fibular motor nerve showed normal distal onset latency (3.1 ms), normal amplitude (3.6 mV), normal conduction velocity (B Fib-Ankle, 41 m/s), and normal conduction velocity (Poplt-B Fib, 56 m/s). The left median motor nerve showed no response (Wrist) and no response (Elbow). The right median motor nerve showed prolonged distal onset latency (5.6 ms), normal amplitude (8.0 mV), and decreased conduction velocity (Elbow-Wrist, 38 m/s). The right tibial motor nerve showed normal distal onset latency (4.3 ms), normal amplitude (3.5 mV), and decreased conduction velocity (Knee-Ankle, 35 m/s). The left ulnar motor nerve showed normal distal onset latency (3.0 ms), reduced amplitude (5.6 mV), decreased conduction velocity (B Elbow-Wrist, 47 m/s), and decreased conduction velocity (A Elbow-B Elbow, 45 m/s). The right ulnar motor nerve showed normal distal onset latency (2.7 ms), normal amplitude (7.1 mV), decreased conduction velocity (B Elbow-Wrist, 45 m/s), and decreased conduction velocity (A Elbow-B Elbow, 48 m/s). The left median sensory and the right median sensory nerves showed no response (Wrist). The left radial sensory, the right radial sensory, and the right ulnar sensory nerves showed normal distal peak latency (L2.3, R2.3, R3.6 ms) and normal amplitude (L20.1, R25.6, R14.3 µV). The right Sup Fibular sensory nerve showed no response (Lower leg).   The right sural sensory nerve showed no response (Calf). The left ulnar sensory nerve showed normal distal peak latency (3.5 ms) and reduced amplitude (4.9 µV). Left vs. Right side comparison data for the ulnar sensory nerve indicates abnormal L-R amplitude difference (65.7 %). All F Wave latencies were within normal limits. All F Wave left vs. right side latency differences were within normal limits. Needle evaluation of the left abductor pollicis brevis muscle showed increased insertional activity, moderately increased spontaneous activity, and recruitment. The left first dorsal interosseous and the right deltoid muscles showed diminished recruitment. The left extensor indicis muscle showed diminished recruitment and increased motor unit amplitude. The right abductor pollicis brevis muscle showed increased motor unit amplitude and moderately increased polyphasic potentials. The right first dorsal interosseous muscle showed increased motor unit amplitude. The right triceps muscle showed diminished recruitment, increased motor unit amplitude, and moderately increased polyphasic potentials. All remaining muscles (as indicated in the following table) showed no evidence of electrical instability. 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 2) Severe carpal carpal tunnel (absent sensory and motor responses) on left. 3) Mild to moderate carpal tunnel on right.   4) Mild slow bilateral ulnar motor responses, without slowing of onset latencies or focal slowing around elbows, suggests this is due to peripheral neuropathy 5) Left C8 radiculopathy  6) Right C6 radiculopathy     Recommend correlation with imaging of cervical spine    ___________________________  Cleo Patterson M.D.      Nerve Conduction Studies  Anti Sensory Summary Table     Stim Site NR Peak (ms) Norm Peak (ms) P-T Amp (µV) Norm P-T Amp Site1 Site2 Dist (cm)   Left Median Anti Sensory (2nd Digit)  31.1°C   Wrist NR  <4 >13 Wrist 2nd Digit 14.0   Right Median Anti Sensory (2nd Digit)  30.2°C   Wrist NR  <4  >13 Wrist 2nd Digit 14.0   Left Radial Anti Sensory (Base 1st Digit)  30.8°C   Wrist    2.3 <2.8 20.1 >11 Wrist Base 1st Digit 10.0   Right Radial Anti Sensory (Base 1st Digit)  30.9°C   Wrist    2.3 <2.8 25.6 >11 Wrist Base 1st Digit 10.0   Right Sup Fibular Anti Sensory (Lat ankle)  32.9°C   Lower leg NR  <4.6  >4 Lower leg Lat ankle 10.0   Right Sural Anti Sensory (Lat Mall)  32.9°C   Calf NR  <4.5  >4.0 Calf Lat Mall 14.0   Left Ulnar Anti Sensory (5th Digit)  30.7°C   Wrist    3.5 <4.0 4.9 >9 Wrist 5th Digit 14.0   B Elbow    3.5  10.5  B Elbow Wrist 0.0   Right Ulnar Anti Sensory (5th Digit)  30.6°C   Wrist    3.6 <4.0 14.3 >9 Wrist 5th Digit 14.0   B Elbow    3.6  17.9  B Elbow Wrist 0.0     Motor Summary Table     Stim Site NR Onset (ms) Norm Onset (ms) O-P Amp (mV) Norm O-P Amp Amp (Prev) (%) Site1 Site2 Dist (cm) Tay (m/s) Norm Tay (m/s)   Right Fibular Motor (Ext Dig Brev)  33°C   Ankle    3.1 <6.5 3.6 >1.1 100.0 Ankle Ext Dig Brev 8.0     B Fib    10.5  2.7  75.0 B Fib Ankle 30.0 41 >38   Poplt    12.3  2.5  92.6 Poplt B Fib 10.0 56 >42   Left Median Motor (Abd Poll Brev)  31.2°C   Wrist NR  <4.5  >4.1  Wrist Abd Poll Brev 8.0     Elbow NR             Right Median Motor (Abd Poll Brev)  31°C   Wrist    5.6 <4.5 8.0 >4.1 100.0 Wrist Abd Poll Brev 8.0     Elbow    10.9  7.5  93.8 Elbow Wrist 20.0 38 >49   Right Tibial Motor (Abd Mcgowan Brev)  32.9°C   Ankle    4.3 <6.1 3.5 >1.1 100.0 Ankle Abd Mcgowan Brev 8.0     Knee    14.2  2.6  74.3 Knee Ankle 35.0 35 >39   Left Ulnar Motor (Abd Dig Minimi)  30.2°C   Wrist    3.0 <3.1 5.6 >7.0 100.0 Wrist Abd Dig Minimi 8.0  >50   B Elbow    7.3  5.0  89.3 B Elbow Wrist 20.0 47 >50   A Elbow    9.5  5.1  102.0 A Elbow B Elbow 10.0 45 >50   Right Ulnar Motor (Abd Dig Minimi)  28.8°C   Wrist    2.7 <3.1 7.1 >7.0 100.0 Wrist Abd Dig Minimi 8.0  >50   B Elbow    7.1  6.5  91.5 B Elbow Wrist 20.0 45 >50   A Elbow    9.2  6.3  96.9 A Elbow B Elbow 10.0 48 >50     F Wave Studies     NR F-Lat (ms) Lat Norm (ms) L-R F-Lat (ms) L-R Lat Norm   Right Tibial (Mrkrs) (Abd Hallucis)  32.9°C      54.07 <56  <5.7   Left Ulnar (Mrkrs) (Abd Dig Min)  30.2°C      31.02 <32 0.00 <2.5   Right Ulnar (Mrkrs) (Abd Dig Min)  28.5°C      31.02 <32 0.00 <2.5     H Reflex Studies     NR H-Lat (ms) L-R H-Lat (ms) L-R Lat Norm   Right Tibial (Gastroc)  32.8°C      34.91  <2.0     EMG     Side Muscle Nerve Root Ins Act Fibs Psw Recrt Duration Amp Poly Comment   Left Abd Poll Brev Median C8-T1 Incr Nml 2+ No MUAP Nml Nml Nml    Left 1stDorInt Ulnar C8-T1 Nml Nml Nml Reduced Nml Nml Nml mild reduced   Left ExtIndicis Radial (Post Int) C7-8 Nml Nml Nml Reduced Nml Incr Nml sig reduced   Left FlexDigProf Ulnar C8,T1 Nml Nml Nml Nml Nml Nml Nml incomplete relax   Left PronatorTeres Median C6-7 Nml Nml Nml Nml Nml Nml Nml    Left Triceps Radial C6-7-8 Nml Nml Nml Nml Nml Nml Nml    Left Deltoid Axillary C5-6 Nml Nml Nml Nml Nml Nml Nml    Left Lower Cerv Parasp Rami C7,T1 Nml Nml Nml Nml Nml Nml Nml    Right Abd Poll Brev Median C8-T1 Nml Nml Nml Nml Nml Incr 2+    Right 1stDorInt Ulnar C8-T1 Nml Nml Nml Nml Nml Incr Nml    Right ExtIndicis Radial (Post Int) C7-8 Nml Nml Nml Nml Nml Nml Nml    Right FlexDigProf Ulnar C8,T1 Nml Nml Nml Nml Nml Nml Nml    Right PronatorTeres Median C6-7 Nml Nml Nml Nml Nml Nml Nml    Right Triceps Radial C6-7-8 Nml Nml Nml Reduced Nml Incr 2+    Right Deltoid Axillary C5-6 Nml Nml Nml Reduced Nml Nml Nml    Right Lower Cerv Parasp Rami C7,T1 Nml Nml Nml Nml Nml Nml Nml        Waveforms:

## 2019-04-24 ENCOUNTER — OFFICE VISIT (OUTPATIENT)
Dept: NEUROLOGY | Age: 74
End: 2019-04-24

## 2019-04-24 VITALS
SYSTOLIC BLOOD PRESSURE: 110 MMHG | HEART RATE: 62 BPM | BODY MASS INDEX: 33.66 KG/M2 | RESPIRATION RATE: 20 BRPM | OXYGEN SATURATION: 96 % | HEIGHT: 63 IN | WEIGHT: 190 LBS | DIASTOLIC BLOOD PRESSURE: 78 MMHG

## 2019-04-24 DIAGNOSIS — M48.02 CERVICAL SPINAL STENOSIS: Primary | ICD-10-CM

## 2019-04-24 DIAGNOSIS — R29.898 WEAKNESS OF BOTH LEGS: ICD-10-CM

## 2019-04-24 DIAGNOSIS — G56.03 BILATERAL CARPAL TUNNEL SYNDROME: ICD-10-CM

## 2019-04-24 DIAGNOSIS — M54.12 LEFT CERVICAL RADICULOPATHY: ICD-10-CM

## 2019-04-24 DIAGNOSIS — G95.9 CERVICAL MYELOPATHY (HCC): ICD-10-CM

## 2019-04-24 DIAGNOSIS — M54.12 RIGHT CERVICAL RADICULOPATHY: ICD-10-CM

## 2019-04-24 NOTE — LETTER
4/24/2019 3:50 PM 
 
RE:    Corrina Barnes  
[address] [place] [address] I am referring my patient to you for evaluation of  
76 y.o. female with severe upper to mid cervical spinal stenosis with cord signal change at c3-4. Complaints of upper extremity pain and occasional leg weakness. Please evaluate for surgical options. . Please see her 
pertinent patient information below. Problem List:    
Patient Active Problem List  
 Diagnosis Date Noted  Back pain 02/22/2019  Debility 02/22/2019  CAD (coronary artery disease) 02/22/2019  
 HTN (hypertension) 02/22/2019  Gout 02/22/2019 Medical History:   No past medical history on file. Allergies:   No Known Allergies Medications:    
Current Outpatient Medications Medication Sig  cyclobenzaprine (FLEXERIL) 10 mg tablet Take 1 Tab by mouth three (3) times daily as needed for Muscle Spasm(s).  metFORMIN (GLUCOPHAGE) 1,000 mg tablet Take 1,000 mg by mouth two (2) times a day.  losartan-hydroCHLOROthiazide (HYZAAR) 100-12.5 mg per tablet Take 1 Tab by mouth daily.  gabapentin (NEURONTIN) 100 mg capsule Take 100 mg by mouth two (2) times daily as needed.  carvedilol (COREG) 12.5 mg tablet Take 12.5 mg by mouth two (2) times a day.  amLODIPine (NORVASC) 10 mg tablet Take 10 mg by mouth daily.  atorvastatin (LIPITOR) 20 mg tablet Take 20 mg by mouth daily.  colchicine 0.6 mg tablet Take 0.6 mg by mouth as needed.  LANTUS SOLOSTAR U-100 INSULIN 100 unit/mL (3 mL) inpn 84 Units by SubCUTAneous route daily.  indomethacin (INDOCIN) 50 mg capsule Take 50 mg by mouth as needed.  glipiZIDE (GLUCOTROL) 5 mg tablet Take 5 mg by mouth two (2) times a day. No current facility-administered medications for this visit. Surgical History:   No past surgical history on file. Social History:    
Social History Socioeconomic History  Marital status:  Spouse name: Not on file  Number of children: Not on file  Years of education: Not on file  Highest education level: Not on file I appreciate your assistance in Ms. Stephen's care  and look forward to your findings and recommendations. Sincerely, Becky Parra MD

## 2019-04-24 NOTE — PROGRESS NOTES
Interval HPI:   This is a 76 y.o. female who is following up for     Chief Complaint   Patient presents with    Results     EMG,MRI Results       Patient following up to go over EMG, MRI C-spine results. We spent the entirety of her visit today reviewing the EMG report (multiple findings) and MRI C-spine report and images (significant degenerative changes and spinal stenosis as noted below). She continues to describe burning tingling in hands (left more than right). She also says at times her feet/ toes feel heavy like there are weighs on them. She has seen her PCP recently and say her Gabapentin was increased but she hasn't picked up the new Rx yet. 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 2) Severe carpal carpal tunnel (absent sensory and motor responses) on left. 3) Mild to moderate carpal tunnel on right. 4) Mild slow bilateral ulnar motor responses, without slowing of onset latencies or focal slowing around elbows, suggests this is due to peripheral neuropathy 5) Left C8 radiculopathy  6) Right C6 radiculopathy     MRI C-spine: 19 IMPRESSION: Multilevel extensive disc and facet degenerative change. Severe canal and bilateral foraminal stenoses at C3-4 and C4-5 with moderate cord compression. Mild increased cord signal intensity at C3-4 and C4-5 as well. Moderate canal stenosis and mild cord compression at C5-C6. Moderate to severe canal stenosis at C7-T1. Other less severe canal and foraminal stenoses as described above.       Brief ROS: as above or otherwise negative        No Known Allergies  Current Outpatient Medications   Medication Sig Dispense Refill    cyclobenzaprine (FLEXERIL) 10 mg tablet Take 1 Tab by mouth three (3) times daily as needed for Muscle Spasm(s). 30 Tab 0    metFORMIN (GLUCOPHAGE) 1,000 mg tablet Take 1,000 mg by mouth two (2) times a day.       losartan-hydroCHLOROthiazide (HYZAAR) 100-12.5 mg per tablet Take 1 Tab by mouth daily.  gabapentin (NEURONTIN) 100 mg capsule Take 100 mg by mouth two (2) times daily as needed.  carvedilol (COREG) 12.5 mg tablet Take 12.5 mg by mouth two (2) times a day.  amLODIPine (NORVASC) 10 mg tablet Take 10 mg by mouth daily.  atorvastatin (LIPITOR) 20 mg tablet Take 20 mg by mouth daily.  colchicine 0.6 mg tablet Take 0.6 mg by mouth as needed.  LANTUS SOLOSTAR U-100 INSULIN 100 unit/mL (3 mL) inpn 84 Units by SubCUTAneous route daily.  indomethacin (INDOCIN) 50 mg capsule Take 50 mg by mouth as needed.  glipiZIDE (GLUCOTROL) 5 mg tablet Take 5 mg by mouth two (2) times a day. Physical Exam  Blood pressure 110/78, pulse 62, resp. rate 20, height 5' 3\" (1.6 m), weight 86.2 kg (190 lb), SpO2 96 %. No exam performed at this visit   The entirety of the visit was spent reviewing test results in detail and answering questions  In general, she was awake,alert, resting in chair, conversant      Impression      ICD-10-CM ICD-9-CM    1. Cervical spinal stenosis M48.02 723.0 REFERRAL TO NEUROSURGERY   2. Cervical myelopathy (HCC) G95.9 721.1 REFERRAL TO NEUROSURGERY   3. Bilateral carpal tunnel syndrome G56.03 354.0 REFERRAL TO ORTHOPEDICS   4. Right cervical radiculopathy M54.12 723.4 REFERRAL TO NEUROSURGERY   5. Left cervical radiculopathy M54.12 723.4 REFERRAL TO NEUROSURGERY   6. Weakness of both legs R29.898 729.89 REFERRAL TO NEUROSURGERY       1) Cervical spinal stenosis (severe at C3-4, C4-5, with spinal cord signal change at C3-4), multi-level disc bulge, bilateral cervical radiculopathy (left C8, right C6). Patient reported intermittent leg weakness at her initial visit. Explained to her that this is due to the severe cervical spinal stenosis and may get worse with time as the stenosis gradually worsens.   D/w her that I'm referring her to Neurosurgery/ Spine Surgery to discuss surgical options to prevent worsening/ progression of leg weakness. 2) Bilateral cervical radiculopathy  Per Neurosurgery/ Spine Surgery    3) Severe CTS on left, mild-moderate CTS on right  Pt requested referral to Dr Kaitlynn Leos. JE[facility]/ [facility] for evaluation. Entered that referral order.     4) Mild diabetic peripheral neuropathy  Gabapentin recently increased by PCP    5) Follow up with Neurology if needed    Signed By: Denzel Soto MD     April 24, 2019

## 2019-04-24 NOTE — PROGRESS NOTES
Patient is here for MRI and EMG results,     She states that she has been okay for the most part, but her hands constantly bother her.

## 2019-04-30 ENCOUNTER — TELEPHONE (OUTPATIENT)
Dept: NEUROLOGY | Age: 74
End: 2019-04-30

## 2019-04-30 NOTE — TELEPHONE ENCOUNTER
----- Message from [employer] sent at 4/30/2019 12:36 PM EDT -----  Regarding: Dr. Solange Cifuentes  Pt is calling to speak with the nurse regarding a conversation they had last week about a neurosurgeon. She has not heard anything yet. [phone].

## 2019-04-30 NOTE — TELEPHONE ENCOUNTER
Contacted patient back to let her know that I did send off her referrals and that call backs for appts do not happen right away, I stated to her that give  office a few days to call her back. She verbalized understanding.

## 2019-05-08 ENCOUNTER — TELEPHONE (OUTPATIENT)
Dept: NEUROLOGY | Age: 74
End: 2019-05-08

## 2019-05-08 NOTE — TELEPHONE ENCOUNTER
----- Message from Lydia Briggs sent at 5/8/2019 10:58 AM EDT -----  Regarding: Dr Blas Dolan (p) [phone],[facility] like to speak to the nurse regarding a CT scan her Neuro Surgeon  is requesting, she said she had one already and wants to speak with the nurse to make sure the Surgeon is aware before she gets another one done again. Would like a return call back as soon as possible.

## 2019-05-09 NOTE — TELEPHONE ENCOUNTER
Left VM for Kalli Amos (Assistant to [address] [place] [address]) to let her know that [facility] Montemayor did not order a CT of the C-Spine for the patient, it was done at the hospital and since our office did not order it we are not able to send it, so they would have to get in contact with the hospital and retreive it from them. I asked for her to call back if needed.

## 2019-05-09 NOTE — TELEPHONE ENCOUNTER
Lynn Rogel calling from Dr Rika Chowdary office in ref to faxing back the CT of cervical spine ASAP  Best # [phone]

## 2019-05-10 ENCOUNTER — TELEPHONE (OUTPATIENT)
Dept: NEUROLOGY | Age: 74
End: 2019-05-10

## 2019-05-10 DIAGNOSIS — G95.9 CERVICAL MYELOPATHY (HCC): ICD-10-CM

## 2019-05-10 DIAGNOSIS — M48.02 CERVICAL SPINAL STENOSIS: Primary | ICD-10-CM

## 2019-05-10 NOTE — TELEPHONE ENCOUNTER
Contacted patient to let her know that we spoke with Keron Dotson ([id number]) and Sandra Quintero is requiring her to get a CT of the C-Spine before she can be scheduled for an appt. I stated to her that  put in an order for her to get a CT of the C-Spine and KARLI should be calling her within 24-48 hours to get her scheduled. She verbalized understanding.

## 2019-05-17 ENCOUNTER — HOSPITAL ENCOUNTER (OUTPATIENT)
Dept: CT IMAGING | Age: 74
Discharge: HOME | End: 2019-05-17
Attending: PSYCHIATRY & NEUROLOGY
Payer: MEDICARE

## 2019-05-17 DIAGNOSIS — M48.02 CERVICAL SPINAL STENOSIS: ICD-10-CM

## 2019-05-17 DIAGNOSIS — G95.9 CERVICAL MYELOPATHY (HCC): ICD-10-CM

## 2019-05-17 PROCEDURE — 72125 CT NECK SPINE W/O DYE: CPT

## 2019-06-10 ENCOUNTER — HOSPITAL ENCOUNTER (OUTPATIENT)
Dept: PREADMISSION TESTING | Age: 74
Discharge: HOME | End: 2019-06-10
Payer: MEDICARE

## 2019-06-10 VITALS
BODY MASS INDEX: 31.95 KG/M2 | SYSTOLIC BLOOD PRESSURE: 146 MMHG | HEIGHT: 63 IN | RESPIRATION RATE: 24 BRPM | DIASTOLIC BLOOD PRESSURE: 67 MMHG | TEMPERATURE: 99.6 F | WEIGHT: 180.3 LBS | HEART RATE: 66 BPM | OXYGEN SATURATION: 97 %

## 2019-06-10 LAB
ANION GAP SERPL CALC-SCNC: 5 MMOL/L (ref 5–15)
ATRIAL RATE: 62 BPM
BASOPHILS # BLD: 0 K/UL (ref 0–0.1)
BASOPHILS NFR BLD: 0 % (ref 0–1)
BUN SERPL-MCNC: 15 MG/DL (ref 6–20)
BUN/CREAT SERPL: 16 (ref 12–20)
CALCIUM SERPL-MCNC: 9.4 MG/DL (ref 8.5–10.1)
CALCULATED P AXIS, ECG09: 68 DEGREES
CALCULATED R AXIS, ECG10: -50 DEGREES
CALCULATED T AXIS, ECG11: 3 DEGREES
CHLORIDE SERPL-SCNC: 104 MMOL/L (ref 97–108)
CO2 SERPL-SCNC: 31 MMOL/L (ref 21–32)
CREAT SERPL-MCNC: 0.96 MG/DL (ref 0.55–1.02)
DIAGNOSIS, 93000: NORMAL
DIFFERENTIAL METHOD BLD: ABNORMAL
EOSINOPHIL # BLD: 0.4 K/UL (ref 0–0.4)
EOSINOPHIL NFR BLD: 7 % (ref 0–7)
ERYTHROCYTE [DISTWIDTH] IN BLOOD BY AUTOMATED COUNT: 13.5 % (ref 11.5–14.5)
GLUCOSE SERPL-MCNC: 125 MG/DL (ref 65–100)
HCT VFR BLD AUTO: 35.1 % (ref 35–47)
HGB BLD-MCNC: 11.1 G/DL (ref 11.5–16)
IMM GRANULOCYTES # BLD AUTO: 0 K/UL (ref 0–0.04)
IMM GRANULOCYTES NFR BLD AUTO: 0 % (ref 0–0.5)
LYMPHOCYTES # BLD: 3.3 K/UL (ref 0.8–3.5)
LYMPHOCYTES NFR BLD: 50 % (ref 12–49)
MCH RBC QN AUTO: 26.5 PG (ref 26–34)
MCHC RBC AUTO-ENTMCNC: 31.6 G/DL (ref 30–36.5)
MCV RBC AUTO: 83.8 FL (ref 80–99)
MONOCYTES # BLD: 0.7 K/UL (ref 0–1)
MONOCYTES NFR BLD: 12 % (ref 5–13)
NEUTS SEG # BLD: 2 K/UL (ref 1.8–8)
NEUTS SEG NFR BLD: 31 % (ref 32–75)
NRBC # BLD: 0 K/UL (ref 0–0.01)
NRBC BLD-RTO: 0 PER 100 WBC
P-R INTERVAL, ECG05: 160 MS
PLATELET # BLD AUTO: 214 K/UL (ref 150–400)
PMV BLD AUTO: 10 FL (ref 8.9–12.9)
POTASSIUM SERPL-SCNC: 4.2 MMOL/L (ref 3.5–5.1)
Q-T INTERVAL, ECG07: 420 MS
QRS DURATION, ECG06: 156 MS
QTC CALCULATION (BEZET), ECG08: 426 MS
RBC # BLD AUTO: 4.19 M/UL (ref 3.8–5.2)
SODIUM SERPL-SCNC: 140 MMOL/L (ref 136–145)
VENTRICULAR RATE, ECG03: 62 BPM
WBC # BLD AUTO: 6.4 K/UL (ref 3.6–11)

## 2019-06-10 PROCEDURE — 36415 COLL VENOUS BLD VENIPUNCTURE: CPT

## 2019-06-10 PROCEDURE — 80048 BASIC METABOLIC PNL TOTAL CA: CPT

## 2019-06-10 PROCEDURE — 93005 ELECTROCARDIOGRAM TRACING: CPT

## 2019-06-10 PROCEDURE — 85025 COMPLETE CBC W/AUTO DIFF WBC: CPT

## 2019-06-10 RX ORDER — ALPRAZOLAM 0.25 MG/1
0.25 TABLET ORAL
COMMUNITY

## 2019-06-10 RX ORDER — BISMUTH SUBSALICYLATE 262 MG
1 TABLET,CHEWABLE ORAL DAILY
COMMUNITY

## 2019-06-10 RX ORDER — ASPIRIN 325 MG
325 TABLET, DELAYED RELEASE (ENTERIC COATED) ORAL DAILY
COMMUNITY

## 2019-06-10 NOTE — PERIOP NOTES
[address] [address] [facility]                            MAIN OR                                  [phone]   MAIN PRE OP                          [phone]                                                                                AMBULATORY PRE OP          [phone]  PRE-ADMISSION TESTING    [phone]     Surgery Date:   Thursday 6/20/19         Is surgery arrival time given by surgeon? NO  If NO, [address] [facility] staff will call you between 3 and 7pm the day before your surgery with your arrival time. (If your surgery is on a Monday, we will call you the Friday before.)    Call [phone] after 7pm Monday-Friday if you did not receive your arrival time.     INSTRUCTIONS BEFORE YOUR SURGERY   When You  Arrive   Arrive at the [address] [facility] on the day of your surgery  Have your insurance card, photo ID, and any copayment (if needed)     Food   and   Drink   NO food or drink after midnight the night before surgery    This means NO water, gum, mints, coffee, juice, etc.  No alcohol (beer, wine, liquor) 24 hours before and after surgery     Medications to   TAKE   Morning of Surgery   MEDICATIONS TO TAKE THE MORNING OF SURGERY WITH A SIP OF WATER:    Xanax if needed, carvedilol, gabapentin if needed, amlodipine     Medications  To  STOP      7 days before surgery    Non-Steroidal anti-inflammatory Drugs (NSAID's): for example, Ibuprofen (Advil, Motrin), Naproxen (Aleve), indomethacin     Herbal supplements, vitamins, and fish oil   Other:  (Pain medications not listed above, including Tylenol may be taken)   Blood  Thinners    If you take  Aspirin, Plavix, Coumadin, or any blood-thinning or anti-blood clot medicine, talk to the doctor who prescribed the medications for pre-operative instructions - Aspirin as directed by Dr. Timo Hitchcock  If you shower the morning of surgery, please do not apply anything to your skin (lotions, powders, deodorant, or makeup, especially mascara)      Do not shave or trim anywhere 24 hours before surgery   Wear your hair loose or down; no pony-tails, buns, or metal hair clips   Wear loose, comfortable, clean clothes   Wear glasses instead of contacts   Leave money, valuables, and jewelry, including body piercings, at home     Going Home       or Spending the Night    SAME-DAY SURGERY: You must have a responsible adult drive you home and stay with you 24 hours after surgery   ADMITS: If your doctor is keeping you into the hospital after surgery, leave personal belongings/luggage in your car until you have a hospital room number. Hospital discharge time is 12 noon  Drivers must be here before 12 noon unless you are told differently   Special Instructions   Free  parking 7am-5pm, high protein snack before bedtime, glucose tabs if needed, bring CPAP day of surgery       Follow all instructions so your surgery wont be cancelled. Please, be on time. If a situation occurs and you are delayed the day of surgery, call [phone] or          [phone]. If your physical condition changes (like a fever, cold, flu, etc.) call your surgeon. The patient was contacted  in person. Home medication reviewed and verified during PAT appointment. The patient verbalizes understanding of all instructions and does not  need reinforcement.

## 2019-06-10 NOTE — H&P (VIEW-ONLY)
PAT Pre-Op History & Physical 
 
Patient: Theron Villela                  MRN: [id number]          SSN: [id number]            YOB: 1945 Chief Complaint: 
 Pre-Op Exam - Left hand pain HPI:  
Patient is a 76 y.o.  female  who presents with history of left hand pain for over a year. She notes the pain has gotten sever recently. She notes burning and constant pain in her hand. The pain does not radiate. She notes her hand is pretty weak. Nothing makes it better or worse. She is RHD. She has mild numbness to her hand. She states she went to her PCP yesterday for her stiff neck and is taking medications and wearing hot compresses. The patient was evaluated in the surgeon's office and it was determined that the most appropriate plan of care is to proceed with surgical intervention. Patient's PCP Nelson Crawford MD 
 
 
Past Medical History:  
Diagnosis Date  Anxiety  Diabetes ([facility] [place] [address]  Gout  H/O sleep apnea CPAP  
 Heart attack ([facility] [place] [address] 1999  High cholesterol  Hypertension  Left anterior fascicular block  Obesity, Class I, BMI 30-34.9  Right bundle branch block (RBBB) determined by electrocardiography Past Surgical History:  
Procedure Laterality Date  HX CATARACT REMOVAL Bilateral   
 HX CORONARY ARTERY BYPASS GRAFT N/A 20 years ago 3 way  HX HYSTERECTOMY N/A Prior to Admission medications Medication Sig Start Date End Date Taking? Authorizing Provider  
multivitamin (ONE A DAY) tablet Take 1 Tab by mouth daily. Yes Provider, Historical  
aspirin delayed-release (ECOTRIN) 325 mg tablet Take 325 mg by mouth daily. Yes Provider, Historical  
ALPRAZolam (XANAX) 0.25 mg tablet Take 0.25 mg by mouth daily as needed for Anxiety.    Yes Provider, Historical  
cyclobenzaprine (FLEXERIL) 10 mg tablet Take 1 Tab by mouth three (3) times daily as needed for Muscle Spasm(s). 2/26/19  Yes Francois Odell MD  
 metFORMIN (GLUCOPHAGE) 1,000 mg tablet Take 1,000 mg by mouth two (2) times a day. Yes Provider, Historical  
losartan-hydroCHLOROthiazide (HYZAAR) 100-12.5 mg per tablet Take 1 Tab by mouth daily. Yes Provider, Historical  
gabapentin (NEURONTIN) 300 mg capsule Take 300 mg by mouth three (3) times daily as needed. Yes Provider, Historical  
carvedilol (COREG) 12.5 mg tablet Take 12.5 mg by mouth two (2) times a day. 12/7/18  Yes Provider, Historical  
amLODIPine (NORVASC) 10 mg tablet Take 10 mg by mouth every morning. 1/29/19  Yes Provider, Historical  
atorvastatin (LIPITOR) 20 mg tablet Take 20 mg by mouth nightly. 12/31/18  Yes Provider, Historical  
colchicine 0.6 mg tablet Take 0.6 mg by mouth as needed. 12/13/18  Yes Provider, Historical  
LANTUS SOLOSTAR U-100 INSULIN 100 unit/mL (3 mL) inpn 50 Units by SubCUTAneous route every morning. 12/12/18  Yes Provider, Historical  
indomethacin (INDOCIN) 50 mg capsule Take 50 mg by mouth three (3) times daily as needed. 1/29/19  Yes Provider, Historical  
 
 
No Known Allergies Social History Tobacco Use  Smoking status: Never Smoker  Smokeless tobacco: Never Used Substance Use Topics  Alcohol use: Never Frequency: Never Social History Substance and Sexual Activity Drug Use Never Family History Problem Relation Age of Onset  Cancer Mother  Diabetes Father  Heart Disease Father Review of Systems: 
 
Constitutional: Negative for chills and fever HENT: Negative for congestion and sore throat Eyes: negative for blurred vision and double vision Mouth: Positive for upper dentures Respiratory: Negative for cough, shortness of breath and wheezing Cardiovascular: Negative for chest pain and palpitations Gastrointestinal: Negative for abdominal pain, constipation, diarrhea and nausea Genitourinary: Negative for dysuria and hematuria Musculoskeletal: Positive for left hand pain Skin: Negative for rash, open wounds Neurological: Negative for dizziness, tremors and headaches Psychiatric: Negative for depression. The patient is not nervous/anxious. Objective:  
 
Patient Vitals for the past 24 hrs: 
 Temp Pulse Resp BP SpO2  
06/10/19 1304 99.6 °F (37.6 °C) 66 24 146/67 97 % Temp (24hrs), Av.6 °F (37.6 °C), Min:99.6 °F (37.6 °C), Max:99.6 °F (37.6 °C) Body mass index is 31.94 kg/m². Wt Readings from Last 1 Encounters:  
06/10/19 81.8 kg (180 lb 4.8 oz) Physical Exam: 
 
 General: Pleasant,  cooperative, no apparent distress, appears stated age. Eyes: Conjunctivae/corneas clear. EOMs intact. Nose: Nares normal. 
 Mouth/Throat: Lips, mucosa, and tongue normal. Teeth and gums normal. 
 Lungs: Clear to auscultation bilaterally. Heart: Regular rate and rhythm, S1, S2 normal. No murmur, click, rub or gallop. Abdomen: Soft, non-tender. Bowel sounds normal. No distention. Musculoskeletal:   in right hand weak. Extremities:  Extremities normal, atraumatic, no cyanosis or edema. Calves 
                               supple, non tender to palpation. Pulses: 2+ and symmetric bilateral upper extremities. Cap. refill <2 seconds Skin: Skin color, texture, turgor normal. No visible open areas, examined fully clothed Neurologic: CN II-XII grossly intact. Alert and oriented x3. Labs:  
Recent Results (from the past 72 hour(s)) METABOLIC PANEL, BASIC Collection Time: 06/10/19  1:57 PM  
Result Value Ref Range Sodium 140 136 - 145 mmol/L Potassium 4.2 3.5 - 5.1 mmol/L Chloride 104 97 - 108 mmol/L  
 CO2 31 21 - 32 mmol/L Anion gap 5 5 - 15 mmol/L Glucose 125 (H) 65 - 100 mg/dL BUN 15 6 - 20 MG/DL Creatinine 0.96 0.55 - 1.02 MG/DL  
 BUN/Creatinine ratio 16 12 - 20 GFR est AA >60 >60 ml/min/1.73m2 GFR est non-AA 57 (L) >60 ml/min/1.73m2  Calcium 9.4 8.5 - 10.1 MG/DL  
CBC WITH AUTOMATED DIFF  
 Collection Time: 06/10/19  1:57 PM  
Result Value Ref Range WBC 6.4 3.6 - 11.0 K/uL  
 RBC 4.19 3.80 - 5.20 M/uL  
 HGB 11.1 (L) 11.5 - 16.0 g/dL HCT 35.1 35.0 - 47.0 % MCV 83.8 80.0 - 99.0 FL  
 MCH 26.5 26.0 - 34.0 PG  
 MCHC 31.6 30.0 - 36.5 g/dL  
 RDW 13.5 11.5 - 14.5 % PLATELET 648 374 - 915 K/uL MPV 10.0 8.9 - 12.9 FL  
 NRBC 0.0 0  WBC ABSOLUTE NRBC 0.00 0.00 - 0.01 K/uL NEUTROPHILS 31 (L) 32 - 75 % LYMPHOCYTES 50 (H) 12 - 49 % MONOCYTES 12 5 - 13 % EOSINOPHILS 7 0 - 7 % BASOPHILS 0 0 - 1 % IMMATURE GRANULOCYTES 0 0.0 - 0.5 % ABS. NEUTROPHILS 2.0 1.8 - 8.0 K/UL  
 ABS. LYMPHOCYTES 3.3 0.8 - 3.5 K/UL  
 ABS. MONOCYTES 0.7 0.0 - 1.0 K/UL  
 ABS. EOSINOPHILS 0.4 0.0 - 0.4 K/UL  
 ABS. BASOPHILS 0.0 0.0 - 0.1 K/UL  
 ABS. IMM. GRANS. 0.0 0.00 - 0.04 K/UL  
 DF AUTOMATED    
EKG, 12 LEAD, INITIAL Collection Time: 06/10/19  2:17 PM  
Result Value Ref Range Ventricular Rate 62 BPM  
 Atrial Rate 62 BPM  
 P-R Interval 160 ms QRS Duration 156 ms  
 Q-T Interval 420 ms QTC Calculation (Bezet) 426 ms Calculated P Axis 68 degrees Calculated R Axis -50 degrees Calculated T Axis 3 degrees Diagnosis Normal sinus rhythm Right bundle branch block Left anterior fascicular block ** Bifascicular block ** Moderate voltage criteria for LVH, may be normal variant Abnormal ECG Confirmed by Ernesot Ritchie MD., Angel Lorenzana ([id number]) on 6/10/2019 2:36:13 PM 
  
 
 
Assessment and Plan 1. Left Carpal Tunnel Syndrome 2. Pre-op general physical exam 
 
Scheduled for Left Carpal Tunnel Release Labs and EKG reviewed.   
 
Keyona Rodriguez NP

## 2019-06-10 NOTE — H&P
PAT Pre-Op History & Physical    Patient: Blake Arevalo                  MRN: [id number]          SSN: [id number]            YOB: 1945                  Chief Complaint:   Pre-Op Exam - Left hand pain          HPI:   Patient is a 76 y.o.  female  who presents with history of left hand pain for over a year. She notes the pain has gotten sever recently. She notes burning and constant pain in her hand. The pain does not radiate. She notes her hand is pretty weak. Nothing makes it better or worse. She is RHD. She has mild numbness to her hand. She states she went to her PCP yesterday for her stiff neck and is taking medications and wearing hot compresses. The patient was evaluated in the surgeon's office and it was determined that the most appropriate plan of care is to proceed with surgical intervention. Patient's PCP Geannie Babinski, MD      Past Medical History:   Diagnosis Date    Anxiety     Diabetes ([facility] [address]     Gout     H/O sleep apnea     CPAP    Heart attack ([facility] [address] 1999    High cholesterol     Hypertension     Left anterior fascicular block     Obesity, Class I, BMI 30-34.9     Right bundle branch block (RBBB) determined by electrocardiography       Past Surgical History:   Procedure Laterality Date    HX CATARACT REMOVAL Bilateral     HX CORONARY ARTERY BYPASS GRAFT N/A 20 years ago    3 way    HX HYSTERECTOMY N/A       Prior to Admission medications    Medication Sig Start Date End Date Taking? Authorizing Provider   multivitamin (ONE A DAY) tablet Take 1 Tab by mouth daily. Yes Provider, Historical   aspirin delayed-release (ECOTRIN) 325 mg tablet Take 325 mg by mouth daily. Yes Provider, Historical   ALPRAZolam (XANAX) 0.25 mg tablet Take 0.25 mg by mouth daily as needed for Anxiety.    Yes Provider, Historical   cyclobenzaprine (FLEXERIL) 10 mg tablet Take 1 Tab by mouth three (3) times daily as needed for Muscle Spasm(s). 2/26/19  Yes Jay Sal MD   metFORMIN (GLUCOPHAGE) 1,000 mg tablet Take 1,000 mg by mouth two (2) times a day. Yes Provider, Historical   losartan-hydroCHLOROthiazide (HYZAAR) 100-12.5 mg per tablet Take 1 Tab by mouth daily. Yes Provider, Historical   gabapentin (NEURONTIN) 300 mg capsule Take 300 mg by mouth three (3) times daily as needed. Yes Provider, Historical   carvedilol (COREG) 12.5 mg tablet Take 12.5 mg by mouth two (2) times a day. 12/7/18  Yes Provider, Historical   amLODIPine (NORVASC) 10 mg tablet Take 10 mg by mouth every morning. 1/29/19  Yes Provider, Historical   atorvastatin (LIPITOR) 20 mg tablet Take 20 mg by mouth nightly. 12/31/18  Yes Provider, Historical   colchicine 0.6 mg tablet Take 0.6 mg by mouth as needed. 12/13/18  Yes Provider, Historical   LANTUS SOLOSTAR U-100 INSULIN 100 unit/mL (3 mL) inpn 50 Units by SubCUTAneous route every morning. 12/12/18  Yes Provider, Historical   indomethacin (INDOCIN) 50 mg capsule Take 50 mg by mouth three (3) times daily as needed.  1/29/19  Yes Provider, Historical       No Known Allergies     Social History     Tobacco Use    Smoking status: Never Smoker    Smokeless tobacco: Never Used   Substance Use Topics    Alcohol use: Never     Frequency: Never      Social History     Substance and Sexual Activity   Drug Use Never     Family History   Problem Relation Age of Onset    Cancer Mother     Diabetes Father     Heart Disease Father        Review of Systems:    Constitutional: Negative for chills and fever  HENT: Negative for congestion and sore throat  Eyes: negative for blurred vision and double vision  Mouth: Positive for upper dentures  Respiratory: Negative for cough, shortness of breath and wheezing  Cardiovascular: Negative for chest pain and palpitations  Gastrointestinal: Negative for abdominal pain, constipation, diarrhea and nausea  Genitourinary: Negative for dysuria and hematuria  Musculoskeletal: Positive for left hand pain  Skin: Negative for rash, open wounds  Neurological: Negative for dizziness, tremors and headaches  Psychiatric: Negative for depression. The patient is not nervous/anxious. Objective:     Patient Vitals for the past 24 hrs:   Temp Pulse Resp BP SpO2   06/10/19 1304 99.6 °F (37.6 °C) 66 24 146/67 97 %     Temp (24hrs), Av.6 °F (37.6 °C), Min:99.6 °F (37.6 °C), Max:99.6 °F (37.6 °C)    Body mass index is 31.94 kg/m². Wt Readings from Last 1 Encounters:   06/10/19 81.8 kg (180 lb 4.8 oz)        Physical Exam:     General: Pleasant,  cooperative, no apparent distress, appears stated age. Eyes: Conjunctivae/corneas clear. EOMs intact. Nose: Nares normal.   Mouth/Throat: Lips, mucosa, and tongue normal. Teeth and gums normal.   Lungs: Clear to auscultation bilaterally. Heart: Regular rate and rhythm, S1, S2 normal. No murmur, click, rub or gallop. Abdomen: Soft, non-tender. Bowel sounds normal. No distention. Musculoskeletal:   in right hand weak. Extremities:  Extremities normal, atraumatic, no cyanosis or edema. Calves                                 supple, non tender to palpation. Pulses: 2+ and symmetric bilateral upper extremities. Cap. refill <2 seconds   Skin: Skin color, texture, turgor normal. No visible open areas, examined fully clothed   Neurologic: CN II-XII grossly intact. Alert and oriented x3.     Labs:   Recent Results (from the past 72 hour(s))   METABOLIC PANEL, BASIC    Collection Time: 06/10/19  1:57 PM   Result Value Ref Range    Sodium 140 136 - 145 mmol/L    Potassium 4.2 3.5 - 5.1 mmol/L    Chloride 104 97 - 108 mmol/L    CO2 31 21 - 32 mmol/L    Anion gap 5 5 - 15 mmol/L    Glucose 125 (H) 65 - 100 mg/dL    BUN 15 6 - 20 MG/DL    Creatinine 0.96 0.55 - 1.02 MG/DL    BUN/Creatinine ratio 16 12 - 20      GFR est AA >60 >60 ml/min/1.73m2    GFR est non-AA 57 (L) >60 ml/min/1.73m2    Calcium 9.4 8.5 - 10.1 MG/DL   CBC WITH AUTOMATED DIFF    Collection Time: 06/10/19  1:57 PM   Result Value Ref Range WBC 6.4 3.6 - 11.0 K/uL    RBC 4.19 3.80 - 5.20 M/uL    HGB 11.1 (L) 11.5 - 16.0 g/dL    HCT 35.1 35.0 - 47.0 %    MCV 83.8 80.0 - 99.0 FL    MCH 26.5 26.0 - 34.0 PG    MCHC 31.6 30.0 - 36.5 g/dL    RDW 13.5 11.5 - 14.5 %    PLATELET 132 406 - 495 K/uL    MPV 10.0 8.9 - 12.9 FL    NRBC 0.0 0  WBC    ABSOLUTE NRBC 0.00 0.00 - 0.01 K/uL    NEUTROPHILS 31 (L) 32 - 75 %    LYMPHOCYTES 50 (H) 12 - 49 %    MONOCYTES 12 5 - 13 %    EOSINOPHILS 7 0 - 7 %    BASOPHILS 0 0 - 1 %    IMMATURE GRANULOCYTES 0 0.0 - 0.5 %    ABS. NEUTROPHILS 2.0 1.8 - 8.0 K/UL    ABS. LYMPHOCYTES 3.3 0.8 - 3.5 K/UL    ABS. MONOCYTES 0.7 0.0 - 1.0 K/UL    ABS. EOSINOPHILS 0.4 0.0 - 0.4 K/UL    ABS. BASOPHILS 0.0 0.0 - 0.1 K/UL    ABS. IMM. GRANS. 0.0 0.00 - 0.04 K/UL    DF AUTOMATED     EKG, 12 LEAD, INITIAL    Collection Time: 06/10/19  2:17 PM   Result Value Ref Range    Ventricular Rate 62 BPM    Atrial Rate 62 BPM    P-R Interval 160 ms    QRS Duration 156 ms    Q-T Interval 420 ms    QTC Calculation (Bezet) 426 ms    Calculated P Axis 68 degrees    Calculated R Axis -50 degrees    Calculated T Axis 3 degrees    Diagnosis       Normal sinus rhythm  Right bundle branch block  Left anterior fascicular block  ** Bifascicular block **  Moderate voltage criteria for LVH, may be normal variant  Abnormal ECG  Confirmed by Danna Rubio MD., Lodema Schwab ([id number]) on 6/10/2019 2:36:13 PM         Assessment and Plan     1. Left Carpal Tunnel Syndrome  2. Pre-op general physical exam    Scheduled for Left Carpal Tunnel Release    Labs and EKG reviewed.      Frances Slade NP

## 2019-06-12 NOTE — PERIOP NOTES
Patient returned call, instructed on md's request to stop Ecotrin 7 days before surgery.  Reviewed other medications at her request.

## 2019-06-19 ENCOUNTER — ANESTHESIA EVENT (OUTPATIENT)
Dept: SURGERY | Age: 74
End: 2019-06-19
Payer: MEDICARE

## 2019-06-20 ENCOUNTER — ANESTHESIA (OUTPATIENT)
Dept: SURGERY | Age: 74
End: 2019-06-20
Payer: MEDICARE

## 2019-06-20 ENCOUNTER — HOSPITAL ENCOUNTER (OUTPATIENT)
Age: 74
Setting detail: OUTPATIENT SURGERY
Discharge: HOME | End: 2019-06-20
Attending: NEUROLOGICAL SURGERY | Admitting: NEUROLOGICAL SURGERY
Payer: MEDICARE

## 2019-06-20 VITALS
DIASTOLIC BLOOD PRESSURE: 62 MMHG | OXYGEN SATURATION: 98 % | TEMPERATURE: 97.4 F | HEIGHT: 63 IN | SYSTOLIC BLOOD PRESSURE: 144 MMHG | RESPIRATION RATE: 15 BRPM | BODY MASS INDEX: 31.8 KG/M2 | HEART RATE: 65 BPM | WEIGHT: 179.45 LBS

## 2019-06-20 DIAGNOSIS — M54.50 LOW BACK PAIN WITHOUT SCIATICA, UNSPECIFIED BACK PAIN LATERALITY, UNSPECIFIED CHRONICITY: Primary | ICD-10-CM

## 2019-06-20 LAB
GLUCOSE BLD STRIP.AUTO-MCNC: 112 MG/DL (ref 65–100)
GLUCOSE BLD STRIP.AUTO-MCNC: 91 MG/DL (ref 65–100)
SERVICE CMNT-IMP: ABNORMAL
SERVICE CMNT-IMP: NORMAL

## 2019-06-20 PROCEDURE — 74011000250 HC RX REV CODE- 250: Performed by: NEUROLOGICAL SURGERY

## 2019-06-20 PROCEDURE — 76060000032 HC ANESTHESIA 0.5 TO 1 HR: Performed by: NEUROLOGICAL SURGERY

## 2019-06-20 PROCEDURE — A4565 SLINGS: HCPCS

## 2019-06-20 PROCEDURE — 82962 GLUCOSE BLOOD TEST: CPT

## 2019-06-20 PROCEDURE — 74011250636 HC RX REV CODE- 250/636

## 2019-06-20 PROCEDURE — 76010000138 HC OR TIME 0.5 TO 1 HR: Performed by: NEUROLOGICAL SURGERY

## 2019-06-20 PROCEDURE — 77030010777 HC CRDL FT DERY -B: Performed by: NEUROLOGICAL SURGERY

## 2019-06-20 PROCEDURE — 77030032490 HC SLV COMPR SCD KNE COVD -B

## 2019-06-20 PROCEDURE — 74011250636 HC RX REV CODE- 250/636: Performed by: ANESTHESIOLOGY

## 2019-06-20 PROCEDURE — 74011250636 HC RX REV CODE- 250/636: Performed by: NEUROLOGICAL SURGERY

## 2019-06-20 PROCEDURE — 74011250637 HC RX REV CODE- 250/637: Performed by: NEUROLOGICAL SURGERY

## 2019-06-20 PROCEDURE — 77030020782 HC GWN BAIR PAWS FLX 3M -B

## 2019-06-20 PROCEDURE — 74011000272 HC RX REV CODE- 272: Performed by: NEUROLOGICAL SURGERY

## 2019-06-20 PROCEDURE — 77030018836 HC SOL IRR NACL ICUM -A: Performed by: NEUROLOGICAL SURGERY

## 2019-06-20 PROCEDURE — 77030040356 HC CORD BPLR FRCP COVD -A: Performed by: NEUROLOGICAL SURGERY

## 2019-06-20 PROCEDURE — 76210000024 HC REC RM PH II 2.5 TO 3 HR: Performed by: NEUROLOGICAL SURGERY

## 2019-06-20 PROCEDURE — 77030002916 HC SUT ETHLN J&J -A: Performed by: NEUROLOGICAL SURGERY

## 2019-06-20 PROCEDURE — 77030000032 HC CUF TRNQT ZIMM -B: Performed by: NEUROLOGICAL SURGERY

## 2019-06-20 RX ORDER — NALOXONE HYDROCHLORIDE 0.4 MG/ML
0.2 INJECTION, SOLUTION INTRAMUSCULAR; INTRAVENOUS; SUBCUTANEOUS
Status: DISCONTINUED | OUTPATIENT
Start: 2019-06-20 | End: 2019-06-20 | Stop reason: HOSPADM

## 2019-06-20 RX ORDER — HYDROMORPHONE HYDROCHLORIDE 1 MG/ML
.25-1 INJECTION, SOLUTION INTRAMUSCULAR; INTRAVENOUS; SUBCUTANEOUS
Status: DISCONTINUED | OUTPATIENT
Start: 2019-06-20 | End: 2019-06-20 | Stop reason: HOSPADM

## 2019-06-20 RX ORDER — BACITRACIN ZINC 500 UNIT/G
OINTMENT (GRAM) TOPICAL AS NEEDED
Status: DISCONTINUED | OUTPATIENT
Start: 2019-06-20 | End: 2019-06-20 | Stop reason: HOSPADM

## 2019-06-20 RX ORDER — FENTANYL CITRATE 50 UG/ML
INJECTION, SOLUTION INTRAMUSCULAR; INTRAVENOUS AS NEEDED
Status: DISCONTINUED | OUTPATIENT
Start: 2019-06-20 | End: 2019-06-20 | Stop reason: HOSPADM

## 2019-06-20 RX ORDER — SODIUM CHLORIDE, SODIUM LACTATE, POTASSIUM CHLORIDE, CALCIUM CHLORIDE 600; 310; 30; 20 MG/100ML; MG/100ML; MG/100ML; MG/100ML
125 INJECTION, SOLUTION INTRAVENOUS CONTINUOUS
Status: DISCONTINUED | OUTPATIENT
Start: 2019-06-20 | End: 2019-06-20 | Stop reason: HOSPADM

## 2019-06-20 RX ORDER — DIPHENHYDRAMINE HYDROCHLORIDE 50 MG/ML
12.5 INJECTION, SOLUTION INTRAMUSCULAR; INTRAVENOUS AS NEEDED
Status: DISCONTINUED | OUTPATIENT
Start: 2019-06-20 | End: 2019-06-20 | Stop reason: HOSPADM

## 2019-06-20 RX ORDER — LIDOCAINE HYDROCHLORIDE 10 MG/ML
0.1 INJECTION, SOLUTION EPIDURAL; INFILTRATION; INTRACAUDAL; PERINEURAL AS NEEDED
Status: DISCONTINUED | OUTPATIENT
Start: 2019-06-20 | End: 2019-06-20 | Stop reason: HOSPADM

## 2019-06-20 RX ORDER — MIDAZOLAM HYDROCHLORIDE 1 MG/ML
INJECTION, SOLUTION INTRAMUSCULAR; INTRAVENOUS AS NEEDED
Status: DISCONTINUED | OUTPATIENT
Start: 2019-06-20 | End: 2019-06-20 | Stop reason: HOSPADM

## 2019-06-20 RX ORDER — PROPOFOL 10 MG/ML
INJECTION, EMULSION INTRAVENOUS AS NEEDED
Status: DISCONTINUED | OUTPATIENT
Start: 2019-06-20 | End: 2019-06-20 | Stop reason: HOSPADM

## 2019-06-20 RX ORDER — CEFAZOLIN SODIUM/WATER 2 G/20 ML
2 SYRINGE (ML) INTRAVENOUS
Status: COMPLETED | OUTPATIENT
Start: 2019-06-20 | End: 2019-06-20

## 2019-06-20 RX ORDER — HYDROCODONE BITARTRATE AND ACETAMINOPHEN 5; 325 MG/1; MG/1
1 TABLET ORAL
Qty: 20 TAB | Refills: 0 | Status: SHIPPED | OUTPATIENT
Start: 2019-06-20 | End: 2019-06-25

## 2019-06-20 RX ORDER — FLUMAZENIL 0.1 MG/ML
0.2 INJECTION INTRAVENOUS
Status: DISCONTINUED | OUTPATIENT
Start: 2019-06-20 | End: 2019-06-20 | Stop reason: HOSPADM

## 2019-06-20 RX ORDER — PROPOFOL 10 MG/ML
INJECTION, EMULSION INTRAVENOUS
Status: DISCONTINUED | OUTPATIENT
Start: 2019-06-20 | End: 2019-06-20 | Stop reason: HOSPADM

## 2019-06-20 RX ORDER — HYDROCODONE BITARTRATE AND ACETAMINOPHEN 5; 325 MG/1; MG/1
1 TABLET ORAL
Status: COMPLETED | OUTPATIENT
Start: 2019-06-20 | End: 2019-06-20

## 2019-06-20 RX ADMIN — MIDAZOLAM HYDROCHLORIDE 1 MG: 1 INJECTION, SOLUTION INTRAMUSCULAR; INTRAVENOUS at 07:40

## 2019-06-20 RX ADMIN — MIDAZOLAM HYDROCHLORIDE 2 MG: 1 INJECTION, SOLUTION INTRAMUSCULAR; INTRAVENOUS at 07:28

## 2019-06-20 RX ADMIN — FENTANYL CITRATE 50 MCG: 50 INJECTION, SOLUTION INTRAMUSCULAR; INTRAVENOUS at 07:28

## 2019-06-20 RX ADMIN — Medication 2 G: at 07:22

## 2019-06-20 RX ADMIN — PROPOFOL 50 MCG/KG/MIN: 10 INJECTION, EMULSION INTRAVENOUS at 07:34

## 2019-06-20 RX ADMIN — HYDROCODONE BITARTRATE AND ACETAMINOPHEN 1 TABLET: 5; 325 TABLET ORAL at 09:43

## 2019-06-20 RX ADMIN — PROPOFOL 30 MG: 10 INJECTION, EMULSION INTRAVENOUS at 07:45

## 2019-06-20 RX ADMIN — SODIUM CHLORIDE, POTASSIUM CHLORIDE, SODIUM LACTATE AND CALCIUM CHLORIDE: 600; 310; 30; 20 INJECTION, SOLUTION INTRAVENOUS at 07:28

## 2019-06-20 NOTE — ANESTHESIA POSTPROCEDURE EVALUATION
Procedure(s):  LEFT CARPAL TUNNEL RELEASE (MAC WITH LOCAL ANESTHESIA). MAC    Anesthesia Post Evaluation      Multimodal analgesia: multimodal analgesia not used between 6 hours prior to anesthesia start to PACU discharge  Patient location during evaluation: PACU  Patient participation: complete - patient participated  Level of consciousness: awake  Pain management: adequate  Airway patency: patent  Anesthetic complications: no  Cardiovascular status: acceptable, blood pressure returned to baseline and hemodynamically stable  Respiratory status: acceptable  Hydration status: acceptable  Post anesthesia nausea and vomiting:  controlled      Vitals Value Taken Time   /57 6/20/2019  8:20 AM   Temp     Pulse 59 6/20/2019  8:26 AM   Resp 12 6/20/2019  8:26 AM   SpO2 100 % 6/20/2019  8:26 AM   Vitals shown include unvalidated device data.

## 2019-06-20 NOTE — OP NOTES
[facility]  OPERATIVE REPORT    Name:  Leanne Healy  MR#:  [id number]  :  1945  ACCOUNT #:  [de-identified]  DATE OF SERVICE:  2019    PREOPERATIVE DIAGNOSIS:  Left carpal tunnel syndrome. POSTOPERATIVE DIAGNOSIS:  Left carpal tunnel syndrome. PROCEDURE PERFORMED:  Left carpal tunnel release. SURGEON:  Dollie Aschoff, MD PhD    ASSISTANT:  None. ANESTHESIA:  Local plus MAC. COMPLICATIONS:  None. ESTIMATED BLOOD LOSS: 5cc    FINDINGS:  Thickened transverse carpal ligament. INDICATIONS:  The patient is an 66-year-old female with multiple issues including cervical stenosis and mild radiculopathy but also with left carpal tunnel pain which bothering her about treating her other issues but at this point, she was not ready to proceed with that but given the fact that her left hand was the most bothersome area, she did wish to have this treated. She has had appropriate conservative management including failed bracing and positive electrodiagnostic studies. Informed consent was obtained from the patient after explaining the risks, benefits, and alternatives. The risks would include, but were not limited to, bleeding, infection, nerve injury, persisting pain, numbness, weakness, tingling, paresthesias, and lack of or incomplete benefit. Prior to the procedure, the patient received prophylactic antibiotics and had bilateral lower extremity SCDs placed. Antibiotics will be discontinued within 24 hours and the SCDs will be continued while she is nonambulatory. PROCEDURE:  The patient was positively identified in the preoperative holding area and brought to the operating theater. After successful induction of sedation, her left arm was prepped and draped in the usual sterile fashion. Tourniquet was applied and raised to 200. Incision was marked out in the interthenar crease and infiltrated with local anesthetic without epinephrine.   Small incision was made and carried down sharply to the transverse carpal ligament. Self-retaining retractor was placed. The ligament was opened with the scalpel and dissected free and cut with the carpal knife both proximally and distally. There was some thickening that was broken through with good release. The [place] 4 could be passed along the nerve proximally and distally into the forearm and hand respectively without obvious residual pressure. Tourniquet was taken down. Hemostasis was obtained and the incision was closed with a running 4-0 nylon suture and covered with bacitracin and sterile dressing. The patient was transferred to the [facility]. Sedation was stopped. She was taken to the recovery room in stable condition. At the end of the procedure, all counts were correct.       Alexi Moon MD PhD      [id number]  D:  06/20/2019 8:15  T:  06/20/2019 17:35  JOB #:  [id number]

## 2019-06-20 NOTE — BRIEF OP NOTE
BRIEF OPERATIVE NOTE    Date of Procedure: 6/20/2019   Preoperative Diagnosis: LEFT CARPAL TUNNEL SYNDROME  Postoperative Diagnosis: LEFT CARPAL TUNNEL SYNDROME    Procedure(s):  LEFT CARPAL TUNNEL RELEASE (MAC WITH LOCAL ANESTHESIA)  Surgeon(s) and Role:     * Adriana Neil MD - Primary         Surgical Assistant: none    Surgical Staff:  Circ-1: Ye Cooper RN  Circ-Intern: Olla Halsted, RN  Scrub Tech-1: Angie Bautista  Surg Asst-1: Doc Reyes  Event Time In Time Out   Incision Start 3626    Incision Close 0756      Anesthesia: MAC   Estimated Blood Loss: 1cc  Specimens: * No specimens in log *   Findings: thick tcl   Complications: none  Implants: * No implants in log *

## 2019-06-20 NOTE — ANESTHESIA PREPROCEDURE EVALUATION
Relevant Problems   No relevant active problems       Anesthetic History   No history of anesthetic complications            Review of Systems / Medical History  Patient summary reviewed, nursing notes reviewed and pertinent labs reviewed    Pulmonary  Within defined limits                 Neuro/Psych   Within defined limits           Cardiovascular    Hypertension          Past MI, CAD and CABG    Exercise tolerance: >4 METS  Comments: RBBB   GI/Hepatic/Renal  Within defined limits              Endo/Other  Within defined limits  Diabetes    Obesity     Other Findings   Comments: gout           Physical Exam    Airway  Mallampati: II    Neck ROM: normal range of motion   Mouth opening: Normal     Cardiovascular  Regular rate and rhythm,  S1 and S2 normal,  no murmur, click, rub, or gallop  Rhythm: regular  Rate: normal         Dental  No notable dental hx       Pulmonary  Breath sounds clear to auscultation               Abdominal  GI exam deferred       Other Findings            Anesthetic Plan    ASA: 2  Anesthesia type: MAC          Induction: Intravenous  Anesthetic plan and risks discussed with: Patient

## 2019-06-20 NOTE — INTERVAL H&P NOTE
H&P Update: 
Silvia Duarte was seen and examined. History and physical has been reviewed. The patient has been examined.  There have been no significant clinical changes since the completion of the originally dated History and Physical. 
 
 

No

## 2020-08-10 VITALS
OXYGEN SATURATION: 97 % | TEMPERATURE: 98.2 F | SYSTOLIC BLOOD PRESSURE: 134 MMHG | HEIGHT: 62 IN | HEART RATE: 68 BPM | WEIGHT: 171.8 LBS | BODY MASS INDEX: 31.62 KG/M2 | DIASTOLIC BLOOD PRESSURE: 63 MMHG

## 2020-08-11 ENCOUNTER — OFFICE VISIT (OUTPATIENT)
Dept: PODIATRY | Age: 75
End: 2020-08-11
Payer: COMMERCIAL

## 2020-08-11 VITALS
WEIGHT: 174 LBS | DIASTOLIC BLOOD PRESSURE: 69 MMHG | TEMPERATURE: 98.2 F | BODY MASS INDEX: 32.02 KG/M2 | HEART RATE: 62 BPM | SYSTOLIC BLOOD PRESSURE: 134 MMHG | HEIGHT: 62 IN | OXYGEN SATURATION: 98 %

## 2020-08-11 VITALS
OXYGEN SATURATION: 97 % | BODY MASS INDEX: 31.62 KG/M2 | DIASTOLIC BLOOD PRESSURE: 63 MMHG | TEMPERATURE: 98.2 F | SYSTOLIC BLOOD PRESSURE: 134 MMHG | HEIGHT: 62 IN | WEIGHT: 171.8 LBS | HEART RATE: 68 BPM

## 2020-08-11 DIAGNOSIS — E11.42 DIABETIC POLYNEUROPATHY ASSOCIATED WITH TYPE 2 DIABETES MELLITUS (HCC): Primary | ICD-10-CM

## 2020-08-11 DIAGNOSIS — L60.3 ONYCHODYSTROPHY: ICD-10-CM

## 2020-08-11 DIAGNOSIS — L60.0 INGROWN TOENAIL OF BOTH FEET: ICD-10-CM

## 2020-08-11 PROCEDURE — 11721 DEBRIDE NAIL 6 OR MORE: CPT | Performed by: PODIATRIST

## 2020-08-11 PROCEDURE — 99213 OFFICE O/P EST LOW 20 MIN: CPT | Performed by: PODIATRIST

## 2020-08-11 RX ORDER — TELMISARTAN 80 MG/1
80 TABLET ORAL DAILY
COMMUNITY

## 2020-08-20 PROBLEM — L60.3 ONYCHODYSTROPHY: Status: ACTIVE | Noted: 2020-08-20

## 2020-08-20 PROBLEM — L60.0 INGROWN TOENAIL OF BOTH FEET: Status: ACTIVE | Noted: 2020-08-20

## 2020-08-20 NOTE — PROGRESS NOTES
HISTORY OF PRESENT ILLNESS  Adeel Lopez is a 76 y.o. female is being seen in the office needing a diabetic foot exam and complaining of acute pain to the nail folds of B/L halluces. Patient denies any history of trauma. She states she was previously prescribed a cream, which she has been applying with minor symptomatic relief. Patient states that the pain is dull and rises to the level of 1 out of 10. Patient states the pain is worse in close toed shoes. Patient states she has a diabetic and is aware of her higher risk of infection. She states she has not presented for her initial consult with the endocrinologist that was given last office visit due to the COVID-19 pandemic. Patient states she has been unable to follow-up with the endocrinologist that was she was referred to last office visit. Patient denies any local/systemic signs of infection. Patient denies any other pedal complaints    Review of Systems   Constitutional: Negative. HENT: Negative. Eyes: Negative. Respiratory: Negative. Cardiovascular: Positive for leg swelling. Gastrointestinal: Negative. Genitourinary: Negative. Musculoskeletal: Positive for back pain, joint pain and myalgias. Skin: Negative. Neurological: Positive for sensory change. Endo/Heme/Allergies: Negative. Psychiatric/Behavioral: Positive for depression. The patient is nervous/anxious. All other systems reviewed and are negative.     Past Medical History:   Diagnosis Date    Anxiety     Carpal tunnel syndrome, bilateral     Diabetes ([facility] [address]     Gout     H/O sleep apnea     CPAP    Heart attack ([facility] [address] 1999    Heart disease     High cholesterol     Hypertension     Left anterior fascicular block     Myocardial infarction (HCC)     Obesity, Class I, BMI 30-34.9     Right bundle branch block (RBBB) determined by electrocardiography      Family History   Problem Relation Age of Onset    Cancer Mother     Diabetes Father     Heart Disease Father      Social History     Socioeconomic History    Marital status:      Spouse name: Not on file    Number of children: Not on file    Years of education: Not on file    Highest education level: Not on file   Occupational History    Not on file   Social Needs    Financial resource strain: Not on file    Food insecurity     Worry: Not on file     Inability: Not on file    Transportation needs     Medical: Not on file     Non-medical: Not on file   Tobacco Use    Smoking status: Never Smoker    Smokeless tobacco: Never Used   Substance and Sexual Activity    Alcohol use: Never     Frequency: Never    Drug use: Never    Sexual activity: Not on file   Lifestyle    Physical activity     Days per week: Not on file     Minutes per session: Not on file    Stress: Not on file   Relationships    Social connections     Talks on phone: Not on file     Gets together: Not on file     Attends Cheondoism service: Not on file     Active member of club or organization: Not on file     Attends meetings of clubs or organizations: Not on file     Relationship status: Not on file    Intimate partner violence     Fear of current or ex partner: Not on file     Emotionally abused: Not on file     Physically abused: Not on file     Forced sexual activity: Not on file   Other Topics Concern    Not on file   Social History Narrative    Not on file     Past Surgical History:   Procedure Laterality Date    HX CARPAL TUNNEL RELEASE      HX CATARACT REMOVAL Bilateral     HX CORONARY ARTERY BYPASS GRAFT N/A 20 years ago    [address]    HX HYSTERECTOMY N/A     HX LITHOTRIPSY      cholelithotomy     Current Outpatient Medications   Medication Instructions    ALPRAZolam (XANAX) 0.25 mg, Oral, DAILY AS NEEDED    amLODIPine (NORVASC) 10 mg, Oral, 7AM    aspirin delayed-release (ECOTRIN) 325 mg, Oral, DAILY    atorvastatin (LIPITOR) 20 mg, Oral, EVERY BEDTIME    carvediloL (COREG) 12.5 mg, Oral, 2 TIMES DAILY    colchicine 0.6 mg, Oral, AS NEEDED    cyclobenzaprine (FLEXERIL) 10 mg, Oral, 3 TIMES DAILY AS NEEDED    gabapentin (NEURONTIN) 300 mg, Oral, 3 TIMES DAILY AS NEEDED    indomethacin (INDOCIN) 50 mg, Oral, 3 TIMES DAILY AS NEEDED    Lantus Solostar U-100 Insulin 50 Units, SubCUTAneous, 7AM    losartan-hydroCHLOROthiazide (HYZAAR) 100-12.5 mg per tablet 1 Tab, Oral, DAILY    metFORMIN (GLUCOPHAGE) 1,000 mg, Oral, 2 TIMES DAILY    multivitamin (ONE A DAY) tablet 1 Tab, Oral, DAILY    telmisartan (MICARDIS) 80 mg, Oral, DAILY     No Known Allergies    Visit Vitals  /69 (BP 1 Location: Right arm, BP Patient Position: Sitting)   Pulse 62   Temp 98.2 °F (36.8 °C) (Temporal)   Ht 5' 2\" (1.575 m)   Wt 174 lb (78.9 kg)   SpO2 98%   BMI 31.83 kg/m²     Physical Exam  Vitals signs reviewed. Constitutional:       Appearance: She is obese. HENT:      Head:      Comments: Normal dentition  Cardiovascular:      Pulses:           Dorsalis pedis pulses are 2+ on the right side and 2+ on the left side. Posterior tibial pulses are 2+ on the right side and 2+ on the left side. Pulmonary:      Effort: Pulmonary effort is normal.   Musculoskeletal:      Right lower leg: Edema present. Left lower leg: Edema present. Right foot: Normal range of motion. Deformity present. No bunion or Charcot foot. Left foot: Normal range of motion. Deformity present. No bunion or Charcot foot. Feet:      Right foot:      Protective Sensation: 10 sites tested. 10 sites sensed. Skin integrity: Skin integrity normal.      Toenail Condition: Right toenails are abnormally thick. Left foot:      Protective Sensation: 10 sites tested. 10 sites sensed. Skin integrity: Skin integrity normal.      Toenail Condition: Left toenails are abnormally thick and ingrown. Skin:     General: Skin is warm. Capillary Refill: Capillary refill takes 2 to 3 seconds.    Neurological:      Mental Status: She is alert and oriented to person, place, and time. Psychiatric:         Mood and Affect: Affect normal. Mood is anxious. Behavior: Behavior is cooperative. ASSESSMENT and PLAN    ICD-10-CM ICD-9-CM    1. Diabetic polyneuropathy associated with type 2 diabetes mellitus (HCC)  E11.42 250.60      357.2    2. Ingrown toenail of both feet  L60.0 703.0    3. Onychodystrophy  L60.3 703.8        Discussed and educated patient regarding her current medical condition  Slant back procedures were performed to all offending nail borders without incident. Patient tolerated well and no dressing was needed. Instructed patient that she may need more invasive procedures in the future if the condition continues  A sharper, aseptic toenail plate debridement was performed to all 10 pedal digits with a nail nipper without incident. Patient tolerated well and no dressing was needed  Encouraged follow-up for initial consultation with endocrinologist that she was previously referred to from this office  Instructed patient to monitor their feet daily, be compliant with all medications and wear supportive shoe gear.

## 2020-10-28 ENCOUNTER — OFFICE VISIT (OUTPATIENT)
Dept: PODIATRY | Age: 75
End: 2020-10-28
Payer: MEDICARE

## 2020-10-28 VITALS
DIASTOLIC BLOOD PRESSURE: 62 MMHG | BODY MASS INDEX: 30.83 KG/M2 | HEIGHT: 63 IN | TEMPERATURE: 97.8 F | WEIGHT: 174 LBS | SYSTOLIC BLOOD PRESSURE: 112 MMHG

## 2020-10-28 DIAGNOSIS — M1A.0790 CHRONIC GOUT OF FOOT, UNSPECIFIED CAUSE, UNSPECIFIED LATERALITY: Primary | ICD-10-CM

## 2020-10-28 PROCEDURE — 99213 OFFICE O/P EST LOW 20 MIN: CPT | Performed by: PODIATRIST

## 2020-10-30 NOTE — PROGRESS NOTES
HISTORY OF PRESENT ILLNESS  Vick Ayala is a 76 y.o. female is being seen in the office needing a diabetic foot exam and complaining of acute pain to her bilateral big toe joints. Patient states her diabetes has been under control as of late. She is admitting to pain rising level of 4 out of 10 to her big toe joints. She states she has a history of gout but her uric acid has not been checked lately. She denies any trauma to the area. She denies any changes in shoe gear or increase in activity. She states weightbearing exacerbates the pain. She denies any breaks in the skin. She denies any local/systemic signs infection. She denies any other pedal complaints    Review of Systems   Constitutional: Negative. HENT: Negative. Eyes: Negative. Respiratory: Negative. Cardiovascular: Positive for leg swelling. Gastrointestinal: Negative. Genitourinary: Negative. Musculoskeletal: Positive for back pain, joint pain and myalgias. Skin: Negative. Neurological: Positive for sensory change. Endo/Heme/Allergies: Negative. Psychiatric/Behavioral: Positive for depression. The patient is nervous/anxious. All other systems reviewed and are negative.     Past Medical History:   Diagnosis Date    Anxiety     Carpal tunnel syndrome, bilateral     [facility] [address]     Gout     H/O sleep apnea     CPAP    Heart attack ([facility] [address] 1999    Heart disease     High cholesterol     Hypertension     Left anterior fascicular block     Myocardial infarction (HCC)     Obesity, Class I, BMI 30-34.9     Right bundle branch block (RBBB) determined by electrocardiography      Family History   Problem Relation Age of Onset    Cancer Mother     Diabetes Father     Heart Disease Father      Social History     Socioeconomic History    Marital status:      Spouse name: Not on file    Number of children: Not on file    Years of education: Not on file    Highest education level: Not on file Occupational History    Not on file   Social Needs    Financial resource strain: Not on file    Food insecurity     Worry: Not on file     Inability: Not on file    Transportation needs     Medical: Not on file     Non-medical: Not on file   Tobacco Use    Smoking status: Never Smoker    Smokeless tobacco: Never Used   Substance and Sexual Activity    Alcohol use: Never     Frequency: Never    Drug use: Never    Sexual activity: Not on file   Lifestyle    Physical activity     Days per week: Not on file     Minutes per session: Not on file    Stress: Not on file   Relationships    Social connections     Talks on phone: Not on file     Gets together: Not on file     Attends Protestant service: Not on file     Active member of club or organization: Not on file     Attends meetings of clubs or organizations: Not on file     Relationship status: Not on file    Intimate partner violence     Fear of current or ex partner: Not on file     Emotionally abused: Not on file     Physically abused: Not on file     Forced sexual activity: Not on file   Other Topics Concern    Not on file   Social History Narrative    Not on file     Past Surgical History:   Procedure Laterality Date    HX CARPAL TUNNEL RELEASE      HX CATARACT REMOVAL Bilateral     HX CORONARY ARTERY BYPASS GRAFT N/A 20 years ago    [address]    HX HYSTERECTOMY N/A     HX LITHOTRIPSY      cholelithotomy     Current Outpatient Medications   Medication Instructions    ALPRAZolam (XANAX) 0.25 mg, Oral, DAILY AS NEEDED    amLODIPine (NORVASC) 10 mg, Oral, 7AM    aspirin delayed-release (ECOTRIN) 325 mg, Oral, DAILY    atorvastatin (LIPITOR) 20 mg, Oral, EVERY BEDTIME    carvediloL (COREG) 12.5 mg, Oral, 2 TIMES DAILY    colchicine 0.6 mg, Oral, AS NEEDED    cyclobenzaprine (FLEXERIL) 10 mg, Oral, 3 TIMES DAILY AS NEEDED    gabapentin (NEURONTIN) 300 mg, Oral, 3 TIMES DAILY AS NEEDED    indomethacin (INDOCIN) 50 mg, Oral, 3 TIMES DAILY AS NEEDED    Lantus Solostar U-100 Insulin 50 Units, SubCUTAneous, 7AM    losartan-hydroCHLOROthiazide (HYZAAR) 100-12.5 mg per tablet 1 Tab, Oral, DAILY    metFORMIN (GLUCOPHAGE) 1,000 mg, Oral, 2 TIMES DAILY    multivitamin (ONE A DAY) tablet 1 Tab, Oral, DAILY    telmisartan (MICARDIS) 80 mg, Oral, DAILY     No Known Allergies    Visit Vitals  /62 (BP 1 Location: Left arm, BP Patient Position: Sitting)   Temp 97.8 °F (36.6 °C) (Temporal)   Ht 5' 3\" (1.6 m)   Wt 174 lb (78.9 kg)   BMI 30.82 kg/m²     Physical Exam  Vitals signs reviewed. Constitutional:       Appearance: She is obese. HENT:      Head:      Comments: Normal dentition  Cardiovascular:      Pulses:           Dorsalis pedis pulses are 2+ on the right side and 2+ on the left side. Posterior tibial pulses are 2+ on the right side and 2+ on the left side. Pulmonary:      Effort: Pulmonary effort is normal.   Musculoskeletal:      Right lower leg: Edema present. Left lower leg: Edema present. Right foot: Normal range of motion. Deformity present. No bunion or Charcot foot. Left foot: Normal range of motion. Deformity present. No bunion or Charcot foot. Feet:      Right foot:      Protective Sensation: 10 sites tested. 10 sites sensed. Skin integrity: Skin integrity normal.      Toenail Condition: Right toenails are abnormally thick. Left foot:      Protective Sensation: 10 sites tested. 10 sites sensed. Skin integrity: Skin integrity normal.      Toenail Condition: Left toenails are abnormally thick and ingrown. Skin:     General: Skin is warm. Capillary Refill: Capillary refill takes 2 to 3 seconds. Neurological:      Mental Status: She is alert and oriented to person, place, and time. Psychiatric:         Mood and Affect: Affect normal. Mood is anxious. Behavior: Behavior is cooperative. ASSESSMENT and PLAN    ICD-10-CM ICD-9-CM    1.  Chronic gout of foot, unspecified cause, unspecified laterality  M1A.0790 274.02 URIC ACID       Discussed and educated patient regarding her current medical condition  Instructed patient to monitor their feet daily, be compliant with all medications and wear supportive shoe gear. Loraine prescription for uric acid labs to be drawn.   Will adjust gout medication accordingly

## 2020-12-12 LAB — URATE SERPL-MCNC: 8 MG/DL (ref 3.1–7.9)

## 2020-12-14 NOTE — PROGRESS NOTES
Pt has elevated uric acid, please send Allopurinol 100mg every day. Also, send repeat labs for uric acid to be drawn in 1 month. Thank you!

## 2020-12-15 DIAGNOSIS — M1A.0790 CHRONIC GOUT OF FOOT, UNSPECIFIED CAUSE, UNSPECIFIED LATERALITY: Primary | ICD-10-CM

## 2020-12-15 RX ORDER — ALLOPURINOL 100 MG/1
100 TABLET ORAL DAILY
Qty: 30 TAB | Refills: 2 | Status: SHIPPED | OUTPATIENT
Start: 2020-12-15 | End: 2021-01-11 | Stop reason: SDUPTHER

## 2021-01-11 DIAGNOSIS — M1A.0790 CHRONIC GOUT OF FOOT, UNSPECIFIED CAUSE, UNSPECIFIED LATERALITY: ICD-10-CM

## 2021-01-12 RX ORDER — ALLOPURINOL 100 MG/1
100 TABLET ORAL DAILY
Qty: 30 TAB | Refills: 2 | Status: SHIPPED | OUTPATIENT
Start: 2021-01-12 | End: 2021-04-16

## 2021-01-21 ENCOUNTER — OFFICE VISIT (OUTPATIENT)
Dept: PODIATRY | Age: 76
End: 2021-01-21
Payer: MEDICARE

## 2021-01-21 VITALS
TEMPERATURE: 97.3 F | OXYGEN SATURATION: 100 % | SYSTOLIC BLOOD PRESSURE: 134 MMHG | DIASTOLIC BLOOD PRESSURE: 69 MMHG | WEIGHT: 179.4 LBS | HEART RATE: 80 BPM | HEIGHT: 62 IN | BODY MASS INDEX: 33.01 KG/M2

## 2021-01-21 DIAGNOSIS — M1A.0790 CHRONIC GOUT OF FOOT, UNSPECIFIED CAUSE, UNSPECIFIED LATERALITY: ICD-10-CM

## 2021-01-21 DIAGNOSIS — E11.42 DIABETIC POLYNEUROPATHY ASSOCIATED WITH TYPE 2 DIABETES MELLITUS (HCC): Primary | ICD-10-CM

## 2021-01-21 DIAGNOSIS — L60.3 ONYCHODYSTROPHY: ICD-10-CM

## 2021-01-21 DIAGNOSIS — E11.42 TYPE 2 DIABETES MELLITUS WITH DIABETIC POLYNEUROPATHY, WITHOUT LONG-TERM CURRENT USE OF INSULIN (HCC): ICD-10-CM

## 2021-01-21 PROCEDURE — G8752 SYS BP LESS 140: HCPCS | Performed by: PODIATRIST

## 2021-01-21 PROCEDURE — 1090F PRES/ABSN URINE INCON ASSESS: CPT | Performed by: PODIATRIST

## 2021-01-21 PROCEDURE — G8754 DIAS BP LESS 90: HCPCS | Performed by: PODIATRIST

## 2021-01-21 PROCEDURE — G8400 PT W/DXA NO RESULTS DOC: HCPCS | Performed by: PODIATRIST

## 2021-01-21 PROCEDURE — G8427 DOCREV CUR MEDS BY ELIG CLIN: HCPCS | Performed by: PODIATRIST

## 2021-01-21 PROCEDURE — 99214 OFFICE O/P EST MOD 30 MIN: CPT | Performed by: PODIATRIST

## 2021-01-21 PROCEDURE — 11721 DEBRIDE NAIL 6 OR MORE: CPT | Performed by: PODIATRIST

## 2021-01-21 PROCEDURE — 1101F PT FALLS ASSESS-DOCD LE1/YR: CPT | Performed by: PODIATRIST

## 2021-01-21 PROCEDURE — G8417 CALC BMI ABV UP PARAM F/U: HCPCS | Performed by: PODIATRIST

## 2021-01-21 PROCEDURE — G8536 NO DOC ELDER MAL SCRN: HCPCS | Performed by: PODIATRIST

## 2021-01-21 PROCEDURE — G8432 DEP SCR NOT DOC, RNG: HCPCS | Performed by: PODIATRIST

## 2021-01-21 PROCEDURE — 2022F DILAT RTA XM EVC RTNOPTHY: CPT | Performed by: PODIATRIST

## 2021-01-21 PROCEDURE — 3046F HEMOGLOBIN A1C LEVEL >9.0%: CPT | Performed by: PODIATRIST

## 2021-01-21 PROCEDURE — 3017F COLORECTAL CA SCREEN DOC REV: CPT | Performed by: PODIATRIST

## 2021-01-29 PROBLEM — E11.42 TYPE 2 DIABETES MELLITUS WITH DIABETIC POLYNEUROPATHY, WITHOUT LONG-TERM CURRENT USE OF INSULIN (HCC): Status: ACTIVE | Noted: 2021-01-29

## 2021-01-29 NOTE — PROGRESS NOTES
HISTORY OF PRESENT ILLNESS  Annamaria Nixon is a 76 y.o. female is being seen in the office needing a diabetic foot exam and complaining of an acute on chronic complaint of pain to her right big toe. Patient states her diabetes has been under control as of late. She is admitting to pain rising level of 4 out of 10 to her big toe joints. She states she has a history of gout but her uric acid has not been checked lately. She denies any trauma to the area. She denies any changes in shoe gear or increase in activity. She states weightbearing exacerbates the pain. She denies any breaks in the skin. She denies any local/systemic signs infection. She denies any other pedal complaints    Review of Systems   Constitutional: Negative. HENT: Negative. Eyes: Negative. Respiratory: Negative. Cardiovascular: Positive for leg swelling. Gastrointestinal: Negative. Genitourinary: Negative. Musculoskeletal: Positive for back pain, joint pain and myalgias. Skin: Negative. Neurological: Positive for sensory change. Endo/Heme/Allergies: Negative. Psychiatric/Behavioral: Positive for depression. The patient is nervous/anxious. All other systems reviewed and are negative.     Past Medical History:   Diagnosis Date    Anxiety     Carpal tunnel syndrome, bilateral     Diabetes ([facility] [address]     Gout     H/O sleep apnea     CPAP    Heart attack ([facility] [address] 1999    Heart disease     High cholesterol     Hypertension     Left anterior fascicular block     Myocardial infarction (HCC)     Obesity, Class I, BMI 30-34.9     Right bundle branch block (RBBB) determined by electrocardiography      Family History   Problem Relation Age of Onset    Cancer Mother     Diabetes Father     Heart Disease Father      Social History     Socioeconomic History    Marital status:      Spouse name: Not on file    Number of children: Not on file    Years of education: Not on file    Highest education level: Not on file   Occupational History    Not on file   Social Needs    Financial resource strain: Not on file    Food insecurity     Worry: Not on file     Inability: Not on file    Transportation needs     Medical: Not on file     Non-medical: Not on file   Tobacco Use    Smoking status: Never Smoker    Smokeless tobacco: Never Used   Substance and Sexual Activity    Alcohol use: Never     Frequency: Never    Drug use: Never    Sexual activity: Not on file   Lifestyle    Physical activity     Days per week: Not on file     Minutes per session: Not on file    Stress: Not on file   Relationships    Social connections     Talks on phone: Not on file     Gets together: Not on file     Attends Zoroastrianism service: Not on file     Active member of club or organization: Not on file     Attends meetings of clubs or organizations: Not on file     Relationship status: Not on file    Intimate partner violence     Fear of current or ex partner: Not on file     Emotionally abused: Not on file     Physically abused: Not on file     Forced sexual activity: Not on file   Other Topics Concern    Not on file   Social History Narrative    Not on file     Past Surgical History:   Procedure Laterality Date    HX CARPAL TUNNEL RELEASE      HX CATARACT REMOVAL Bilateral     HX CORONARY ARTERY BYPASS GRAFT N/A 20 years ago    3 [address]    HX HYSTERECTOMY N/A     HX LITHOTRIPSY      cholelithotomy     Current Outpatient Medications   Medication Instructions    allopurinoL (ZYLOPRIM) 100 mg, Oral, DAILY    ALPRAZolam (XANAX) 0.25 mg, Oral, DAILY AS NEEDED    amLODIPine (NORVASC) 10 mg, Oral, 7AM    aspirin delayed-release (ECOTRIN) 325 mg, Oral, DAILY    atorvastatin (LIPITOR) 20 mg, Oral, EVERY BEDTIME    carvediloL (COREG) 12.5 mg, Oral, 2 TIMES DAILY    colchicine 0.6 mg, Oral, AS NEEDED    cyclobenzaprine (FLEXERIL) 10 mg, Oral, 3 TIMES DAILY AS NEEDED    gabapentin (NEURONTIN) 300 mg, Oral, 3 TIMES DAILY AS NEEDED    indomethacin (INDOCIN) 50 mg, Oral, 3 TIMES DAILY AS NEEDED    Lantus Solostar U-100 Insulin 50 Units, SubCUTAneous, 7AM    losartan-hydroCHLOROthiazide (HYZAAR) 100-12.5 mg per tablet 1 Tab, Oral, DAILY    metFORMIN (GLUCOPHAGE) 1,000 mg, Oral, 2 TIMES DAILY    multivitamin (ONE A DAY) tablet 1 Tab, Oral, DAILY    telmisartan (MICARDIS) 80 mg, Oral, DAILY     No Known Allergies    Visit Vitals  /69 (BP 1 Location: Left arm, BP Patient Position: Sitting)   Pulse 80   Temp 97.3 °F (36.3 °C) (Temporal)   Ht 5' 2\" (1.575 m)   Wt 179 lb 6.4 oz (81.4 kg)   SpO2 100%   BMI 32.81 kg/m²     Physical Exam  Vitals signs reviewed. Constitutional:       Appearance: She is obese. HENT:      Head:      Comments: Normal dentition  Cardiovascular:      Pulses:           Dorsalis pedis pulses are 2+ on the right side and 2+ on the left side. Posterior tibial pulses are 2+ on the right side and 2+ on the left side. Pulmonary:      Effort: Pulmonary effort is normal.   Musculoskeletal:      Right lower leg: Edema present. Left lower leg: Edema present. Right foot: Normal range of motion. Deformity present. No bunion or Charcot foot. Left foot: Normal range of motion. Deformity present. No bunion or Charcot foot. Feet:      Right foot:      Protective Sensation: 10 sites tested. 10 sites sensed. Skin integrity: Skin integrity normal.      Toenail Condition: Right toenails are abnormally thick. Left foot:      Protective Sensation: 10 sites tested. 10 sites sensed. Skin integrity: Skin integrity normal.      Toenail Condition: Left toenails are abnormally thick. Lymphadenopathy:      Lower Body: No right inguinal adenopathy. No left inguinal adenopathy. Skin:     General: Skin is warm. Capillary Refill: Capillary refill takes 2 to 3 seconds. Neurological:      Mental Status: She is alert and oriented to person, place, and time.    Psychiatric:         Mood and Affect: Affect normal. Mood is anxious. Behavior: Behavior is cooperative. ASSESSMENT and PLAN    ICD-10-CM ICD-9-CM    1. Diabetic polyneuropathy associated with type 2 diabetes mellitus (HCC)  E11.42 250.60      357.2    2. Type 2 diabetes mellitus with diabetic polyneuropathy, without long-term current use of insulin (HCC)  E11.42 250.60      357.2    3. Onychodystrophy  L60.3 703.8    4. Chronic gout of foot, unspecified cause, unspecified laterality  M1A.0790 274.02        Discussed and educated patient regarding her current medical condition  Instructed patient to monitor their feet daily, be compliant with all medications and wear supportive shoe gear. A sharp toenail plate debridement was performed to all 10 pedal digits with a nail nipper without incident. Patient tolerated well no dressing was needed  Gave a prescription for uric acid labs to be drawn. Will adjust gout medication accordingly.   Patient to continue with her colchicine and allopurinol as directed until labs have been drawn

## 2021-02-25 RX ORDER — AMMONIUM LACTATE 12 G/100G
LOTION TOPICAL
Qty: 225 G | Refills: 5 | Status: SHIPPED | OUTPATIENT
Start: 2021-02-25

## 2021-03-29 ENCOUNTER — OFFICE VISIT (OUTPATIENT)
Dept: PODIATRY | Age: 76
End: 2021-03-29
Payer: MEDICARE

## 2021-03-29 VITALS
SYSTOLIC BLOOD PRESSURE: 119 MMHG | TEMPERATURE: 95.9 F | HEART RATE: 88 BPM | WEIGHT: 180.6 LBS | BODY MASS INDEX: 33.23 KG/M2 | OXYGEN SATURATION: 99 % | HEIGHT: 62 IN | DIASTOLIC BLOOD PRESSURE: 62 MMHG

## 2021-03-29 DIAGNOSIS — L60.0 INGROWN TOENAIL OF BOTH FEET: Primary | ICD-10-CM

## 2021-03-29 PROCEDURE — G8417 CALC BMI ABV UP PARAM F/U: HCPCS | Performed by: PODIATRIST

## 2021-03-29 PROCEDURE — G8510 SCR DEP NEG, NO PLAN REQD: HCPCS | Performed by: PODIATRIST

## 2021-03-29 PROCEDURE — 1090F PRES/ABSN URINE INCON ASSESS: CPT | Performed by: PODIATRIST

## 2021-03-29 PROCEDURE — 99213 OFFICE O/P EST LOW 20 MIN: CPT | Performed by: PODIATRIST

## 2021-03-29 PROCEDURE — 3017F COLORECTAL CA SCREEN DOC REV: CPT | Performed by: PODIATRIST

## 2021-03-29 PROCEDURE — G8754 DIAS BP LESS 90: HCPCS | Performed by: PODIATRIST

## 2021-03-29 PROCEDURE — G8752 SYS BP LESS 140: HCPCS | Performed by: PODIATRIST

## 2021-03-29 PROCEDURE — 1101F PT FALLS ASSESS-DOCD LE1/YR: CPT | Performed by: PODIATRIST

## 2021-03-29 PROCEDURE — G8536 NO DOC ELDER MAL SCRN: HCPCS | Performed by: PODIATRIST

## 2021-03-29 PROCEDURE — G8400 PT W/DXA NO RESULTS DOC: HCPCS | Performed by: PODIATRIST

## 2021-03-29 PROCEDURE — G8427 DOCREV CUR MEDS BY ELIG CLIN: HCPCS | Performed by: PODIATRIST

## 2021-03-29 NOTE — PROGRESS NOTES
Chief Complaint   Patient presents with    Diabetic Foot Exam     A1C-unknown [id number];     Ingrown Toenail     1. Have you been to the ER, urgent care clinic since your last visit? Hospitalized since your last visit? No    2. Have you seen or consulted any other health care providers outside of the [address] [place] [address] since your last visit? Include any pap smears or colon screening.  No  PCP-Dr Tayler Valle

## 2021-03-29 NOTE — PROGRESS NOTES
HISTORY OF PRESENT ILLNESS  Tuyet Wu is a 76 y.o. female is being seen in the office complaining of acute on chronic pain to her big toes. Patient states her diabetes has been under control as of late. She is admitting to pain rising level of 4 out of 10 to her big toe joints. She states she has a history of gout but her uric acid has not been checked lately. She denies any trauma to the area. She denies any changes in shoe gear or increase in activity. She states weightbearing exacerbates the pain. She denies any breaks in the skin. She denies any local/systemic signs infection. She denies any other pedal complaints    Review of Systems   Constitutional: Negative. HENT: Negative. Eyes: Negative. Respiratory: Negative. Cardiovascular: Positive for leg swelling. Gastrointestinal: Negative. Genitourinary: Negative. Musculoskeletal: Positive for back pain, joint pain and myalgias. Skin: Negative. Neurological: Positive for sensory change. Endo/Heme/Allergies: Negative. Psychiatric/Behavioral: Positive for depression. The patient is nervous/anxious. All other systems reviewed and are negative.     Past Medical History:   Diagnosis Date    Anxiety     Carpal tunnel syndrome, bilateral     Diabetes ([facility]     Gout     H/O sleep apnea     CPAP    Heart attack ([facility] 1999    Heart disease     High cholesterol     Hypertension     Left anterior fascicular block     Myocardial infarction (HCC)     Obesity, Class I, BMI 30-34.9     Right bundle branch block (RBBB) determined by electrocardiography      Family History   Problem Relation Age of Onset    Cancer Mother     Diabetes Father     Heart Disease Father      Social History     Socioeconomic History    Marital status:      Spouse name: Not on file    Number of children: Not on file    Years of education: Not on file    Highest education level: Not on file   Occupational History    Not on file   Social Needs    Financial resource strain: Not on file    Food insecurity     Worry: Not on file     Inability: Not on file    Transportation needs     Medical: Not on file     Non-medical: Not on file   Tobacco Use    Smoking status: Never Smoker    Smokeless tobacco: Never Used   Substance and Sexual Activity    Alcohol use: Never     Frequency: Never    Drug use: Never    Sexual activity: Not on file   Lifestyle    Physical activity     Days per week: Not on file     Minutes per session: Not on file    Stress: Not on file   Relationships    Social connections     Talks on phone: Not on file     Gets together: Not on file     Attends Alevism service: Not on file     Active member of club or organization: Not on file     Attends meetings of clubs or organizations: Not on file     Relationship status: Not on file    Intimate partner violence     Fear of current or ex partner: Not on file     Emotionally abused: Not on file     Physically abused: Not on file     Forced sexual activity: Not on file   Other Topics Concern    Not on file   Social History Narrative    Not on file     Past Surgical History:   Procedure Laterality Date    HX CARPAL TUNNEL RELEASE      HX CATARACT REMOVAL Bilateral     HX CORONARY ARTERY BYPASS GRAFT N/A 20 years ago    [address]    HX HYSTERECTOMY N/A     HX LITHOTRIPSY      cholelithotomy     Current Outpatient Medications   Medication Instructions    allopurinoL (ZYLOPRIM) 100 mg, Oral, DAILY    ALPRAZolam (XANAX) 0.25 mg, Oral, DAILY AS NEEDED    amLODIPine (NORVASC) 10 mg, Oral, 7AM    ammonium lactate (LAC-HYDRIN) 12 % lotion APPLY EXTERNALLY TO THE AFFECTED AREA TWICE DAILY    aspirin delayed-release (ECOTRIN) 325 mg, Oral, DAILY    atorvastatin (LIPITOR) 20 mg, Oral, EVERY BEDTIME    carvediloL (COREG) 12.5 mg, Oral, 2 TIMES DAILY    colchicine 0.6 mg, Oral, AS NEEDED    cyclobenzaprine (FLEXERIL) 10 mg, Oral, 3 TIMES DAILY AS NEEDED    gabapentin (NEURONTIN) 300 mg, Oral, 3 TIMES DAILY AS NEEDED    indomethacin (INDOCIN) 50 mg, Oral, 3 TIMES DAILY AS NEEDED    Lantus Solostar U-100 Insulin 50 Units, SubCUTAneous, 7AM    losartan-hydroCHLOROthiazide (HYZAAR) 100-12.5 mg per tablet 1 Tab, Oral, DAILY    metFORMIN (GLUCOPHAGE) 1,000 mg, Oral, 2 TIMES DAILY    multivitamin (ONE A DAY) tablet 1 Tab, Oral, DAILY    telmisartan (MICARDIS) 80 mg, Oral, DAILY     No Known Allergies    Visit Vitals  /62 (BP 1 Location: Left upper arm, BP Patient Position: Sitting, BP Cuff Size: Large adult)   Pulse 88   Temp (!) 95.9 °F (35.5 °C) (Temporal)   Ht 5' 2\" (1.575 m)   Wt 180 lb 9.6 oz (81.9 kg)   SpO2 99%   BMI 33.03 kg/m²     Physical Exam  Vitals signs reviewed. Constitutional:       Appearance: She is obese. HENT:      Head:      Comments: Normal dentition  Cardiovascular:      Pulses:           Dorsalis pedis pulses are 2+ on the right side and 2+ on the left side. Posterior tibial pulses are 2+ on the right side and 2+ on the left side. Pulmonary:      Effort: Pulmonary effort is normal.   Musculoskeletal:      Right lower leg: Edema present. Left lower leg: Edema present. Right foot: Normal range of motion. Deformity present. No bunion or Charcot foot. Left foot: Normal range of motion. Deformity present. No bunion or Charcot foot. Feet:      Right foot:      Protective Sensation: 10 sites tested. 10 sites sensed. Skin integrity: Skin integrity normal.      Toenail Condition: Right toenails are abnormally thick. Left foot:      Protective Sensation: 10 sites tested. 10 sites sensed. Skin integrity: Skin integrity normal.      Toenail Condition: Left toenails are abnormally thick. Lymphadenopathy:      Lower Body: No right inguinal adenopathy. No left inguinal adenopathy. Skin:     General: Skin is warm. Capillary Refill: Capillary refill takes 2 to 3 seconds.    Neurological:      Mental Status: She is alert and oriented to person, place, and time. Psychiatric:         Mood and Affect: Affect normal. Mood is anxious. Behavior: Behavior is cooperative. ASSESSMENT and PLAN  Ingrown toenail, bilateral big toes      Discussed and educated patient regarding her current medical condition  Instructed patient to monitor their feet daily, be compliant with all medications and wear supportive shoe gear. Slant back procedures were performed to the offending nail borders without incident. Instructed patient that if symptoms persist, she may need a permanent solution.   Patient verbalized understanding

## 2021-04-15 DIAGNOSIS — M1A.0790 CHRONIC GOUT OF FOOT, UNSPECIFIED CAUSE, UNSPECIFIED LATERALITY: ICD-10-CM

## 2021-04-16 RX ORDER — ALLOPURINOL 100 MG/1
TABLET ORAL
Qty: 90 TAB | Refills: 2 | Status: SHIPPED | OUTPATIENT
Start: 2021-04-16 | End: 2021-08-11

## 2021-04-29 ENCOUNTER — TELEPHONE (OUTPATIENT)
Dept: PODIATRY | Age: 76
End: 2021-04-29

## 2021-06-07 ENCOUNTER — OFFICE VISIT (OUTPATIENT)
Dept: PODIATRY | Age: 76
End: 2021-06-07
Payer: MEDICARE

## 2021-06-07 VITALS
DIASTOLIC BLOOD PRESSURE: 80 MMHG | WEIGHT: 182.4 LBS | HEIGHT: 62 IN | SYSTOLIC BLOOD PRESSURE: 139 MMHG | BODY MASS INDEX: 33.57 KG/M2

## 2021-06-07 DIAGNOSIS — L30.9 DERMATITIS: Primary | ICD-10-CM

## 2021-06-07 DIAGNOSIS — B35.1 ONYCHOMYCOSIS: ICD-10-CM

## 2021-06-07 DIAGNOSIS — E11.42 TYPE 2 DIABETES MELLITUS WITH DIABETIC POLYNEUROPATHY, WITHOUT LONG-TERM CURRENT USE OF INSULIN (HCC): ICD-10-CM

## 2021-06-07 DIAGNOSIS — L60.0 INGROWN TOENAIL OF BOTH FEET: ICD-10-CM

## 2021-06-07 PROCEDURE — G8432 DEP SCR NOT DOC, RNG: HCPCS | Performed by: PODIATRIST

## 2021-06-07 PROCEDURE — 1101F PT FALLS ASSESS-DOCD LE1/YR: CPT | Performed by: PODIATRIST

## 2021-06-07 PROCEDURE — G8754 DIAS BP LESS 90: HCPCS | Performed by: PODIATRIST

## 2021-06-07 PROCEDURE — 1090F PRES/ABSN URINE INCON ASSESS: CPT | Performed by: PODIATRIST

## 2021-06-07 PROCEDURE — G8417 CALC BMI ABV UP PARAM F/U: HCPCS | Performed by: PODIATRIST

## 2021-06-07 PROCEDURE — G8427 DOCREV CUR MEDS BY ELIG CLIN: HCPCS | Performed by: PODIATRIST

## 2021-06-07 PROCEDURE — 99214 OFFICE O/P EST MOD 30 MIN: CPT | Performed by: PODIATRIST

## 2021-06-07 PROCEDURE — G8752 SYS BP LESS 140: HCPCS | Performed by: PODIATRIST

## 2021-06-07 PROCEDURE — G8400 PT W/DXA NO RESULTS DOC: HCPCS | Performed by: PODIATRIST

## 2021-06-07 PROCEDURE — 11755 BIOPSY NAIL UNIT: CPT | Performed by: PODIATRIST

## 2021-06-07 PROCEDURE — 11721 DEBRIDE NAIL 6 OR MORE: CPT | Performed by: PODIATRIST

## 2021-06-07 PROCEDURE — G8536 NO DOC ELDER MAL SCRN: HCPCS | Performed by: PODIATRIST

## 2021-06-07 RX ORDER — CLOTRIMAZOLE AND BETAMETHASONE DIPROPIONATE 10; .64 MG/G; MG/G
CREAM TOPICAL 2 TIMES DAILY
Qty: 45 G | Refills: 0 | Status: SHIPPED | OUTPATIENT
Start: 2021-06-07

## 2021-06-07 NOTE — PROGRESS NOTES
1. Have you been to the ER, urgent care clinic since your last visit? Hospitalized since your last visit? No    2. Have you seen or consulted any other health care providers outside of the [address] [place] [address] since your last visit? Include any pap smears or colon screening.  No     Chief Complaint   Patient presents with    Diabetic Foot Exam     last pcp visit over a year ago

## 2021-06-07 NOTE — PROGRESS NOTES
HISTORY OF PRESENT ILLNESS  Eleanor Erickson is a 68 y.o. female is being seen in the office complaining of acute on chronic pain to her big toes, a rash and needing a diabetic pedal exam.  Patient states her diabetes has been under control as of late (PCP: Milton Santiago MD, last visit over 1 year ago). She is admitting to pain rising level of 4 out of 10 to her big toe joints. She states her toenails are thick/discolored but denies ever having testing performed to confirm a diagnosis. She denies any trauma to the area. She denies any changes in shoe gear or increase in activity. She states weightbearing exacerbates the pain. She denies any breaks in the skin but does admit to a local rash. She states she has tried over-the-counter medication without relief of her symptoms associated with the rash. She denies any local/systemic signs infection. She denies any other pedal complaints    Review of Systems   Constitutional: Negative. HENT: Negative. Eyes: Negative. Respiratory: Negative. Cardiovascular: Positive for leg swelling. Gastrointestinal: Negative. Genitourinary: Negative. Musculoskeletal: Positive for back pain, joint pain and myalgias. Skin: Negative. Neurological: Positive for sensory change. Endo/Heme/Allergies: Negative. Psychiatric/Behavioral: Positive for depression. The patient is nervous/anxious. All other systems reviewed and are negative.     Past Medical History:   Diagnosis Date    Anxiety     Carpal tunnel syndrome, bilateral     Diabetes ([facility]     Gout     H/O sleep apnea     CPAP    Heart attack ([facility] [facility] 1999    Heart disease     High cholesterol     Hypertension     Left anterior fascicular block     Myocardial infarction (HCC)     Obesity, Class I, BMI 30-34.9     Right bundle branch block (RBBB) determined by electrocardiography      Family History   Problem Relation Age of Onset    Cancer Mother     Diabetes Father     Heart Disease Father Social History     Socioeconomic History    Marital status:      Spouse name: Not on file    Number of children: Not on file    Years of education: Not on file    Highest education level: Not on file   Occupational History    Not on file   Tobacco Use    Smoking status: Never Smoker    Smokeless tobacco: Never Used   Vaping Use    Vaping Use: Never used   Substance and Sexual Activity    Alcohol use: Never    Drug use: Never    Sexual activity: Not on file   Other Topics Concern    Not on file   Social History Narrative    Not on file     Social Determinants of Health     Financial Resource Strain:     Difficulty of Paying Living Expenses:    Food Insecurity:     Worried About [address] in the Last Year:     [email or web address] [employer] in the Last Year:    Transportation Needs:     Lack of Transportation (Medical):      Lack of Transportation (Non-Medical):    Physical Activity:     Days of Exercise per Week:     Minutes of Exercise per Session:    Stress:     Feeling of Stress :    Social Connections:     Frequency of Communication with Friends and Family:     Frequency of Social Gatherings with Friends and Family:     Attends Anabaptist Services:     Active Member of Clubs or Organizations:     Attends Club or Organization Meetings:     Marital Status:    Intimate Partner Violence:     Fear of Current or Ex-Partner:     Emotionally Abused:     Physically Abused:     Sexually Abused:      Past Surgical History:   Procedure Laterality Date    HX CARPAL TUNNEL RELEASE      HX CATARACT REMOVAL Bilateral     HX CORONARY ARTERY BYPASS GRAFT N/A 20 years ago    [address]    HX HYSTERECTOMY N/A     HX LITHOTRIPSY      cholelithotomy     Current Outpatient Medications   Medication Instructions    allopurinoL (ZYLOPRIM) 100 mg tablet TAKE 1 TABLET BY MOUTH DAILY    ALPRAZolam (XANAX) 0.25 mg, Oral, DAILY AS NEEDED    amLODIPine (NORVASC) 10 mg, Oral, 7AM    ammonium lactate (LAC-HYDRIN) 12 % lotion APPLY EXTERNALLY TO THE AFFECTED AREA TWICE DAILY    aspirin delayed-release (ECOTRIN) 325 mg, Oral, DAILY    atorvastatin (LIPITOR) 20 mg, Oral, EVERY BEDTIME    carvediloL (COREG) 12.5 mg, Oral, 2 TIMES DAILY    colchicine 0.6 mg, Oral, AS NEEDED    cyclobenzaprine (FLEXERIL) 10 mg, Oral, 3 TIMES DAILY AS NEEDED    gabapentin (NEURONTIN) 300 mg, Oral, 3 TIMES DAILY AS NEEDED    indomethacin (INDOCIN) 50 mg, Oral, 3 TIMES DAILY AS NEEDED    Lantus Solostar U-100 Insulin 50 Units, SubCUTAneous, 7AM    losartan-hydroCHLOROthiazide (HYZAAR) 100-12.5 mg per tablet 1 Tablet, Oral, DAILY    metFORMIN (GLUCOPHAGE) 1,000 mg, Oral, 2 TIMES DAILY    multivitamin (ONE A DAY) tablet 1 Tablet, Oral, DAILY    telmisartan (MICARDIS) 80 mg, Oral, DAILY     No Known Allergies    Visit Vitals  /80 (BP 1 Location: Left upper arm, BP Patient Position: Sitting, BP Cuff Size: Adult)   Ht 5' 2\" (1.575 m)   Wt 182 lb 6.4 oz (82.7 kg)   BMI 33.36 kg/m²     Physical Exam  Vitals reviewed. Constitutional:       Appearance: She is obese. HENT:      Head:      Comments: Normal dentition  Cardiovascular:      Pulses:           Dorsalis pedis pulses are 2+ on the right side and 2+ on the left side. Posterior tibial pulses are 2+ on the right side and 2+ on the left side. Pulmonary:      Effort: Pulmonary effort is normal.   Musculoskeletal:      Right lower leg: Edema present. Left lower leg: Edema present. Right foot: Normal range of motion. Deformity present. No bunion or Charcot foot. Left foot: Normal range of motion. Deformity present. No bunion or Charcot foot. Feet:      Right foot:      Protective Sensation: 10 sites tested. 0 sites sensed. Skin integrity: Skin integrity normal.      Toenail Condition: Right toenails are abnormally thick and ingrown. Fungal disease present. Left foot:      Protective Sensation: 10 sites tested. 0 sites sensed.       Skin integrity: Skin integrity normal.      Toenail Condition: Left toenails are abnormally thick and ingrown. Fungal disease present. Lymphadenopathy:      Lower Body: No right inguinal adenopathy. No left inguinal adenopathy. Skin:     General: Skin is warm. Capillary Refill: Capillary refill takes 2 to 3 seconds. Findings: Rash present. Comments: Absent pedal hair growth with hyperpigmentation noted to the skin   Neurological:      Mental Status: She is alert and oriented to person, place, and time. Comments: Paresthesias noted to bilateral lower extremities   Psychiatric:         Mood and Affect: Affect normal. Mood is anxious. Behavior: Behavior is cooperative. ASSESSMENT and PLAN    ICD-10-CM ICD-9-CM    1. Dermatitis  L30.9 692.9    2. Onychomycosis  B35.1 110.1 BIOPSY, NAIL UNIT   3. Type 2 diabetes mellitus with diabetic polyneuropathy, without long-term current use of insulin ([facility])  E11.42 250.60      357.2    4. Ingrown toenail of both feet  L60.0 703.0            Discussed and educated patient regarding her current medical condition  Instructed patient to monitor their feet daily, be compliant with all medications and wear supportive shoe gear. A sharp toenail plate debridement was performed to all 10 pedal digits with a nail nipper without incident. Slant back procedures were performed to the offending nail borders without incident. Instructed patient that if symptoms persist, she may need a permanent solution. Patient verbalized understanding  It was determined that a nail plate biopsy will be taken of the right fifth digit to confirm or deny the presence of fungal/yeast elements. This was also performed with a nail nipper. Patient tolerated well no dressing was needed.   Instructed patient that when pathology results return, will discuss treatment options in more depth  A prescription was given for a topical steroid/antifungal cream to be applied twice daily for 2 weeks to the rash

## 2021-06-24 DIAGNOSIS — M1A.0790 CHRONIC GOUT OF FOOT, UNSPECIFIED CAUSE, UNSPECIFIED LATERALITY: ICD-10-CM

## 2021-06-24 DIAGNOSIS — B35.1 ONYCHOMYCOSIS: Primary | ICD-10-CM

## 2021-07-17 LAB
ALBUMIN SERPL-MCNC: 4.5 G/DL (ref 3.7–4.7)
ALP SERPL-CCNC: 110 IU/L (ref 48–121)
ALT SERPL-CCNC: 19 IU/L (ref 0–32)
AST SERPL-CCNC: 19 IU/L (ref 0–40)
BILIRUB DIRECT SERPL-MCNC: 0.09 MG/DL (ref 0–0.4)
BILIRUB SERPL-MCNC: 0.3 MG/DL (ref 0–1.2)
PROT SERPL-MCNC: 6.8 G/DL (ref 6–8.5)
URATE SERPL-MCNC: 8.1 MG/DL (ref 3.1–7.9)

## 2021-07-20 DIAGNOSIS — B35.1 ONYCHOMYCOSIS: Primary | ICD-10-CM

## 2021-07-20 DIAGNOSIS — M1A.0790 CHRONIC GOUT OF FOOT, UNSPECIFIED CAUSE, UNSPECIFIED LATERALITY: Primary | ICD-10-CM

## 2021-07-20 RX ORDER — TERBINAFINE HYDROCHLORIDE 250 MG/1
250 TABLET ORAL DAILY
Qty: 90 TABLET | Refills: 0 | Status: ON HOLD | OUTPATIENT
Start: 2021-07-20 | End: 2022-07-19

## 2021-07-20 NOTE — PROGRESS NOTES
Please let the patient know her liver function testing came back normal.  Her uric acid came back elevated. Even more so than her previous value 7 months prior. She would benefit greatly from a rheumatology referral and possible initiation of Ruthanna Nelson.   Referral has been placed

## 2021-08-11 ENCOUNTER — OFFICE VISIT (OUTPATIENT)
Dept: PODIATRY | Age: 76
End: 2021-08-11
Payer: MEDICARE

## 2021-08-11 VITALS
HEART RATE: 86 BPM | OXYGEN SATURATION: 99 % | BODY MASS INDEX: 31.18 KG/M2 | SYSTOLIC BLOOD PRESSURE: 122 MMHG | DIASTOLIC BLOOD PRESSURE: 82 MMHG | HEIGHT: 63 IN | TEMPERATURE: 96.8 F | WEIGHT: 176 LBS

## 2021-08-11 DIAGNOSIS — M1A.0790 CHRONIC GOUT OF FOOT, UNSPECIFIED CAUSE, UNSPECIFIED LATERALITY: Primary | ICD-10-CM

## 2021-08-11 PROCEDURE — G8417 CALC BMI ABV UP PARAM F/U: HCPCS | Performed by: PODIATRIST

## 2021-08-11 PROCEDURE — 99213 OFFICE O/P EST LOW 20 MIN: CPT | Performed by: PODIATRIST

## 2021-08-11 PROCEDURE — G8427 DOCREV CUR MEDS BY ELIG CLIN: HCPCS | Performed by: PODIATRIST

## 2021-08-11 PROCEDURE — G8536 NO DOC ELDER MAL SCRN: HCPCS | Performed by: PODIATRIST

## 2021-08-11 PROCEDURE — G8400 PT W/DXA NO RESULTS DOC: HCPCS | Performed by: PODIATRIST

## 2021-08-11 PROCEDURE — G8432 DEP SCR NOT DOC, RNG: HCPCS | Performed by: PODIATRIST

## 2021-08-11 PROCEDURE — G8754 DIAS BP LESS 90: HCPCS | Performed by: PODIATRIST

## 2021-08-11 PROCEDURE — G8752 SYS BP LESS 140: HCPCS | Performed by: PODIATRIST

## 2021-08-11 PROCEDURE — 1090F PRES/ABSN URINE INCON ASSESS: CPT | Performed by: PODIATRIST

## 2021-08-11 PROCEDURE — 1101F PT FALLS ASSESS-DOCD LE1/YR: CPT | Performed by: PODIATRIST

## 2021-08-11 RX ORDER — ALLOPURINOL 300 MG/1
300 TABLET ORAL DAILY
Qty: 90 TABLET | Refills: 0 | Status: SHIPPED | OUTPATIENT
Start: 2021-08-11 | End: 2021-10-18 | Stop reason: SDUPTHER

## 2021-08-11 NOTE — PROGRESS NOTES
Chief Complaint   Patient presents with    Diabetic Foot Exam     states side of right great toe is always tender     1. Have you been to the ER, urgent care clinic since your last visit? Hospitalized since your last visit? No    2. Have you seen or consulted any other health care providers outside of the [address] [place] [address] since your last visit? Include any pap smears or colon screening.  No  PCP-Dr Frankie Chapa

## 2021-08-25 LAB
G6PD BLD QN: 247 U/10E12 RBC (ref 127–427)
RBC # BLD AUTO: 4.45 X10E6/UL (ref 3.77–5.28)
URATE SERPL-MCNC: 4.6 MG/DL (ref 3.1–7.9)

## 2021-08-31 NOTE — PROGRESS NOTES
HISTORY OF PRESENT ILLNESS  Godfrey Wiley is a 68 y.o. female is being seen in the office complaining of acute on chronic pain to her big toes, specifically her right big toe. She is admitting to pain rising level of 2 out of 10. She denies any trauma to the area. She denies any changes in shoe gear or increase in activity. She states weightbearing exacerbates the pain. She denies any breaks in the skin. She denies any local/systemic signs infection. She denies any other pedal complaints    Review of Systems   Constitutional: Negative. HENT: Negative. Eyes: Negative. Respiratory: Negative. Cardiovascular: Positive for leg swelling. Gastrointestinal: Negative. Genitourinary: Negative. Musculoskeletal: Positive for back pain, joint pain and myalgias. Skin: Negative. Neurological: Positive for sensory change. Endo/Heme/Allergies: Negative. Psychiatric/Behavioral: Positive for depression. The patient is nervous/anxious. All other systems reviewed and are negative.     Past Medical History:   Diagnosis Date    Anxiety     Carpal tunnel syndrome, bilateral     Diabetes ([facility] [address] [address]     Gout     H/O sleep apnea     CPAP    Heart attack ([facility] [address] 1999    Heart disease     High cholesterol     Hypertension     Left anterior fascicular block     Myocardial infarction (HCC)     Obesity, Class I, BMI 30-34.9     Right bundle branch block (RBBB) determined by electrocardiography      Family History   Problem Relation Age of Onset    Cancer Mother     Diabetes Father     Heart Disease Father      Social History     Socioeconomic History    Marital status:      Spouse name: Not on file    Number of children: Not on file    Years of education: Not on file    Highest education level: Not on file   Occupational History    Not on file   Tobacco Use    Smoking status: Never Smoker    Smokeless tobacco: Never Used   Vaping Use    Vaping Use: Never used   Substance and Sexual Activity    Alcohol use: Never    Drug use: Never    Sexual activity: Not on file   Other Topics Concern    Not on file   Social History Narrative    Not on file     Social Determinants of Health     Financial Resource Strain:     Difficulty of Paying Living Expenses:    Food Insecurity:     Worried About Running Out of Food in the Last Year:     [address] in the Last Year:    Transportation Needs:     Lack of Transportation (Medical):      Lack of Transportation (Non-Medical):    Physical Activity:     Days of Exercise per Week:     Minutes of Exercise per Session:    Stress:     Feeling of Stress :    Social Connections:     Frequency of Communication with Friends and Family:     Frequency of Social Gatherings with Friends and Family:     Attends [employer] Services:     Active Member of Clubs or Organizations:     Attends Club or Organization Meetings:     Marital Status:    Intimate Partner Violence:     Fear of Current or Ex-Partner:     Emotionally Abused:     Physically Abused:     Sexually Abused:      Past Surgical History:   Procedure Laterality Date    HX CARPAL TUNNEL RELEASE      HX CATARACT REMOVAL Bilateral     HX CORONARY ARTERY BYPASS GRAFT N/A 20 years ago    [address]    HX HYSTERECTOMY N/A     HX LITHOTRIPSY      cholelithotomy     Current Outpatient Medications   Medication Instructions    allopurinoL (ZYLOPRIM) 300 mg, Oral, DAILY    ALPRAZolam (XANAX) 0.25 mg, Oral, DAILY AS NEEDED    amLODIPine (NORVASC) 10 mg, Oral, 7AM    ammonium lactate (LAC-HYDRIN) 12 % lotion APPLY EXTERNALLY TO THE AFFECTED AREA TWICE DAILY    aspirin delayed-release (ECOTRIN) 325 mg, Oral, DAILY    atorvastatin (LIPITOR) 20 mg, Oral, EVERY BEDTIME    carvediloL (COREG) 12.5 mg, Oral, 2 TIMES DAILY    clotrimazole-betamethasone (LOTRISONE) topical cream Topical, 2 TIMES DAILY    colchicine 0.6 mg, Oral, AS NEEDED    cyclobenzaprine (FLEXERIL) 10 mg, Oral, 3 TIMES DAILY AS NEEDED  gabapentin (NEURONTIN) 300 mg, Oral, 3 TIMES DAILY AS NEEDED    indomethacin (INDOCIN) 50 mg, Oral, 3 TIMES DAILY AS NEEDED    Lantus Solostar U-100 Insulin 50 Units, SubCUTAneous, 7AM    losartan-hydroCHLOROthiazide (HYZAAR) 100-12.5 mg per tablet 1 Tablet, Oral, DAILY    metFORMIN (GLUCOPHAGE) 1,000 mg, Oral, 2 TIMES DAILY    multivitamin (ONE A DAY) tablet 1 Tablet, Oral, DAILY    telmisartan (MICARDIS) 80 mg, Oral, DAILY    terbinafine HCL (LAMISIL) 250 mg, Oral, DAILY     No Known Allergies    Visit Vitals  /82 (BP 1 Location: Right upper arm, BP Patient Position: Sitting, BP Cuff Size: Large adult)   Pulse 86   Temp 96.8 °F (36 °C) (Temporal)   Ht 5' 3\" (1.6 m)   Wt 176 lb (79.8 kg)   SpO2 99%   BMI 31.18 kg/m²     Physical Exam  Vitals reviewed. Constitutional:       Appearance: She is obese. HENT:      Head:      Comments: Normal dentition  Cardiovascular:      Pulses:           Dorsalis pedis pulses are 2+ on the right side and 2+ on the left side. Posterior tibial pulses are 2+ on the right side and 2+ on the left side. Pulmonary:      Effort: Pulmonary effort is normal.   Musculoskeletal:      Right lower leg: Edema present. Left lower leg: Edema present. Right foot: Normal range of motion. Deformity present. No bunion or Charcot foot. Left foot: Normal range of motion. Deformity present. No bunion or Charcot foot. Feet:      Right foot:      Protective Sensation: 10 sites tested. 0 sites sensed. Skin integrity: Skin integrity normal.      Toenail Condition: Right toenails are abnormally thick and ingrown. Fungal disease present. Left foot:      Protective Sensation: 10 sites tested. 0 sites sensed. Skin integrity: Skin integrity normal.      Toenail Condition: Left toenails are abnormally thick and ingrown. Fungal disease present. Lymphadenopathy:      Lower Body: No right inguinal adenopathy. No left inguinal adenopathy.    Skin:     General: Skin is warm. Capillary Refill: Capillary refill takes 2 to 3 seconds. Comments: Absent pedal hair growth with hyperpigmentation noted to the skin   Neurological:      Mental Status: She is alert and oriented to person, place, and time. Comments: Paresthesias noted to bilateral lower extremities   Psychiatric:         Mood and Affect: Affect normal. Mood is anxious. Behavior: Behavior is cooperative. ASSESSMENT and PLAN    ICD-10-CM ICD-9-CM    1. Chronic gout of foot, unspecified cause, unspecified laterality  M1A.0790 274.02 [id number] DEHYDROGENASE      URIC ACID           Discussed and educated patient regarding her current medical condition  Instructed patient to monitor their feet daily, be compliant with all medications and wear supportive shoe gear. Orders given for uric acid and G6PD labwork  Also, rx sent for allopurinol 100mg every day for symptomatic relief        Km Griffith, [facility], [address] [place] [address] and [facility]  [address] [place] [address]  O: [phone]  F: [phone]  C: [phone]

## 2021-10-15 ENCOUNTER — TELEPHONE (OUTPATIENT)
Dept: RHEUMATOLOGY | Age: 76
End: 2021-10-15

## 2021-10-18 ENCOUNTER — OFFICE VISIT (OUTPATIENT)
Dept: RHEUMATOLOGY | Age: 76
End: 2021-10-18
Payer: MEDICARE

## 2021-10-18 VITALS
HEIGHT: 63 IN | TEMPERATURE: 98 F | BODY MASS INDEX: 31.01 KG/M2 | HEART RATE: 67 BPM | RESPIRATION RATE: 18 BRPM | WEIGHT: 175 LBS | DIASTOLIC BLOOD PRESSURE: 75 MMHG | SYSTOLIC BLOOD PRESSURE: 134 MMHG

## 2021-10-18 DIAGNOSIS — M1A.39X1 CHRONIC GOUT DUE TO RENAL IMPAIRMENT OF MULTIPLE SITES WITH TOPHUS: Primary | ICD-10-CM

## 2021-10-18 PROCEDURE — G0463 HOSPITAL OUTPT CLINIC VISIT: HCPCS | Performed by: INTERNAL MEDICINE

## 2021-10-18 PROCEDURE — 1090F PRES/ABSN URINE INCON ASSESS: CPT | Performed by: INTERNAL MEDICINE

## 2021-10-18 PROCEDURE — 1101F PT FALLS ASSESS-DOCD LE1/YR: CPT | Performed by: INTERNAL MEDICINE

## 2021-10-18 PROCEDURE — G8400 PT W/DXA NO RESULTS DOC: HCPCS | Performed by: INTERNAL MEDICINE

## 2021-10-18 PROCEDURE — G8417 CALC BMI ABV UP PARAM F/U: HCPCS | Performed by: INTERNAL MEDICINE

## 2021-10-18 PROCEDURE — G8754 DIAS BP LESS 90: HCPCS | Performed by: INTERNAL MEDICINE

## 2021-10-18 PROCEDURE — G8427 DOCREV CUR MEDS BY ELIG CLIN: HCPCS | Performed by: INTERNAL MEDICINE

## 2021-10-18 PROCEDURE — G8536 NO DOC ELDER MAL SCRN: HCPCS | Performed by: INTERNAL MEDICINE

## 2021-10-18 PROCEDURE — G8752 SYS BP LESS 140: HCPCS | Performed by: INTERNAL MEDICINE

## 2021-10-18 PROCEDURE — G8510 SCR DEP NEG, NO PLAN REQD: HCPCS | Performed by: INTERNAL MEDICINE

## 2021-10-18 PROCEDURE — 99205 OFFICE O/P NEW HI 60 MIN: CPT | Performed by: INTERNAL MEDICINE

## 2021-10-18 RX ORDER — ALLOPURINOL 300 MG/1
300 TABLET ORAL DAILY
Qty: 90 TABLET | Refills: 1 | Status: SHIPPED | OUTPATIENT
Start: 2021-10-18 | End: 2021-10-21 | Stop reason: SDUPTHER

## 2021-10-18 RX ORDER — HYDROCHLOROTHIAZIDE 25 MG/1
25 TABLET ORAL DAILY
COMMUNITY

## 2021-10-18 NOTE — PROGRESS NOTES
REASON FOR VISIT    This is the initial evaluation for Ms. Kitty Garcia a 68 y.o. BLACK/  DECLINED female for gout. The patient is referred to the [facility] at the request of Dr. Dominique Pierson. HISTORY OF PRESENT ILLNESS     I have reviewed and summarized old records from [employer] and [employer]    She has a history of gout for years involving her big toes that progressively worsened over the past year. She was treated with colchicine and indomethacin. In 12/11/2020, labs showed uric acid 8.0 mg/dL. She was started on allopurinol 100 mg daily. In 7/16/2021, labs showed uric acid 8.1 mg/dL    In 8/24/2021, labs showed uric acid 4.6 mg/dL, normal G6PD    Today, she complains of severe gouty flares involving her left 4th toe. Her last flare was several weeks ago. She avoids meats except chicken due to flaring. Her pain stabbing. The flare lasts about 2 to 3 days. She is taking colchicine which she thinks helps. She is not taking indomethacin or other NSAIDs. She has a flare about one once a month or twice and may be triggered by food. This has been flaring monthly for the past 1.5 years. Occasionally, she may have pain in the back of her left heal. She had a flare in her big toe about 3 weeks ago, does not remember which side. She has not noticed much benefit on allopurinol.       Therapy Includes:  NSAIDs: no  Colchicine prophylaxis: yes  Current uricosuric therapy: none  Current urate-lowering therapy: allopurinol 300 mg daily (10/28/2020 to present)  Previous urate-lowering therapy: allopurinol 100 mg daily (12/15/2020 to 8/11/2021)  Discontinued uricosuric because of inefficacy: none  Discontinued uricosuric because of side effects: none  Discontinued urate-lowering therapy because of inefficacy: none  Discontinued urate-lowering therapy because of side effects: none  Contraindicated urate-lowering therapy because of CKD: none  Contra-Indicated urate-lowering therapy because of FDA warning of all-cause mortality and cardiac death: Uloric (febuxostat)  G6PD Status: normal (8/24/2021)  Current anti-immunogenicity DMARD therapy: none    REVIEW OF SYSTEMS    A 15 point review of systems was performed and summarized below. The questionnaire was reviewed with the patient and scanned into the patient's medical record.     General: denies recent weight gain, recent weight loss, fatigue, weakness, fever, drenching night sweats  Musculoskeletal: endorses denies joint pain, joint swelling, morning stiffness, muscle pain  Ears: denies ringing in ears, hearing loss, deafness  Eyes: endorses dryness, denies pain, light sensitive, redness, blindness, double vision, blurred vision, excess tearing, foreign body sensation  Mouth: denies sore tongue, oral ulcers, loss of taste, dryness, increased dental caries  Nose: denies nosebleeds, nasal ulcers  Throat: denies food stuck when swallowing, difficulty with swallowing, hoarseness, pain in jaw while chewing  Neck: denies swollen glands, tender glands  Cardiopulmonary: denies pain in chest with deep breaths, pain in chest when lying down, murmurs, sudden changes in heart beat, wheezing, dry cough, productive cough, shortness of breath at rest, shortness of breath on exertion, coughing of blood  Gastrointestinal: denies nausea, heartburn, stomach pain relieved by food, chronic constipation, chronic diarrhea, blood in stools, black stools  Genitourinary: denies vaginal dryness, pain or burning on urination, blood in urine, cloudy urine, vaginal ulcers  Hematologic: denies anemia, bleeding tendency, blood clots, bleeding gums  Skin: denies easy bruising, hair loss, rash, rash worsened after sun exposure, hives/urticaria, skin thickening, skin tightness, nodules/bumps, color changes of hands or feet in the cold (Raynaud's)  Neurologic: denies numbness or tingling in hands, numbness or tingling in feet, muscle weakness  Psychiatric: denies depression, excessive worries, PTSD, Bipolar    PAST MEDICAL HISTORY    She has a past medical history of Anxiety, Carpal tunnel syndrome, bilateral, Diabetes ([facility] [address] Gout, H/O sleep apnea, Heart attack ([facility] [address] (1999), Heart disease, High cholesterol, Hypertension, Left anterior fascicular block, Myocardial infarction ([facility] [address] Obesity, Class I, BMI 30-34.9, and Right bundle branch block (RBBB) determined by electrocardiography. FAMILY HISTORY    Her family history includes Cancer in her mother; Diabetes in her father; Heart Disease in her father. SOCIAL HISTORY    She reports that she has never smoked. She has never used smokeless tobacco. She reports that she does not drink alcohol and does not use drugs. MEDICATIONS    Current Outpatient Medications   Medication Sig    hydroCHLOROthiazide (HYDRODIURIL) 25 mg tablet Take 25 mg by mouth daily.  allopurinoL (ZYLOPRIM) 300 mg tablet Take 1 Tablet by mouth daily for 90 days.  clotrimazole-betamethasone (LOTRISONE) topical cream Apply  to affected area two (2) times a day.  ammonium lactate (LAC-HYDRIN) 12 % lotion APPLY EXTERNALLY TO THE AFFECTED AREA TWICE DAILY    telmisartan (MICARDIS) 80 mg tablet Take 80 mg by mouth daily.  multivitamin (ONE A DAY) tablet Take 1 Tab by mouth daily.  aspirin delayed-release (ECOTRIN) 325 mg tablet Take 325 mg by mouth daily.  ALPRAZolam (XANAX) 0.25 mg tablet Take 0.25 mg by mouth daily as needed for Anxiety.  cyclobenzaprine (FLEXERIL) 10 mg tablet Take 1 Tab by mouth three (3) times daily as needed for Muscle Spasm(s).  metFORMIN (GLUCOPHAGE) 1,000 mg tablet Take 1,000 mg by mouth two (2) times a day.  gabapentin (NEURONTIN) 300 mg capsule Take 300 mg by mouth three (3) times daily as needed.  carvedilol (COREG) 12.5 mg tablet Take 12.5 mg by mouth two (2) times a day.  amLODIPine (NORVASC) 10 mg tablet Take 10 mg by mouth every morning.     atorvastatin (LIPITOR) 20 mg tablet Take 20 mg by mouth nightly.  colchicine 0.6 mg tablet Take 0.6 mg by mouth as needed.  LANTUS SOLOSTAR U-100 INSULIN 100 unit/mL (3 mL) inpn 50 Units by SubCUTAneous route every morning.  terbinafine HCL (LAMISIL) 250 mg tablet Take 1 Tablet by mouth daily. (Patient not taking: Reported on 10/18/2021)     No current facility-administered medications for this visit. ALLERGIES    No Known Allergies    PHYSICAL EXAMINATION    Visit Vitals  /75   Pulse 67   Temp 98 °F (36.7 °C)   Resp 18   Ht 5' 3\" (1.6 m)   Wt 175 lb (79.4 kg)   BMI 31.00 kg/m²     Body mass index is 31 kg/m². General: Patient is alert, oriented x 3, not in acute distress    HEENT:   Conjunctiva are not injected and appear moist, there is no alopecia. Cardiovascular:  Heart is regular rate and rhythm, no murmurs. Chest:  Lungs are clear to auscultation bilaterally. Extremities:  Free of clubbing, cyanosis, edema, extremities well perfused. Neurological exam:  Muscle strength is full in upper and lower extremities. Skin exam:  There are no rashes, no psoriasis, no active Raynaud's, no livedo reticularis, no periungual erythema. Visible Tophi: none    Musculoskeletal exam:  A comprehensive musculoskeletal exam was performed for all joints of each upper and lower extremity and assessed for swelling, tenderness and range of motion. Pertinent results are documented as below:     Left: 1st IP, 34rd and 4th MTP tenderness without warmth, redness or swelling    DATA REVIEW    Studies Reviewed:     Prior medical records were reviewed and are summarized as below:    Laboratory data: summarized in the HPI    Imaging: summarized in the HPI. 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  She has a long standing history untreated gout and was recently started on allopurinol with good uric acid response but continues to flare at least once to twice per month, most prominent on her left 4th toe. She is currently on allopurinol 300 mg daily and her most recent uric acid was 4.6 mg/dL, but I am not certain if that during a flare. She reports flares involving her big toes as well. On exam, she had tenderness of her left 1st IP and 3rd and 4th MTP without swelling, warmth or redness. She is no longer taking colchicine daily, so I asked her to take it twice daily if flaring but to also use an NSAID if flaring. I explained that allopurinol dosing is based on her uric acid levels, with goals less than 5 mg/dL and her renal function. I do not have any recent renal panels, so I ordered an update. Her flares during the month may be gout but an alternative issue may be on the differential too if her uric acid levels are suppressed adequately. I ordered foot radiographs but out tech is off today. I discussed HCTZ has a precipitator for hyperuricemia and gout. I recommend losartan over other ARBs due to its uricosuric properties. A total of 65 minutes was spent on this visit, reviewing interval notes, interval testing results, ordering tests, refilling medications, documenting the findings in the note, patient education, counseling, and coordination of care as described above. All questions asked and answered.       TODAY'S ORDERS    Orders Placed This Encounter    XR FOOT RT MIN 3 V    XR FOOT LT MIN 3 V    URIC ACID    CBC WITH AUTOMATED DIFF    METABOLIC PANEL, COMPREHENSIVE    allopurinoL (ZYLOPRIM) 300 mg tablet     Future Appointments   Date Time Provider Kimmy Matai   10/20/2021 11:00 AM Froilan Grijalva DPM RPTHOMAS BS AMB   1/13/2022 10:40 AM Evelyne Zhou MD AOCR BS AMB     Varsha Landis MD, [address] [facility]    Adult Rheumatology   Rheumatology Ultrasound Certified  [address]  [facility], [address]   Phone [phone]  Fax [phone]    Patient Care Team:  Robert Cueva MD as PCP - General (Family Medicine)

## 2021-10-18 NOTE — LETTER
10/18/2021    Patient: Chay Hearn   YOB: 1945   Date of Visit: 10/18/2021     Kaitlyn Smith MD  [facility] [address]  Via Fax: [phone]     Maribell File, [address] [address]  Via In [facility] [address]    Dear Kaitlyn Smith MD  Maribell File, DPM,      Thank you for referring Ms. Chay Hearn to [facility] for evaluation. My notes for this consultation are attached. If you have questions, please do not hesitate to call me. I look forward to following your patient along with you.       Sincerely,    Jenniffer Chavarria MD

## 2021-10-20 ENCOUNTER — OFFICE VISIT (OUTPATIENT)
Dept: PODIATRY | Age: 76
End: 2021-10-20
Payer: MEDICARE

## 2021-10-20 VITALS
HEART RATE: 59 BPM | DIASTOLIC BLOOD PRESSURE: 80 MMHG | WEIGHT: 177.4 LBS | SYSTOLIC BLOOD PRESSURE: 163 MMHG | TEMPERATURE: 97.4 F | BODY MASS INDEX: 31.43 KG/M2 | HEIGHT: 63 IN

## 2021-10-20 DIAGNOSIS — E11.42 TYPE 2 DIABETES MELLITUS WITH DIABETIC POLYNEUROPATHY, WITHOUT LONG-TERM CURRENT USE OF INSULIN (HCC): Primary | ICD-10-CM

## 2021-10-20 DIAGNOSIS — L60.3 ONYCHODYSTROPHY: ICD-10-CM

## 2021-10-20 PROCEDURE — G8753 SYS BP > OR = 140: HCPCS | Performed by: PODIATRIST

## 2021-10-20 PROCEDURE — G8536 NO DOC ELDER MAL SCRN: HCPCS | Performed by: PODIATRIST

## 2021-10-20 PROCEDURE — 1101F PT FALLS ASSESS-DOCD LE1/YR: CPT | Performed by: PODIATRIST

## 2021-10-20 PROCEDURE — 99213 OFFICE O/P EST LOW 20 MIN: CPT | Performed by: PODIATRIST

## 2021-10-20 PROCEDURE — 11721 DEBRIDE NAIL 6 OR MORE: CPT | Performed by: PODIATRIST

## 2021-10-20 PROCEDURE — 1090F PRES/ABSN URINE INCON ASSESS: CPT | Performed by: PODIATRIST

## 2021-10-20 PROCEDURE — G8754 DIAS BP LESS 90: HCPCS | Performed by: PODIATRIST

## 2021-10-20 PROCEDURE — G8432 DEP SCR NOT DOC, RNG: HCPCS | Performed by: PODIATRIST

## 2021-10-20 PROCEDURE — G8417 CALC BMI ABV UP PARAM F/U: HCPCS | Performed by: PODIATRIST

## 2021-10-20 PROCEDURE — G8427 DOCREV CUR MEDS BY ELIG CLIN: HCPCS | Performed by: PODIATRIST

## 2021-10-20 PROCEDURE — G8400 PT W/DXA NO RESULTS DOC: HCPCS | Performed by: PODIATRIST

## 2021-10-20 NOTE — PROGRESS NOTES
[facility]      HISTORY OF PRESENT ILLNESS  Rangel Lawler is a 68 y.o. female is being seen in the office complaining of acute on chronic pain to her big toes and needing a diabetic pedal exam.  Patient states her diabetes has been under control as of late (PCP: Tiesha Montano MD, last visit 09/08/2021). She describes her diabetes being under control, noting her last hemoglobin A1c was 8%. She is admitting to pain rising level of 3 out of 10 to her big toe joints. She states she is currently under the care of a local rheumatologist for her chronic gout. She denies any trauma to the area. She denies any changes in shoe gear or increase in activity. She states weightbearing exacerbates the pain. She denies any local/systemic signs infection. She denies any other pedal complaints    Review of Systems   Constitutional: Negative. HENT: Negative. Eyes: Negative. Respiratory: Negative. Cardiovascular: Positive for leg swelling. Gastrointestinal: Negative. Genitourinary: Negative. Musculoskeletal: Positive for back pain, joint pain and myalgias. Skin: Negative. Neurological: Positive for sensory change. Endo/Heme/Allergies: Negative. Psychiatric/Behavioral: Positive for depression. The patient is nervous/anxious. All other systems reviewed and are negative.     Past Medical History:   Diagnosis Date    Anxiety     Carpal tunnel syndrome, bilateral     Diabetes ([facility] [address]     Gout     H/O sleep apnea     CPAP    Heart attack ([facility] [address] 1999    Heart disease     High cholesterol     Hypertension     Left anterior fascicular block     Myocardial infarction (HCC)     Obesity, Class I, BMI 30-34.9     Right bundle branch block (RBBB) determined by electrocardiography      Family History   Problem Relation Age of Onset    Cancer Mother     Diabetes Father     Heart Disease Father      Social History     Socioeconomic History    Marital status:  Spouse name: Not on file    Number of children: Not on file    Years of education: Not on file    Highest education level: Not on file   Occupational History    Not on file   Tobacco Use    Smoking status: Never Smoker    Smokeless tobacco: Never Used   Vaping Use    Vaping Use: Never used   Substance and Sexual Activity    Alcohol use: Never    Drug use: Never    Sexual activity: Not on file   Other Topics Concern    Not on file   Social History Narrative    Not on file     Social Determinants of Health     Financial Resource Strain:     Difficulty of Paying Living Expenses: Not on file   Food Insecurity:     Worried About Running Out of Food in the Last Year: Not on file    Chiki of Food in the Last Year: Not on file   Transportation Needs:     Lack of Transportation (Medical): Not on file    Lack of Transportation (Non-Medical):  Not on file   Physical Activity:     Days of Exercise per Week: Not on file    Minutes of Exercise per Session: Not on file   Stress:     Feeling of Stress : Not on file   Social Connections:     Frequency of Communication with Friends and Family: Not on file    Frequency of Social Gatherings with Friends and Family: Not on file    Attends Mu-ism Services: Not on file    Active Member of [address] [place] [address] [employer] or Organizations: Not on file    Attends Club or Organization Meetings: Not on file    Marital Status: Not on file   Intimate Partner Violence:     Fear of Current or Ex-Partner: Not on file    Emotionally Abused: Not on file    Physically Abused: Not on file    Sexually Abused: Not on file   Housing Stability:     Unable to Pay for Housing in the Last Year: Not on file    Number of [place] in the Last Year: Not on file    Unstable Housing in the Last Year: Not on file     Past Surgical History:   Procedure Laterality Date    HX CARPAL TUNNEL RELEASE      HX CATARACT REMOVAL Bilateral     HX CORONARY ARTERY BYPASS GRAFT N/A 20 years ago    3 way    HX HYSTERECTOMY N/A     HX LITHOTRIPSY      cholelithotomy     Current Outpatient Medications   Medication Instructions    allopurinoL (ZYLOPRIM) 600 mg, Oral, DAILY    ALPRAZolam (XANAX) 0.25 mg, Oral, DAILY AS NEEDED    amLODIPine (NORVASC) 10 mg, Oral, 7AM    ammonium lactate (LAC-HYDRIN) 12 % lotion APPLY EXTERNALLY TO THE AFFECTED AREA TWICE DAILY    aspirin delayed-release (ECOTRIN) 325 mg, Oral, DAILY    atorvastatin (LIPITOR) 20 mg, Oral, EVERY BEDTIME    carvediloL (COREG) 12.5 mg, Oral, 2 TIMES DAILY    clotrimazole-betamethasone (LOTRISONE) topical cream Topical, 2 TIMES DAILY    colchicine 0.6 mg, Oral, AS NEEDED    cyclobenzaprine (FLEXERIL) 10 mg, Oral, 3 TIMES DAILY AS NEEDED    gabapentin (NEURONTIN) 300 mg, Oral, 3 TIMES DAILY AS NEEDED    hydroCHLOROthiazide (HYDRODIURIL) 25 mg, Oral, DAILY    Lantus Solostar U-100 Insulin 50 Units, SubCUTAneous, 7AM    metFORMIN (GLUCOPHAGE) 1,000 mg, Oral, 2 TIMES DAILY    multivitamin (ONE A DAY) tablet 1 Tablet, Oral, DAILY    telmisartan (MICARDIS) 80 mg, Oral, DAILY    terbinafine HCL (LAMISIL) 250 mg, Oral, DAILY     No Known Allergies    Visit Vitals  BP (!) 163/80 (BP 1 Location: Left upper arm, BP Patient Position: Sitting, BP Cuff Size: Adult)   Pulse (!) 59   Temp 97.4 °F (36.3 °C) (Temporal)   Ht 5' 3\" (1.6 m)   Wt 177 lb 6.4 oz (80.5 kg)   BMI 31.42 kg/m²     Physical Exam  Vitals reviewed. Constitutional:       Appearance: She is obese. Cardiovascular:      Pulses:           Dorsalis pedis pulses are 2+ on the right side and 2+ on the left side. Posterior tibial pulses are 2+ on the right side and 2+ on the left side. Pulmonary:      Effort: Pulmonary effort is normal.   Musculoskeletal:      Right lower leg: Edema present. Left lower leg: Edema present. Right foot: Normal range of motion. Deformity present. No bunion or Charcot foot. Left foot: Normal range of motion. Deformity present.  No bunion or Charcot foot. Feet:      Right foot:      Protective Sensation: 10 sites tested. 0 sites sensed. Skin integrity: Skin integrity normal.      Toenail Condition: Right toenails are abnormally thick and ingrown. Fungal disease present. Left foot:      Protective Sensation: 10 sites tested. 0 sites sensed. Skin integrity: Skin integrity normal.      Toenail Condition: Left toenails are abnormally thick and ingrown. Fungal disease present. Lymphadenopathy:      Lower Body: No right inguinal adenopathy. No left inguinal adenopathy. Skin:     General: Skin is warm. Capillary Refill: Capillary refill takes 2 to 3 seconds. Findings: Rash present. Comments: Absent pedal hair growth with hyperpigmentation noted to the skin   Neurological:      Mental Status: She is alert and oriented to person, place, and time. Comments: Paresthesias noted to bilateral lower extremities   Psychiatric:         Mood and Affect: Affect normal. Mood is anxious. Behavior: Behavior is cooperative. ASSESSMENT and PLAN    ICD-10-CM ICD-9-CM    1. Type 2 diabetes mellitus with diabetic polyneuropathy, without long-term current use of insulin ([facility])  E11.42 250.60      357.2    2. Onychodystrophy  L60.3 703.8          Discussed and educated patient regarding her current medical condition  Instructed patient to monitor their feet daily, be compliant with all medications and wear supportive shoe gear. A sharp toenail plate debridement was performed to all 10 pedal digits with a nail nipper without incident. Slant back procedures were performed to the offending nail borders without incident. Instructed patient that if symptoms persist, she may need a permanent solution. Patient verbalized understanding        Lester Edwards.  JANEL Grijalva, [address] [facility]  [address] [place] [address]  O: [phone]  F: [phone]  C: [phone]

## 2021-10-20 NOTE — PROGRESS NOTES
1. Have you been to the ER, urgent care clinic since your last visit? Hospitalized since your last visit? No    2. Have you seen or consulted any other health care providers outside of the [address] [place] [address] since your last visit? Include any pap smears or colon screening.  No     Chief Complaint   Patient presents with    Diabetic Foot Exam     last pcp visit 09/2021; last A1c 8%; last glucose 107

## 2021-10-21 DIAGNOSIS — M1A.39X1 CHRONIC GOUT DUE TO RENAL IMPAIRMENT OF MULTIPLE SITES WITH TOPHUS: Primary | ICD-10-CM

## 2021-10-21 DIAGNOSIS — M1A.39X1 CHRONIC GOUT DUE TO RENAL IMPAIRMENT OF MULTIPLE SITES WITH TOPHUS: ICD-10-CM

## 2021-10-21 LAB
ALBUMIN SERPL-MCNC: 4.2 G/DL (ref 3.7–4.7)
ALBUMIN/GLOB SERPL: 1.6 {RATIO} (ref 1.2–2.2)
ALP SERPL-CCNC: 110 IU/L (ref 44–121)
ALT SERPL-CCNC: 11 IU/L (ref 0–32)
AST SERPL-CCNC: 17 IU/L (ref 0–40)
BASOPHILS # BLD AUTO: 0 X10E3/UL (ref 0–0.2)
BASOPHILS NFR BLD AUTO: 0 %
BILIRUB SERPL-MCNC: 0.3 MG/DL (ref 0–1.2)
BUN SERPL-MCNC: 20 MG/DL (ref 8–27)
BUN/CREAT SERPL: 22 (ref 12–28)
CALCIUM SERPL-MCNC: 9.8 MG/DL (ref 8.7–10.3)
CHLORIDE SERPL-SCNC: 101 MMOL/L (ref 96–106)
CO2 SERPL-SCNC: 26 MMOL/L (ref 20–29)
CREAT SERPL-MCNC: 0.93 MG/DL (ref 0.57–1)
EOSINOPHIL # BLD AUTO: 0.3 X10E3/UL (ref 0–0.4)
EOSINOPHIL NFR BLD AUTO: 3 %
ERYTHROCYTE [DISTWIDTH] IN BLOOD BY AUTOMATED COUNT: 14.5 % (ref 11.7–15.4)
GLOBULIN SER CALC-MCNC: 2.6 G/DL (ref 1.5–4.5)
GLUCOSE SERPL-MCNC: 105 MG/DL (ref 65–99)
HCT VFR BLD AUTO: 35.7 % (ref 34–46.6)
HGB BLD-MCNC: 11.7 G/DL (ref 11.1–15.9)
IMM GRANULOCYTES # BLD AUTO: 0 X10E3/UL (ref 0–0.1)
IMM GRANULOCYTES NFR BLD AUTO: 0 %
LYMPHOCYTES # BLD AUTO: 4.4 X10E3/UL (ref 0.7–3.1)
LYMPHOCYTES NFR BLD AUTO: 60 %
MCH RBC QN AUTO: 26.8 PG (ref 26.6–33)
MCHC RBC AUTO-ENTMCNC: 32.8 G/DL (ref 31.5–35.7)
MCV RBC AUTO: 82 FL (ref 79–97)
MONOCYTES # BLD AUTO: 0.6 X10E3/UL (ref 0.1–0.9)
MONOCYTES NFR BLD AUTO: 8 %
NEUTROPHILS # BLD AUTO: 2.2 X10E3/UL (ref 1.4–7)
NEUTROPHILS NFR BLD AUTO: 29 %
PLATELET # BLD AUTO: 266 X10E3/UL (ref 150–450)
POTASSIUM SERPL-SCNC: 4.1 MMOL/L (ref 3.5–5.2)
PROT SERPL-MCNC: 6.8 G/DL (ref 6–8.5)
RBC # BLD AUTO: 4.36 X10E6/UL (ref 3.77–5.28)
SODIUM SERPL-SCNC: 140 MMOL/L (ref 134–144)
URATE SERPL-MCNC: 4.2 MG/DL (ref 3.1–7.9)
WBC # BLD AUTO: 7.5 X10E3/UL (ref 3.4–10.8)

## 2021-10-21 RX ORDER — ALLOPURINOL 300 MG/1
600 TABLET ORAL DAILY
Qty: 180 TABLET | Refills: 1 | Status: SHIPPED | OUTPATIENT
Start: 2021-10-21

## 2021-10-21 NOTE — PROGRESS NOTES
Please call the patient regarding her result. Uric acid 4.2 mg/dL, target is less than 5, but since recent flare, increase dose to 600 mg daily and repeat uric acid in 4 weeks. Remaining labs are good.

## 2021-10-21 NOTE — PROGRESS NOTES
Pt notified of lab results and that she needs to increase her allopurinol to 600mg daily and to have repeat labs done in 1 month. She stated an understanding.

## 2021-12-29 NOTE — DISCHARGE INSTRUCTIONS
DISCHARGE SUMMARY from your Nurse    The following personal items collected during your admission are returned to you:   Dental Appliance: Dental Appliances: Uppers  Vision: Visual Aid: Glasses  Hearing Aid:    Jewelry: Jewelry: None  Clothing: Clothing: Footwear, Shirt, Sweater, Undergarments, Pants  Other Valuables: Other Valuables: None  Valuables sent to safe:      PATIENT INSTRUCTIONS:    After general anesthesia or intravenous sedation, for 24 hours or while taking prescription Narcotics:  · Limit your activities  · Do not drive and operate hazardous machinery  · Do not make important personal or business decisions  · Do  not drink alcoholic beverages  · If you have not urinated within 8 hours after discharge, please contact your surgeon on call. Report the following to your surgeon:  · Excessive pain, swelling, redness or odor of or around the surgical area  · Temperature over 100.5  · Nausea and vomiting lasting longer than 4 hours or if unable to take medications  · Any signs of decreased circulation or nerve impairment to extremity: change in color, persistent  numbness, tingling, coldness or increase pain  · Any questions    COUGH AND DEEP BREATHE    Breathing deep and coughing are very important exercises to do after surgery. Deep breathing and coughing open the little air tubes and air sacks in your lungs. You take deep breaths every day. You may not even notice - it is just something you do when you sigh or yawn. It is a natural exercise you do to keep these air passages open. After surgery, take deep breaths and cough, on purpose. Coughing and deep breathing help prevent bronchitis and pneumonia after surgery. If you had chest or belly surgery, use a pillow as a \"hug buddy\" and hold it tightly to your chest or belly when you cough. DIRECTIONS:  6. Take 10 to 15 slow deep breaths every hour while awake. 7. Breathe in deeply, and hold it for 2 seconds.   8. Exhale slowly through puckered lips, like blowing up a balloon. 9. After every 4th or 5th deep breath, hug your pillow to your chest or belly and give a hard, deep cough. Yes, it will probably hurt. But doing this exercise is very important part of healing after surgery. Take your pain medicine to help you do this exercise without too much pain. IF YOU HAVE BEEN DIAGNOSED WITH SLEEP APNEA, PLEASE USE YOUR SLEEP APNEA DEVICE OR CPAP MACHINE WHEN YOU INTEND TO NAP AFTER TAKING PAIN MEDICATION. Ankle Pumps    Ankle pumps increase the circulation of oxygenated blood to your lower extremities and decrease your risk for circulation problems such as blood clots. They also stretch the muscles, tendons and ligaments in your foot and ankle, and prevent joint contracture in the ankle and foot, especially after surgeries on the legs. It is important to do ankle pump exercises regularly after surgery because immobility increases your risk for developing a blood clot. Your doctor may also have you take an Aspirin for the next few days as well. If your doctor did not ask you to take an Aspirin, consult with him before starting Aspirin therapy on your own. Slowly point your foot forward, feeling the muscles on the top of your lower leg stretch, and hold this position for 5 seconds. Next, pull your foot back toward you as far as possible, stretching the calf muscles, and hold that position for 5 seconds. Repeat with the other foot. Perform 10 repetitions every hour while awake for both ankles if possible (down and then up with the foot once is one repetition). You should feel gentle stretching of the muscles in your lower leg when doing this exercise. If you feel pain, or your range of motion is limited, don't  Push too hard. Only go the limit your joint and muscles will let you go.   If you have increasing pain, progressively worsening leg warmth or swelling, STOP the exercise and call your doctor. Below is information about the medications your doctor is prescribing after your visit:    Other information in your discharge envelope:  []     PRESCRIPTIONS  []     PHYSICAL THERAPY PRESCRIPTION  []     APPOINTMENT CARDS  []     [facility]ional Anesthesia Pamphlet for block or block with On-Q Catheter from Anesthesia Service  []     Medical device information sheets/pamphlets from their    []     School/work excuse note. []     /parent work excuse note. These are general instructions for a healthy lifestyle:    *  Please give a list of your current medications to your Primary Care Provider. *  Please update this list whenever your medications are discontinued, doses are      changed, or new medications (including over-the-counter products) are added. *  Please carry medication information at all times in case of emergency situations. About Smoking  No smoking / No tobacco products / Avoid exposure to second hand smoke    Surgeon General's Warning:  Quitting smoking now greatly reduces serious risk to your health. Obesity, smoking, and sedentary lifestyle greatly increases your risk for illness and disease. A healthy diet, regular physical exercise & weight monitoring are important for maintaining a healthy lifestyle. Congestive Heart Failure  You may be retaining fluid if you have a history of heart failure or if you experience any of the following symptoms:  Weight gain of 3 pounds or more overnight or 5 pounds in a week, increased swelling in our hands or feet or shortness of breath while lying flat in bed. Please call your doctor as soon as you notice any of these symptoms; do not wait until your next office visit.     Recognize signs and symptoms of STROKE:  F - face looks uneven  A - arms unable to move or move even  S - speech slurred or non-existent  T - time-call 911 as soon as signs and symptoms begin-DO NOT go         Back to bed or wait to see if you get better-TIME IS BRAIN. Warning signs of HEART ATTACK  Call 911 if you have these symptoms    · Chest discomfort. Most heart attacks involve discomfort in the center of the chest that lasts more than a few minutes, or that goes away and comes back. It can feel like uncomfortable pressure, squeezing, fullness, or pain. · Discomfort in other areas of the upper body. Symptoms can include pain or discomfort in one or both        Arms, the back, neck, jaw, or stomach. ·  Shortness of breath with or without chest discomfort. · Other signs may include breaking out in a cold sweat, nausea, or lightheadedness    Don't wait more than five minutes to call 911 - MINUTES MATTER! Fast action can save your life. Calling 911 is almost always the fastest way to get lifesaving treatment. Emergency Medical Services staff can begin treatment when they arrive - up to an hour sooner than if someone gets to the hospital by car. [facility]LEX[facility]GRO MEDICATION AND SIDE EFFECT GUIDE    The [employer] MEDICATION AND SIDE EFFECT GUIDE was provided to the PATIENT AND CARE PROVIDER.   Information provided includes instruction about drug purpose and common side effects for the following medications:    · Norco 0

## 2022-01-11 ENCOUNTER — OFFICE VISIT (OUTPATIENT)
Dept: PODIATRY | Age: 77
End: 2022-01-11
Payer: MEDICARE

## 2022-01-11 VITALS
DIASTOLIC BLOOD PRESSURE: 65 MMHG | HEART RATE: 65 BPM | WEIGHT: 177 LBS | BODY MASS INDEX: 31.36 KG/M2 | TEMPERATURE: 97.5 F | HEIGHT: 63 IN | SYSTOLIC BLOOD PRESSURE: 102 MMHG

## 2022-01-11 DIAGNOSIS — L60.0 INGROWN RIGHT GREATER TOENAIL: ICD-10-CM

## 2022-01-11 DIAGNOSIS — E11.42 TYPE 2 DIABETES MELLITUS WITH DIABETIC POLYNEUROPATHY, WITHOUT LONG-TERM CURRENT USE OF INSULIN (HCC): Primary | ICD-10-CM

## 2022-01-11 PROCEDURE — G8427 DOCREV CUR MEDS BY ELIG CLIN: HCPCS | Performed by: PODIATRIST

## 2022-01-11 PROCEDURE — 1090F PRES/ABSN URINE INCON ASSESS: CPT | Performed by: PODIATRIST

## 2022-01-11 PROCEDURE — G8754 DIAS BP LESS 90: HCPCS | Performed by: PODIATRIST

## 2022-01-11 PROCEDURE — G8536 NO DOC ELDER MAL SCRN: HCPCS | Performed by: PODIATRIST

## 2022-01-11 PROCEDURE — G8417 CALC BMI ABV UP PARAM F/U: HCPCS | Performed by: PODIATRIST

## 2022-01-11 PROCEDURE — 99213 OFFICE O/P EST LOW 20 MIN: CPT | Performed by: PODIATRIST

## 2022-01-11 PROCEDURE — G8752 SYS BP LESS 140: HCPCS | Performed by: PODIATRIST

## 2022-01-11 PROCEDURE — G8400 PT W/DXA NO RESULTS DOC: HCPCS | Performed by: PODIATRIST

## 2022-01-11 PROCEDURE — 1101F PT FALLS ASSESS-DOCD LE1/YR: CPT | Performed by: PODIATRIST

## 2022-01-11 PROCEDURE — G8432 DEP SCR NOT DOC, RNG: HCPCS | Performed by: PODIATRIST

## 2022-01-11 NOTE — PROGRESS NOTES
1. Have you been to the ER, urgent care clinic since your last visit? Hospitalized since your last visit? No    2. Have you seen or consulted any other health care providers outside of the [address] [place] [address] since your last visit? Include any pap smears or colon screening.  No     Chief Complaint   Patient presents with    Diabetic Foot Exam     last pcp visit 12/21/2021; last glucose 126; last A1c 8%

## 2022-01-18 NOTE — PROGRESS NOTES
[facility]      HISTORY OF PRESENT ILLNESS  Tee Coombs is a 68 y.o. female is being seen in the office complaining of acute on chronic pain to her big toes and needing a diabetic pedal exam.  Patient states her diabetes has been under control as of late (PCP: Corry Gonzalez MD, last visit 12/21/2021). She describes her diabetes being under control, noting her last hemoglobin A1c was 8%. She is admitting to pain rising level of 3 out of 10 to her big toe joints. She states she is currently under the care of a local rheumatologist for her chronic gout. She denies any trauma to the area. She denies any changes in shoe gear or increase in activity. She states weightbearing exacerbates the pain. She denies any local/systemic signs infection. She denies any other pedal complaints    Review of Systems   Constitutional: Negative. HENT: Negative. Eyes: Negative. Respiratory: Negative. Cardiovascular: Positive for leg swelling. Gastrointestinal: Negative. Genitourinary: Negative. Musculoskeletal: Positive for back pain, joint pain and myalgias. Skin: Negative. Neurological: Positive for sensory change. Endo/Heme/Allergies: Negative. Psychiatric/Behavioral: Positive for depression. The patient is nervous/anxious. All other systems reviewed and are negative.     Past Medical History:   Diagnosis Date    Anxiety     Carpal tunnel syndrome, bilateral     [facility] [address]     Gout     H/O sleep apnea     CPAP    Heart attack ([facility] [address] 1999    Heart disease     High cholesterol     Hypertension     Left anterior fascicular block     Myocardial infarction (HCC)     Obesity, Class I, BMI 30-34.9     Right bundle branch block (RBBB) determined by electrocardiography      Family History   Problem Relation Age of Onset    Cancer Mother     Diabetes Father     Heart Disease Father      Social History     Socioeconomic History    Marital status:  Spouse name: Not on file    Number of children: Not on file    Years of education: Not on file    Highest education level: Not on file   Occupational History    Not on file   Tobacco Use    Smoking status: Never Smoker    Smokeless tobacco: Never Used   Vaping Use    Vaping Use: Never used   Substance and Sexual Activity    Alcohol use: Never    Drug use: Never    Sexual activity: Not on file   Other Topics Concern    Not on file   Social History Narrative    Not on file     Social Determinants of Health     Financial Resource Strain:     Difficulty of Paying Living Expenses: Not on file   Food Insecurity:     Worried About Running Out of Food in the Last Year: Not on file    Chiki of Food in the Last Year: Not on file   Transportation Needs:     Lack of Transportation (Medical): Not on file    Lack of Transportation (Non-Medical):  Not on file   Physical Activity:     Days of Exercise per Week: Not on file    Minutes of Exercise per Session: Not on file   Stress:     Feeling of Stress : Not on file   Social Connections:     Frequency of Communication with Friends and Family: Not on file    Frequency of Social Gatherings with Friends and Family: Not on file    Attends Yazidism Services: Not on file    Active Member of [address] [place] [address] [employer] or Organizations: Not on file    Attends Club or Organization Meetings: Not on file    Marital Status: Not on file   Intimate Partner Violence:     Fear of Current or Ex-Partner: Not on file    Emotionally Abused: Not on file    Physically Abused: Not on file    Sexually Abused: Not on file   Housing Stability:     Unable to Pay for Housing in the Last Year: Not on file    Number of [place] in the Last Year: Not on file    Unstable Housing in the Last Year: Not on file     Past Surgical History:   Procedure Laterality Date    HX CARPAL TUNNEL RELEASE      HX CATARACT REMOVAL Bilateral     HX CORONARY ARTERY BYPASS GRAFT N/A 20 years ago    3 way    HX HYSTERECTOMY N/A     HX LITHOTRIPSY      cholelithotomy     Current Outpatient Medications   Medication Instructions    allopurinoL (ZYLOPRIM) 600 mg, Oral, DAILY    ALPRAZolam (XANAX) 0.25 mg, Oral, DAILY AS NEEDED    amLODIPine (NORVASC) 10 mg, Oral, 7AM    ammonium lactate (LAC-HYDRIN) 12 % lotion APPLY EXTERNALLY TO THE AFFECTED AREA TWICE DAILY    aspirin delayed-release (ECOTRIN) 325 mg, Oral, DAILY    atorvastatin (LIPITOR) 20 mg, Oral, EVERY BEDTIME    carvediloL (COREG) 12.5 mg, Oral, 2 TIMES DAILY    clotrimazole-betamethasone (LOTRISONE) topical cream Topical, 2 TIMES DAILY    colchicine 0.6 mg, Oral, AS NEEDED    cyclobenzaprine (FLEXERIL) 10 mg, Oral, 3 TIMES DAILY AS NEEDED    gabapentin (NEURONTIN) 300 mg, Oral, 3 TIMES DAILY AS NEEDED    hydroCHLOROthiazide (HYDRODIURIL) 25 mg, Oral, DAILY    Lantus Solostar U-100 Insulin 50 Units, SubCUTAneous, 7AM    metFORMIN (GLUCOPHAGE) 1,000 mg, Oral, 2 TIMES DAILY    multivitamin (ONE A DAY) tablet 1 Tablet, Oral, DAILY    telmisartan (MICARDIS) 80 mg, Oral, DAILY    terbinafine HCL (LAMISIL) 250 mg, Oral, DAILY     No Known Allergies    Visit Vitals  /65 (BP 1 Location: Right upper arm, BP Patient Position: Sitting, BP Cuff Size: Adult)   Pulse 65   Temp 97.5 °F (36.4 °C) (Temporal)   Ht 5' 3\" (1.6 m)   Wt 177 lb (80.3 kg)   BMI 31.35 kg/m²     Physical Exam  Vitals reviewed. Constitutional:       Appearance: She is obese. Cardiovascular:      Pulses:           Dorsalis pedis pulses are 2+ on the right side and 2+ on the left side. Posterior tibial pulses are 2+ on the right side and 2+ on the left side. Pulmonary:      Effort: Pulmonary effort is normal.   Musculoskeletal:      Right lower leg: Edema present. Left lower leg: Edema present. Right foot: Normal range of motion. Deformity present. No bunion or Charcot foot. Left foot: Normal range of motion. Deformity present. No bunion or Charcot foot. Feet:      Right foot:      Protective Sensation: 10 sites tested. 0 sites sensed. Skin integrity: Skin integrity normal.      Toenail Condition: Right toenails are abnormally thick and ingrown. Fungal disease present. Left foot:      Protective Sensation: 10 sites tested. 0 sites sensed. Skin integrity: Skin integrity normal.      Toenail Condition: Left toenails are abnormally thick and ingrown. Fungal disease present. Lymphadenopathy:      Lower Body: No right inguinal adenopathy. No left inguinal adenopathy. Skin:     General: Skin is warm. Capillary Refill: Capillary refill takes 2 to 3 seconds. Comments: Absent pedal hair growth with hyperpigmentation noted to the skin   Neurological:      Mental Status: She is alert and oriented to person, place, and time. Comments: Paresthesias noted to bilateral lower extremities   Psychiatric:         Mood and Affect: Affect normal. Mood is anxious. Behavior: Behavior is cooperative. ASSESSMENT and PLAN    ICD-10-CM ICD-9-CM    1. Type 2 diabetes mellitus with diabetic polyneuropathy, without long-term current use of insulin ([facility])  E11.42 250.60      357.2    2. Ingrown right greater toenail  L60.0 703.0            Discussed and educated patient regarding her current medical condition  Instructed patient to monitor their feet daily, be compliant with all medications and wear supportive shoe gear. A slant back procedure was performed to the offending nail border of the right great toe. Patient tolerated well no dressings needed. Instructed patient that if symptoms persist, she may need a permanent solution. Patient verbalized understanding        Jesus Kearney.  Albertina Hammans, [facility], [address] [place] [address] and [facility]  [address] [place] [address]  O: [phone]  F: [phone]  C: [phone]

## 2022-03-18 PROBLEM — M10.9 GOUT: Status: ACTIVE | Noted: 2019-02-22

## 2022-03-18 PROBLEM — L60.3 ONYCHODYSTROPHY: Status: ACTIVE | Noted: 2020-08-20

## 2022-03-19 PROBLEM — L60.0 INGROWN TOENAIL OF BOTH FEET: Status: ACTIVE | Noted: 2020-08-20

## 2022-03-19 PROBLEM — R53.81 DEBILITY: Status: ACTIVE | Noted: 2019-02-22

## 2022-03-19 PROBLEM — M54.9 BACK PAIN: Status: ACTIVE | Noted: 2019-02-22

## 2022-03-19 PROBLEM — I10 HTN (HYPERTENSION): Status: ACTIVE | Noted: 2019-02-22

## 2022-03-20 PROBLEM — E11.42 DIABETIC POLYNEUROPATHY ASSOCIATED WITH TYPE 2 DIABETES MELLITUS (HCC): Status: ACTIVE | Noted: 2020-08-11

## 2022-03-20 PROBLEM — E11.42 TYPE 2 DIABETES MELLITUS WITH DIABETIC POLYNEUROPATHY, WITHOUT LONG-TERM CURRENT USE OF INSULIN (HCC): Status: ACTIVE | Noted: 2021-01-29

## 2022-03-20 PROBLEM — I25.10 CAD (CORONARY ARTERY DISEASE): Status: ACTIVE | Noted: 2019-02-22

## 2022-03-21 ENCOUNTER — TELEPHONE (OUTPATIENT)
Dept: PODIATRY | Age: 77
End: 2022-03-21

## 2022-03-21 DIAGNOSIS — M1A.0790 CHRONIC GOUT OF FOOT, UNSPECIFIED CAUSE, UNSPECIFIED LATERALITY: Primary | ICD-10-CM

## 2022-03-22 ENCOUNTER — HOSPITAL ENCOUNTER (OUTPATIENT)
Dept: GENERAL RADIOLOGY | Age: 77
Discharge: HOME | End: 2022-03-22
Payer: MEDICARE

## 2022-03-22 DIAGNOSIS — M1A.0790 CHRONIC GOUT OF FOOT, UNSPECIFIED CAUSE, UNSPECIFIED LATERALITY: ICD-10-CM

## 2022-03-22 PROCEDURE — 73630 X-RAY EXAM OF FOOT: CPT

## 2022-03-23 ENCOUNTER — OFFICE VISIT (OUTPATIENT)
Dept: PODIATRY | Age: 77
End: 2022-03-23
Payer: MEDICARE

## 2022-03-23 VITALS
SYSTOLIC BLOOD PRESSURE: 132 MMHG | DIASTOLIC BLOOD PRESSURE: 69 MMHG | HEART RATE: 64 BPM | HEIGHT: 63 IN | WEIGHT: 171.2 LBS | TEMPERATURE: 97.8 F | BODY MASS INDEX: 30.33 KG/M2

## 2022-03-23 DIAGNOSIS — M1A.0790 CHRONIC GOUT OF FOOT, UNSPECIFIED CAUSE, UNSPECIFIED LATERALITY: Primary | ICD-10-CM

## 2022-03-23 PROCEDURE — G8400 PT W/DXA NO RESULTS DOC: HCPCS | Performed by: PODIATRIST

## 2022-03-23 PROCEDURE — G8417 CALC BMI ABV UP PARAM F/U: HCPCS | Performed by: PODIATRIST

## 2022-03-23 PROCEDURE — 1090F PRES/ABSN URINE INCON ASSESS: CPT | Performed by: PODIATRIST

## 2022-03-23 PROCEDURE — 99213 OFFICE O/P EST LOW 20 MIN: CPT | Performed by: PODIATRIST

## 2022-03-23 PROCEDURE — G8432 DEP SCR NOT DOC, RNG: HCPCS | Performed by: PODIATRIST

## 2022-03-23 PROCEDURE — G8752 SYS BP LESS 140: HCPCS | Performed by: PODIATRIST

## 2022-03-23 PROCEDURE — G8536 NO DOC ELDER MAL SCRN: HCPCS | Performed by: PODIATRIST

## 2022-03-23 PROCEDURE — G8754 DIAS BP LESS 90: HCPCS | Performed by: PODIATRIST

## 2022-03-23 PROCEDURE — G8427 DOCREV CUR MEDS BY ELIG CLIN: HCPCS | Performed by: PODIATRIST

## 2022-03-23 PROCEDURE — 1101F PT FALLS ASSESS-DOCD LE1/YR: CPT | Performed by: PODIATRIST

## 2022-03-23 NOTE — PROGRESS NOTES
Arthritic changes with heel spurs noted      Anti-inflammatories, custom orthotics and or physical therapy would be the conservative treatment options of choice

## 2022-03-23 NOTE — PROGRESS NOTES
1. \"Have you been to the ER, urgent care clinic since your last visit? Hospitalized since your last visit? \" No    2. \"Have you seen or consulted any other health care providers outside of the [address] [place] [address] since your last visit? \" No     3. For patients aged 39-70: Has the patient had a colonoscopy / FIT/ Cologuard? NA - based on age      If the patient is female:    4. For patients aged 41-77: Has the patient had a mammogram within the past 2 years? NA - based on age or sex      11. For patients aged 21-65: Has the patient had a pap smear?  NA - based on age or sex     Chief Complaint   Patient presents with    Diabetic Foot Exam    Results     x-ray results

## 2022-03-23 NOTE — PROGRESS NOTES
Extensive gouty arthritis noted. Patient to continue with her current allopurinol dosage of 300 mg daily.   Pending repeat uric acid levels

## 2022-04-21 NOTE — PROGRESS NOTES
Gunjan 40      HISTORY OF PRESENT ILLNESS  Shadi Abel is a 68 y.o. female is being seen in the office complaining of bilateral foot pain. Patient states she has gone for x-rays and would like to discuss the findings. She states the pain is intermittent but does rise to the level of 6 out of 10. She states the pain is sharp in nature and exacerbated with any type of weightbearing. She continues deny any recent trauma. She continues to deny any recent changes in her shoe gear. She does admit to a decrease in activity due to the pain. She continues to deny any breaks in skin or local/systemic signs of infection. She continues to deny any other pedal complaints      Review of Systems   Constitutional: Negative. HENT: Negative. Eyes: Negative. Respiratory: Negative. Cardiovascular: Positive for leg swelling. Gastrointestinal: Negative. Genitourinary: Negative. Musculoskeletal: Positive for back pain, joint pain and myalgias. Skin: Negative. Neurological: Positive for sensory change. Endo/Heme/Allergies: Negative. Psychiatric/Behavioral: Positive for depression. The patient is nervous/anxious. All other systems reviewed and are negative.     Past Medical History:   Diagnosis Date    Anxiety     Carpal tunnel syndrome, bilateral     Diabetes ([facility]     Gout     H/O sleep apnea     CPAP    Heart attack ([facility] 1999    Heart disease     High cholesterol     Hypertension     Left anterior fascicular block     Myocardial infarction (HCC)     Obesity, Class I, BMI 30-34.9     Right bundle branch block (RBBB) determined by electrocardiography      Family History   Problem Relation Age of Onset    Cancer Mother     Diabetes Father     Heart Disease Father      Social History     Socioeconomic History    Marital status:      Spouse name: Not on file    Number of children: Not on file    Years of education: Not on file    Highest education level: Not on file   Occupational History    Not on file   Tobacco Use    Smoking status: Never Smoker    Smokeless tobacco: Never Used   Vaping Use    Vaping Use: Never used   Substance and Sexual Activity    Alcohol use: Never    Drug use: Never    Sexual activity: Not on file   Other Topics Concern    Not on file   Social History Narrative    Not on file     Social Determinants of Health     Financial Resource Strain:     Difficulty of Paying Living Expenses: Not on file   Food Insecurity:     Worried About Running Out of Food in the Last Year: Not on file    Chiki of Food in the Last Year: Not on file   Transportation Needs:     Lack of Transportation (Medical): Not on file    Lack of Transportation (Non-Medical):  Not on file   Physical Activity:     Days of Exercise per Week: Not on file    Minutes of Exercise per Session: Not on file   Stress:     Feeling of Stress : Not on file   Social Connections:     Frequency of Communication with Friends and Family: Not on file    Frequency of Social Gatherings with Friends and Family: Not on file    Attends [employer] Services: Not on file    Active Member of [address] [place] [address] or Organizations: Not on file    Attends Club or Organization Meetings: Not on file    Marital Status: Not on file   Intimate Partner Violence:     Fear of Current or Ex-Partner: Not on file    Emotionally Abused: Not on file    Physically Abused: Not on file    Sexually Abused: Not on file   Housing Stability:     Unable to Pay for Housing in the Last Year: Not on file    Number of [place] in the Last Year: Not on file    Unstable Housing in the Last Year: Not on file     Past Surgical History:   Procedure Laterality Date    HX CARPAL TUNNEL RELEASE      HX CATARACT REMOVAL Bilateral     HX CORONARY ARTERY BYPASS GRAFT N/A 20 years ago    3 [address]    HX HYSTERECTOMY N/A     HX LITHOTRIPSY      cholelithotomy     Current Outpatient Medications   Medication Instructions  allopurinoL (ZYLOPRIM) 600 mg, Oral, DAILY    ALPRAZolam (XANAX) 0.25 mg, Oral, DAILY AS NEEDED    amLODIPine (NORVASC) 10 mg, Oral, 7AM    ammonium lactate (LAC-HYDRIN) 12 % lotion APPLY EXTERNALLY TO THE AFFECTED AREA TWICE DAILY    aspirin delayed-release (ECOTRIN) 325 mg, Oral, DAILY    atorvastatin (LIPITOR) 20 mg, Oral, EVERY BEDTIME    carvediloL (COREG) 12.5 mg, Oral, 2 TIMES DAILY    clotrimazole-betamethasone (LOTRISONE) topical cream Topical, 2 TIMES DAILY    colchicine 0.6 mg, Oral, AS NEEDED    cyclobenzaprine (FLEXERIL) 10 mg, Oral, 3 TIMES DAILY AS NEEDED    gabapentin (NEURONTIN) 300 mg, Oral, 3 TIMES DAILY AS NEEDED    hydroCHLOROthiazide (HYDRODIURIL) 25 mg, Oral, DAILY    Lantus Solostar U-100 Insulin 50 Units, SubCUTAneous, 7AM    metFORMIN (GLUCOPHAGE) 1,000 mg, Oral, 2 TIMES DAILY    multivitamin (ONE A DAY) tablet 1 Tablet, Oral, DAILY    telmisartan (MICARDIS) 80 mg, Oral, DAILY    terbinafine HCL (LAMISIL) 250 mg, Oral, DAILY     No Known Allergies    Visit Vitals  /69 (BP 1 Location: Left upper arm, BP Patient Position: Sitting, BP Cuff Size: Adult)   Pulse 64   Temp 97.8 °F (36.6 °C) (Temporal)   Ht 5' 3\" (1.6 m)   Wt 171 lb 3.2 oz (77.7 kg)   BMI 30.33 kg/m²     Physical Exam  Vitals reviewed. Constitutional:       Appearance: She is obese. Cardiovascular:      Pulses:           Dorsalis pedis pulses are 2+ on the right side and 2+ on the left side. Posterior tibial pulses are 2+ on the right side and 2+ on the left side. Pulmonary:      Effort: Pulmonary effort is normal.   Musculoskeletal:      Right lower leg: Edema present. Left lower leg: Edema present. Right foot: Normal range of motion. Deformity present. No bunion or Charcot foot. Left foot: Normal range of motion. Deformity present. No bunion or Charcot foot. Feet:      Right foot:      Protective Sensation: 10 sites tested. 0 sites sensed.       Skin integrity: Skin integrity normal.      Toenail Condition: Right toenails are abnormally thick and ingrown. Fungal disease present. Left foot:      Protective Sensation: 10 sites tested. 0 sites sensed. Skin integrity: Skin integrity normal.      Toenail Condition: Left toenails are abnormally thick and ingrown. Fungal disease present. Lymphadenopathy:      Lower Body: No right inguinal adenopathy. No left inguinal adenopathy. Skin:     General: Skin is warm. Capillary Refill: Capillary refill takes 2 to 3 seconds. Comments: Absent pedal hair growth with hyperpigmentation noted to the skin   Neurological:      Mental Status: She is alert and oriented to person, place, and time. Comments: Paresthesias noted to bilateral lower extremities   Psychiatric:         Mood and Affect: Affect normal. Mood is anxious. Behavior: Behavior is cooperative. DIAGNOSTICS  Right foot, 3 views     Midfoot and interphalangeal joint space narrowing. Articular sclerosis. Osteoarthritis. No acute fracture or dislocation. Dorsal and plantar calcaneal spurs without definite erosion. Left foot, 3 views     Midfoot and interphalangeal joint space narrowing. Bone erosions; these are most conspicuous involving metatarsal bones. Notable  site distal portion remodeled left fifth metatarsal bone. Dorsal and plantar  calcaneal spurs. No acute fracture or dislocation.     IMPRESSION  Evidence for inflammatory arthritis/gout superimposed upon  osteoarthritis. ASSESSMENT and PLAN    ICD-10-CM ICD-9-CM    1. Chronic gout of foot, unspecified cause, unspecified laterality  M1A.0790 274.02 URIC ACID      CANCELED: GLUCOSE-6-PHOSPHATE DEHYDROGENASE           Discussed and educated patient regarding her current medical condition  Personally reviewed x-ray images of the bilateral feet and discussed findings with patient. Treatment options discussed, conservative versus procedure.   Possible complications of each reviewed. Lab work given for serum uric acid and G6PD. Patient to discuss with family regarding possible initiation of Krystexxa infusions        Km Garcia, [facility], [address] [place] [address] and Karyn [address] [place] [address]  O: [phone]  F: [phone]  C: [phone]

## 2022-04-23 LAB — URATE SERPL-MCNC: 2.1 MG/DL (ref 3.1–7.9)

## 2022-04-25 NOTE — PROGRESS NOTES
Patient's uric acid is now low. She is currently taking 600 mg of allopurinol.   She may decrease to 300 mg daily and we will then reevaluate with repeat lab work

## 2022-04-26 DIAGNOSIS — M1A.0790 CHRONIC GOUT OF FOOT, UNSPECIFIED CAUSE, UNSPECIFIED LATERALITY: Primary | ICD-10-CM

## 2022-06-09 ENCOUNTER — OFFICE VISIT (OUTPATIENT)
Dept: PODIATRY | Age: 77
End: 2022-06-09
Payer: MEDICARE

## 2022-06-09 VITALS
TEMPERATURE: 97.7 F | DIASTOLIC BLOOD PRESSURE: 78 MMHG | HEIGHT: 63 IN | HEART RATE: 72 BPM | BODY MASS INDEX: 30.33 KG/M2 | SYSTOLIC BLOOD PRESSURE: 129 MMHG | OXYGEN SATURATION: 98 %

## 2022-06-09 DIAGNOSIS — M1A.0790 CHRONIC GOUT OF FOOT, UNSPECIFIED CAUSE, UNSPECIFIED LATERALITY: ICD-10-CM

## 2022-06-09 DIAGNOSIS — E11.42 TYPE 2 DIABETES MELLITUS WITH DIABETIC POLYNEUROPATHY, WITHOUT LONG-TERM CURRENT USE OF INSULIN (HCC): ICD-10-CM

## 2022-06-09 DIAGNOSIS — L60.0 INGROWN TOENAIL OF BOTH FEET: Primary | ICD-10-CM

## 2022-06-09 PROCEDURE — 1090F PRES/ABSN URINE INCON ASSESS: CPT | Performed by: PODIATRIST

## 2022-06-09 PROCEDURE — G8432 DEP SCR NOT DOC, RNG: HCPCS | Performed by: PODIATRIST

## 2022-06-09 PROCEDURE — 1101F PT FALLS ASSESS-DOCD LE1/YR: CPT | Performed by: PODIATRIST

## 2022-06-09 PROCEDURE — G8536 NO DOC ELDER MAL SCRN: HCPCS | Performed by: PODIATRIST

## 2022-06-09 PROCEDURE — G8400 PT W/DXA NO RESULTS DOC: HCPCS | Performed by: PODIATRIST

## 2022-06-09 PROCEDURE — 1123F ACP DISCUSS/DSCN MKR DOCD: CPT | Performed by: PODIATRIST

## 2022-06-09 PROCEDURE — 99213 OFFICE O/P EST LOW 20 MIN: CPT | Performed by: PODIATRIST

## 2022-06-09 PROCEDURE — G8754 DIAS BP LESS 90: HCPCS | Performed by: PODIATRIST

## 2022-06-09 PROCEDURE — G8427 DOCREV CUR MEDS BY ELIG CLIN: HCPCS | Performed by: PODIATRIST

## 2022-06-09 PROCEDURE — G8752 SYS BP LESS 140: HCPCS | Performed by: PODIATRIST

## 2022-06-09 PROCEDURE — G8417 CALC BMI ABV UP PARAM F/U: HCPCS | Performed by: PODIATRIST

## 2022-06-09 NOTE — PROGRESS NOTES
1. Have you been to the ER, urgent care clinic since your last visit? Hospitalized since your last visit? No    2. Have you seen or consulted any other health care providers outside of the [address] [place] [address] since your last visit? Include any pap smears or colon screening.  No     Chief Complaint   Patient presents with    Foot Exam

## 2022-07-07 ENCOUNTER — TELEPHONE (OUTPATIENT)
Dept: PODIATRY | Age: 77
End: 2022-07-07

## 2022-07-07 NOTE — TELEPHONE ENCOUNTER
Spoke with Ms. Stephen regarding the Krystexxa infusions. She states that she would like to hold off for right now. She is about to undergo a heart catherization by Dr. Kori Ray and she wants to make sure everything is fine with her heart before she proceeds with the infusions.

## 2022-07-15 NOTE — PROGRESS NOTES
Gunjan 40      HISTORY OF PRESENT ILLNESS  Katelynn Quinn is a 68 y.o. female is being seen in the office complaining of bilateral foot pain. Patient states she has gone for x-rays and would like to discuss the findings. She states the pain is intermittent but does rise to the level of 6 out of 10. She states the pain is sharp in nature and exacerbated with any type of weightbearing. She continues deny any recent trauma. She continues to deny any recent changes in her shoe gear. She does admit to a decrease in activity due to the pain. She continues to deny any breaks in skin or local/systemic signs of infection. She continues to deny any other pedal complaints      Review of Systems   Constitutional: Negative. HENT: Negative. Eyes: Negative. Respiratory: Negative. Cardiovascular: Positive for leg swelling. Gastrointestinal: Negative. Genitourinary: Negative. Musculoskeletal: Positive for back pain, joint pain and myalgias. Skin: Negative. Neurological: Positive for sensory change. Endo/Heme/Allergies: Negative. Psychiatric/Behavioral: Positive for depression. The patient is nervous/anxious. All other systems reviewed and are negative.     Past Medical History:   Diagnosis Date    Anxiety     Carpal tunnel syndrome, bilateral     Diabetes ([facility]     Gout     H/O sleep apnea     CPAP    Heart attack ([facility] 1999    Heart disease     High cholesterol     Hypertension     Left anterior fascicular block     Myocardial infarction (HCC)     Obesity, Class I, BMI 30-34.9     Right bundle branch block (RBBB) determined by electrocardiography      Family History   Problem Relation Age of Onset    Cancer Mother     Diabetes Father     Heart Disease Father      Social History     Socioeconomic History    Marital status:      Spouse name: Not on file    Number of children: Not on file    Years of education: Not on file    Highest education level: Not on file   Occupational History    Not on file   Tobacco Use    Smoking status: Never Smoker    Smokeless tobacco: Never Used   Vaping Use    Vaping Use: Never used   Substance and Sexual Activity    Alcohol use: Never    Drug use: Never    Sexual activity: Not on file   Other Topics Concern    Not on file   Social History Narrative    Not on file     Social Determinants of Health     Financial Resource Strain:     Difficulty of Paying Living Expenses: Not on file   Food Insecurity:     Worried About Running Out of Food in the Last Year: Not on file    Chiki of Food in the Last Year: Not on file   Transportation Needs:     Lack of Transportation (Medical): Not on file    Lack of Transportation (Non-Medical):  Not on file   Physical Activity:     Days of Exercise per Week: Not on file    Minutes of Exercise per Session: Not on file   Stress:     Feeling of Stress : Not on file   Social Connections:     Frequency of Communication with Friends and Family: Not on file    Frequency of Social Gatherings with Friends and Family: Not on file    Attends Ca[employer] Services: Not on file    Active Member of [address] [place] [address] or Organizations: Not on file    Attends Club or Organization Meetings: Not on file    Marital Status: Not on file   Intimate Partner Violence:     Fear of Current or Ex-Partner: Not on file    Emotionally Abused: Not on file    Physically Abused: Not on file    Sexually Abused: Not on file   Housing Stability:     Unable to Pay for Housing in the Last Year: Not on file    Number of [place] in the Last Year: Not on file    Unstable Housing in the Last Year: Not on file     Past Surgical History:   Procedure Laterality Date    HX CARPAL TUNNEL RELEASE      HX CATARACT REMOVAL Bilateral     HX CORONARY ARTERY BYPASS GRAFT N/A 20 years ago    3 way    HX HYSTERECTOMY N/A     HX LITHOTRIPSY      cholelithotomy     Current Outpatient Medications   Medication Instructions  allopurinoL (ZYLOPRIM) 600 mg, Oral, DAILY    ALPRAZolam (XANAX) 0.25 mg, Oral, DAILY AS NEEDED    amLODIPine (NORVASC) 10 mg, Oral, 7AM    ammonium lactate (LAC-HYDRIN) 12 % lotion APPLY EXTERNALLY TO THE AFFECTED AREA TWICE DAILY    aspirin delayed-release (ECOTRIN) 325 mg, Oral, DAILY    atorvastatin (LIPITOR) 20 mg, Oral, EVERY BEDTIME    carvediloL (COREG) 12.5 mg, Oral, 2 TIMES DAILY    clotrimazole-betamethasone (LOTRISONE) topical cream Topical, 2 TIMES DAILY    colchicine 0.6 mg, Oral, AS NEEDED    cyclobenzaprine (FLEXERIL) 10 mg, Oral, 3 TIMES DAILY AS NEEDED    gabapentin (NEURONTIN) 300 mg, Oral, 3 TIMES DAILY AS NEEDED    hydroCHLOROthiazide (HYDRODIURIL) 25 mg, Oral, DAILY    Lantus Solostar U-100 Insulin 50 Units, SubCUTAneous, 7AM    metFORMIN (GLUCOPHAGE) 1,000 mg, Oral, 2 TIMES DAILY    multivitamin (ONE A DAY) tablet 1 Tablet, Oral, DAILY    telmisartan (MICARDIS) 80 mg, Oral, DAILY    terbinafine HCL (LAMISIL) 250 mg, Oral, DAILY     No Known Allergies    Visit Vitals  /78 (BP 1 Location: Left upper arm, BP Patient Position: Sitting, BP Cuff Size: Adult)   Pulse 72   Temp 97.7 °F (36.5 °C) (Temporal)   Ht 5' 3\" (1.6 m)   SpO2 98%   BMI 30.33 kg/m²     Physical Exam  Vitals reviewed. Constitutional:       Appearance: She is obese. Cardiovascular:      Pulses:           Dorsalis pedis pulses are 2+ on the right side and 2+ on the left side. Posterior tibial pulses are 2+ on the right side and 2+ on the left side. Pulmonary:      Effort: Pulmonary effort is normal.   Musculoskeletal:      Right lower leg: Edema present. Left lower leg: Edema present. Right foot: Normal range of motion. Deformity present. No bunion or Charcot foot. Left foot: Normal range of motion. Deformity present. No bunion or Charcot foot. Feet:      Right foot:      Protective Sensation: 10 sites tested. 0 sites sensed.       Skin integrity: Skin integrity normal. Toenail Condition: Right toenails are abnormally thick and ingrown. Fungal disease present. Left foot:      Protective Sensation: 10 sites tested. 0 sites sensed. Skin integrity: Skin integrity normal.      Toenail Condition: Left toenails are abnormally thick and ingrown. Fungal disease present. Lymphadenopathy:      Lower Body: No right inguinal adenopathy. No left inguinal adenopathy. Skin:     General: Skin is warm. Capillary Refill: Capillary refill takes 2 to 3 seconds. Comments: Absent pedal hair growth with hyperpigmentation noted to the skin   Neurological:      Mental Status: She is alert and oriented to person, place, and time. Comments: Paresthesias noted to bilateral lower extremities   Psychiatric:         Mood and Affect: Affect normal. Mood is anxious. Behavior: Behavior is cooperative. DIAGNOSTICS  Right foot, 3 views     Midfoot and interphalangeal joint space narrowing. Articular sclerosis. Osteoarthritis. No acute fracture or dislocation. Dorsal and plantar calcaneal spurs without definite erosion. Left foot, 3 views     Midfoot and interphalangeal joint space narrowing. Bone erosions; these are most conspicuous involving metatarsal bones. Notable  site distal portion remodeled left fifth metatarsal bone. Dorsal and plantar  calcaneal spurs. No acute fracture or dislocation.     IMPRESSION  Evidence for inflammatory arthritis/gout superimposed upon  osteoarthritis. ASSESSMENT and PLAN    ICD-10-CM ICD-9-CM    1. Ingrown toenail of both feet  L60.0 703.0    2. Type 2 diabetes mellitus with diabetic polyneuropathy, without long-term current use of insulin ([facility])  E11.42 250.60      357.2    3.  Chronic gout of foot, unspecified cause, unspecified laterality  M1A.0790 274.02            Discussed and educated patient regarding her current medical condition  Discussed and educated patient regarding their current medical condition at it pertains to her diabetes and her overall pedal condition. Instructed patient to monitor their feet daily for possible wound formation, be compliant with all medications and wear supportive shoe gear for wound prevention. Reviewed and discussed most recent lab work, specifically as it pertains to her diabetes. Again, personally reviewed x-ray images of the bilateral feet and discussed findings with patient. Treatment options discussed, conservative versus procedure. Possible complications of each reviewed. Lab work given for serum uric acid and G6PD. Patient would like to initiate Krystexxa infusions with [facility]. Referral sent  Slant back procedures performed to the offending nail borders of both great toenails. Patient tolerated well. Instructed that if these symptoms persist, she may need a more permanent procedure. Patient verbalized understanding          Daryl Pacheco.  Noemi Lopez, [facility], [address] [place] [address] and [facility]  [address] [place] [address] [address], [address]  O: [phone]  F: [phone]  C: [phone]

## 2022-07-19 ENCOUNTER — HOSPITAL ENCOUNTER (OUTPATIENT)
Age: 77
Setting detail: OUTPATIENT SURGERY
Discharge: HOME | End: 2022-07-19
Attending: INTERNAL MEDICINE | Admitting: INTERNAL MEDICINE
Payer: MEDICARE

## 2022-07-19 VITALS
RESPIRATION RATE: 16 BRPM | HEIGHT: 63 IN | OXYGEN SATURATION: 99 % | BODY MASS INDEX: 30.83 KG/M2 | TEMPERATURE: 98.6 F | SYSTOLIC BLOOD PRESSURE: 120 MMHG | WEIGHT: 174 LBS | HEART RATE: 55 BPM | DIASTOLIC BLOOD PRESSURE: 58 MMHG

## 2022-07-19 DIAGNOSIS — R94.39 ABNORMAL BALLISTOCARDIOGRAM: ICD-10-CM

## 2022-07-19 LAB
ACT BLD: 214 SECS (ref 79–138)
ACT BLD: 242 SECS (ref 79–138)
GLUCOSE BLD STRIP.AUTO-MCNC: 84 MG/DL (ref 65–117)
SERVICE CMNT-IMP: NORMAL

## 2022-07-19 PROCEDURE — C1769 GUIDE WIRE: HCPCS | Performed by: INTERNAL MEDICINE

## 2022-07-19 PROCEDURE — C1725 CATH, TRANSLUMIN NON-LASER: HCPCS | Performed by: INTERNAL MEDICINE

## 2022-07-19 PROCEDURE — 74011000250 HC RX REV CODE- 250: Performed by: INTERNAL MEDICINE

## 2022-07-19 PROCEDURE — 74011250636 HC RX REV CODE- 250/636: Performed by: INTERNAL MEDICINE

## 2022-07-19 PROCEDURE — 82962 GLUCOSE BLOOD TEST: CPT

## 2022-07-19 PROCEDURE — 74011000636 HC RX REV CODE- 636: Performed by: INTERNAL MEDICINE

## 2022-07-19 PROCEDURE — 77030013715 HC INFL SYS MRTM -B: Performed by: INTERNAL MEDICINE

## 2022-07-19 PROCEDURE — 99152 MOD SED SAME PHYS/QHP 5/>YRS: CPT | Performed by: INTERNAL MEDICINE

## 2022-07-19 PROCEDURE — 93455 CORONARY ART/GRFT ANGIO S&I: CPT | Performed by: INTERNAL MEDICINE

## 2022-07-19 PROCEDURE — C1894 INTRO/SHEATH, NON-LASER: HCPCS | Performed by: INTERNAL MEDICINE

## 2022-07-19 PROCEDURE — 99153 MOD SED SAME PHYS/QHP EA: CPT | Performed by: INTERNAL MEDICINE

## 2022-07-19 PROCEDURE — 77030039046 HC PAD DEFIB RADIOTRNSPNT CNMD -B: Performed by: INTERNAL MEDICINE

## 2022-07-19 PROCEDURE — 77030041063 HC CATH DX ANGI DXTRTY MEDT -A: Performed by: INTERNAL MEDICINE

## 2022-07-19 PROCEDURE — 85347 COAGULATION TIME ACTIVATED: CPT

## 2022-07-19 PROCEDURE — 77030012468 HC VLV BLEEDBK CNTRL ABBT -B: Performed by: INTERNAL MEDICINE

## 2022-07-19 PROCEDURE — 76937 US GUIDE VASCULAR ACCESS: CPT | Performed by: INTERNAL MEDICINE

## 2022-07-19 PROCEDURE — 77030040934 HC CATH DIAG DXTERITY MEDT -A: Performed by: INTERNAL MEDICINE

## 2022-07-19 PROCEDURE — 92920 PRQ TRLUML C ANGIOP 1ART&/BR: CPT | Performed by: INTERNAL MEDICINE

## 2022-07-19 PROCEDURE — 77030013744: Performed by: INTERNAL MEDICINE

## 2022-07-19 PROCEDURE — C1887 CATHETER, GUIDING: HCPCS | Performed by: INTERNAL MEDICINE

## 2022-07-19 PROCEDURE — C1760 CLOSURE DEV, VASC: HCPCS | Performed by: INTERNAL MEDICINE

## 2022-07-19 RX ORDER — FENTANYL CITRATE 50 UG/ML
INJECTION, SOLUTION INTRAMUSCULAR; INTRAVENOUS AS NEEDED
Status: DISCONTINUED | OUTPATIENT
Start: 2022-07-19 | End: 2022-07-19 | Stop reason: HOSPADM

## 2022-07-19 RX ORDER — SODIUM CHLORIDE 9 MG/ML
1.5 INJECTION, SOLUTION INTRAVENOUS CONTINUOUS
Status: DISPENSED | OUTPATIENT
Start: 2022-07-19 | End: 2022-07-19

## 2022-07-19 RX ORDER — MIDAZOLAM HYDROCHLORIDE 1 MG/ML
INJECTION, SOLUTION INTRAMUSCULAR; INTRAVENOUS AS NEEDED
Status: DISCONTINUED | OUTPATIENT
Start: 2022-07-19 | End: 2022-07-19 | Stop reason: HOSPADM

## 2022-07-19 RX ORDER — ACETAMINOPHEN 325 MG/1
650 TABLET ORAL
Status: DISCONTINUED | OUTPATIENT
Start: 2022-07-19 | End: 2022-07-19 | Stop reason: HOSPADM

## 2022-07-19 RX ORDER — HEPARIN SODIUM 200 [USP'U]/100ML
INJECTION, SOLUTION INTRAVENOUS
Status: COMPLETED | OUTPATIENT
Start: 2022-07-19 | End: 2022-07-19

## 2022-07-19 RX ORDER — SODIUM CHLORIDE 9 MG/ML
3 INJECTION, SOLUTION INTRAVENOUS CONTINUOUS
Status: DISPENSED | OUTPATIENT
Start: 2022-07-19 | End: 2022-07-19

## 2022-07-19 RX ORDER — LIDOCAINE HYDROCHLORIDE 10 MG/ML
INJECTION INFILTRATION; PERINEURAL AS NEEDED
Status: DISCONTINUED | OUTPATIENT
Start: 2022-07-19 | End: 2022-07-19 | Stop reason: HOSPADM

## 2022-07-19 RX ORDER — HEPARIN SODIUM 1000 [USP'U]/ML
INJECTION, SOLUTION INTRAVENOUS; SUBCUTANEOUS AS NEEDED
Status: DISCONTINUED | OUTPATIENT
Start: 2022-07-19 | End: 2022-07-19 | Stop reason: HOSPADM

## 2022-07-19 RX ORDER — SODIUM CHLORIDE 9 MG/ML
500 INJECTION, SOLUTION INTRAVENOUS CONTINUOUS
Status: DISCONTINUED | OUTPATIENT
Start: 2022-07-19 | End: 2022-07-19

## 2022-07-19 RX ADMIN — SODIUM CHLORIDE 3 ML/KG/HR: 9 INJECTION, SOLUTION INTRAVENOUS at 10:58

## 2022-07-19 NOTE — Clinical Note
Multiple views of the internal mammary artery to the left anterior descending and diagonal obtained using power injection.

## 2022-07-19 NOTE — Clinical Note
TRANSFER - IN REPORT:     Verbal report received from: RT Leighann(R) . Report consisted of patient's Situation, Background, Assessment and   Recommendations(SBAR). Opportunity for questions and clarification was provided. Assessment completed upon patient's arrival to unit and care assumed. Patient transported with a Cardiac Cath Tech / Patient Care Tech.

## 2022-07-19 NOTE — Clinical Note
Sheath #1: Sheath: inserted. Sheath inserted/placed in the right femoral  artery. Hemostasis not achieved. Upon evaluation of the common femoral artery stick using fluoroscopy, the access site puncture was within the safe zone.

## 2022-07-19 NOTE — DISCHARGE INSTRUCTIONS
Patient Discharge Instructions    Darryle Schlatter / [id number] : 1945    Admitted 2022 Discharged: 2022     Take Home Medications       · It is important that you take the medication exactly as they are prescribed. · Keep your medication in the bottles provided by the pharmacist and keep a list of the medication names, dosages, and times to be taken in your wallet. · Do not take other medications without consulting your doctor. · Do not take Metformin until Thursday, 22      BRING ALL OF YOUR MEDICINES TO YOUR OFFICE VISIT with Dr. Salena Gotti. Follow-up with Annette Banegas MD in 2 weeks. The office will call to arrange your appointment. Cardiac Catheterization  Discharge Instructions     Do not drive, operate any machinery, or sign any legal documents for 24 hours after your procedure. You must have someone to drive you home.  You may take a shower 24 hours after your cardiac catheterization. Be sure to get the dressing wet and then remove it; gently wash the area with warm soapy water. Pat dry and leave open to air. To help prevent infections, be sure to keep the cath site clean and dry. No lotions, creams, powders, ointments, etc. in the cath site for approximately 1 week.  Do not take a tub bath, get in a hot tub or swimming pool for approximately 5 days or until the cath site is completely healed.  No strenuous activity or heavy lifting over 10 lbs. for 7 days.  Drink plenty of fluids for 24-48 hours after your cath to flush the contrast dye from your kidneys. No alcoholic beverages for 24 hours. You may resume your previous diet (low fat, low cholesterol) after your cath.  After your cath, some bruising or discomfort is common during the healing process. Tylenol, 1-2 tablets every 6 hours as needed, is recommended if you experience any discomfort.   If you experience any signs or symptoms of infection such as fever, chills, or poorly healing incision, persistent tenderness or swelling in the groin, redness and/or warmth to the touch, numbness, significant tingling or pain at the groin site or affected extremity, rash, drainage from the cath site, or if the leg feels tight or swollen, call your physician right away.  If bleeding at the cath site occurs, take a clean gauze pad and apply direct pressure to the groin just above the puncture site. Call 911 immediately, and continue to apply direct pressure until an ambulance gets to your location.  You may return to work  2  days after your cardiac cath if no groin bleeding. Information obtained by :  I understand that if any problems occur once I am at home I am to contact my physician. I understand and acknowledge receipt of the instructions indicated above.                                                                                                                                            R.N.'s Signature                                                                  Date/Time                                                                                                                                              Patient or Representative Signature                                                          Date/Time      Penny Everett MD

## 2022-07-19 NOTE — PROGRESS NOTES
Cardiac Cath Lab Recovery Arrival Note:      Sean Tavera arrived to Cardiac Cath Lab, Recovery Area. Staff introduced to patient. Patient identifiers verified with NAME and DATE OF BIRTH. Procedure verified with patient. Consent forms reviewed and signed by patient or authorized representative and verified. Allergies verified. Patient and family oriented to department. Patient and family informed of procedure and plan of care. Questions answered with review. Patient prepped for procedure, per orders from physician, prior to arrival.    Patient on cardiac monitor, non-invasive blood pressure, SPO2 monitor. On room air. Patient is A&Ox 4. Patient reports no pain. Patient in stretcher, in low position, with side rails up, call bell within reach, patient instructed to call if assistance as needed. Patient prep in: [address], [place] [address]. Patient family has pager # [phone]  Family in: [phone].    Prep by: SARAH

## 2022-07-19 NOTE — PROGRESS NOTES
TRANSFER - IN REPORT:    Verbal report received from [facility] on Darryle Schlatter  being received from cardiac catheterization for routine progression of care. Report consisted of patients Situation, Background, Assessment and Recommendations(SBAR). Information from the following report(s) Procedure Summary was reviewed with the receiving clinician. Opportunity for questions and clarification was provided. Assessment completed upon patients arrival to [address] [place] [address] and care assumed. Cardiac Cath Lab Recovery Arrival Note:    Darryle Schlatter arrived to [facility] recovery area. Patient procedure= cardiac catheterization. Patient on cardiac monitor, non-invasive blood pressure, SPO2 monitor. On room air. IV  of Normal saline on pump at 118 ml/hr. Patient status doing well without problems. Patient is A&Ox 4. Patient reports no pain. PROCEDURE SITE CHECK:    Procedure site:without any bleeding and no hematoma, no pain/discomfort reported at procedure site. No change in patient status. Continue to monitor patient and status. 1400 [facility] Dr. Salena Gotti in and spoke with the patient. 1415 Patient eating lunch and tolerating it well. No complaints of nausea. 1515 Noted a little oozing at right groin site; site redressed with a quik clot and tegederm. Will continue to watch. Did not get patient up to the bathroom due to the oozing. Patient put on bedpan and voided a large amount of urine. 1630 Right groin with no more oozing noted. Patient assisted with dressing and ambulated to the bathroom with some assistance and using her cane. 1640 Patient discharged via wheelchair to home accompanied by two sons.

## 2022-07-20 ENCOUNTER — TRANSCRIBE ORDER (OUTPATIENT)
Dept: CARDIAC REHAB | Age: 77
End: 2022-07-20

## 2022-07-20 DIAGNOSIS — I25.10 ATHEROSCLEROSIS OF NATIVE CORONARY ARTERY, UNSPECIFIED WHETHER ANGINA PRESENT, UNSPECIFIED WHETHER NATIVE OR TRANSPLANTED HEART: Primary | ICD-10-CM

## 2022-08-13 NOTE — H&P
ID note for cellulitis    Subjective: New patient to me.  Right lower extremity continues to be painful with movement.  He is mildly confused.  He is having fevers or chills or night sweats    Objective:   Temp:  [97.5 °F (36.4 °C)-99.5 °F (37.5 °C)] 99.5 °F (37.5 °C)  Heart Rate:  [54-63] 63  Resp:  [16-17] 16  BP: (125-160)/(48-93) 160/66  No acute distress, significant confluent erythema the right lower extremity with decrease edema.  Ruptured bulla but no purulence he is mildly confused and tends to confabulate.    8/11 blood cultures 1/2 Enterobacter  8/12 blood cultures 2/2 pending  CRP 26.8, procalcitonin 6.5  White count 8.27, hemoglobin 9.7, platelets 124  Creatinine 1.45    Assessment and plan  Enterobacter septicemia  Right lower extremity cellulitis  Chronic kidney disease stage IIIb    Continue cefepime 2 g IV every 12 hours as dosed for renal dysfunction.  Anticipate discharge on oral antibiotics once improving.  Repeat blood cultures so far no growth to date.  He is afebrile with a normal white count and I told him I think which is taken some time for the leg infection to resolve given the severity/extent of it.     [facility]    Hospitalist Admission Note      NAME:  Herbie Gaston   :   1945   MRN:  [id number]     PCP:  Breezy Cruz MD     Date/Time:  2019 3:28 AM          Subjective:     CHIEF COMPLAINT: Back pain     HISTORY OF PRESENT ILLNESS:     Ms. Edie Weiner is a 68 y.o.  female with a hx of DM2, HTN, CAD s/p CABG, gout who presented to the Emergency Department complaining of sudden onset of lower back pain. Back pain started after reaching out for an object. 10/10 in severity, constant since onset, worse with moving, better with sitting, tried heat therapy and tylenol with minimal improvement. No trauma to area. No saddle anesthesia or loss of bowel/bladder function. Normally functional, independent, and living alone. Transported via EMS to ED where imaging was negative, had pain with walking and required assistance. We will admit for further work-up and management. Past Med Hx:  - CAD  - gout  - dm2  - htn    Past surgical Hx:  - 3v cabg   - no orthopedic hx    Social hx  - no IVDA    No family hx of early cardiovascular disease    Family hx cannot be fully assessed, due to the admitting conditions    No Known Allergies     Prior to Admission medications    Medication Sig Start Date End Date Taking?  Authorizing Provider   carvedilol (COREG) 12.5 mg tablet  18   Provider, Historical   amLODIPine (NORVASC) 10 mg tablet  19   Provider, Historical   atorvastatin (LIPITOR) 20 mg tablet  18   Provider, Historical   colchicine 0.6 mg tablet  18   Provider, Historical   LANTUS SOLOSTAR U-100 INSULIN 100 unit/mL (3 mL) inpn  18   Provider, Historical   indomethacin (INDOCIN) 50 mg capsule  19   Provider, Historical   glipiZIDE (GLUCOTROL) 5 mg tablet  18   Provider, Historical   diazePAM (VALIUM) 5 mg tablet Sign MRI consent for first.  Then take 1 tab, 20 minutes before MRI test.  For anxiety-claustrophobic 19   Rik Rivero, MD       Review of Systems:  (bold if positive, if negative)    Gen:  Eyes:  ENT:  CVS:  Pulm:  GI:    :    MS:  Pain, weaknessSkin:  Psych:  Endo:    Hem:  Renal:    Neuro:        Objective:      VITALS:    Vital signs reviewed; most recent are:    Visit Vitals  /65 (BP 1 Location: Right arm, BP Patient Position: At rest)   Pulse 64   Temp 98.9 °F (37.2 °C)   Resp 17   Ht 5' 3\" (1.6 m)   Wt 86.2 kg (190 lb)   SpO2 98%   BMI 33.66 kg/m²     SpO2 Readings from Last 6 Encounters:   02/21/19 98%   02/12/19 97%        No intake or output data in the 24 hours ending 02/22/19 0328     Exam:     Physical Exam:    Gen:  Well-developed, well-nourished, in mild acute distress  HEENT:  Pink conjunctivae, PERRL, hearing intact to voice, moist mucous membranes  Neck:  Supple, without masses, thyroid non-tender  Resp:  No accessory muscle use, clear breath sounds without wheezes rales or rhonchi  Card:  No murmurs, normal S1, S2 without thrills, bruits or peripheral edema  Abd:  Soft, non-tender, non-distended, normoactive bowel sounds are present, no palpable organomegaly and no detectable hernias  Lymph:  No cervical or inguinal adenopathy  Musc:  Not assessed due to pain  Skin:  No rashes or ulcers, skin turgor is good  Neuro:  Cranial nerves are grossly intact, no focal motor weakness, follows commands appropriately  Psych:  Good insight, oriented to person, place and time, alert     Labs:    No results for input(s): WBC, HGB, HCT, PLT, HGBEXT, HCTEXT, PLTEXT in the last 72 hours. No results for input(s): NA, K, CL, CO2, GLU, BUN, CREA, CA, MG, PHOS, ALB, TBIL, TBILI, SGOT, ALT in the last 72 hours. No results found for: GLUCPOC  No results for input(s): PH, PCO2, PO2, HCO3, FIO2 in the last 72 hours. No results for input(s): INR in the last 72 hours.     No lab exists for component: INREXT  All Micro Results     Procedure Component Value Units Date/Time    URINE CULTURE HOLD SAMPLE [[id number]]     Order Status: Sent Specimen:  Urine           I have reviewed previous records       Assessment and Plan:      Principal Problem/Active Problems[de-identified]    Back pain / Debility. POA. Non-traumatic. She does not meet any inpatient criteria. This is likely acute muscle spasm. Checking for occult fx with CT scan. Pain control. PT/OT consult. Hypertension. BP okay. Continue home anti-hypertensives once confirmed by pharmacy    CAD / Hx of CABG. Stable. No CP. Cont cardiac meds once confirmed by pharmacy    Gout. No active gout at present.     Telemetry reviewed:   normal sinus rhythm    Risk of deterioration: medium      Total time spent with patient: 48 [address]95 [address] discussed with: Patient, Family and Nursing Staff    Discussed:  Care Plan and D/C Planning       ___________________________________________________    Attending Physician: Hitesh Wilcox DO

## 2022-08-16 ENCOUNTER — OFFICE VISIT (OUTPATIENT)
Dept: PODIATRY | Age: 77
End: 2022-08-16
Payer: MEDICARE

## 2022-08-16 VITALS
HEART RATE: 74 BPM | HEIGHT: 63 IN | SYSTOLIC BLOOD PRESSURE: 132 MMHG | DIASTOLIC BLOOD PRESSURE: 73 MMHG | TEMPERATURE: 96.6 F | BODY MASS INDEX: 30.82 KG/M2 | OXYGEN SATURATION: 99 %

## 2022-08-16 DIAGNOSIS — L60.0 INGROWN TOENAIL OF BOTH FEET: ICD-10-CM

## 2022-08-16 DIAGNOSIS — M1A.0790 CHRONIC GOUT OF FOOT, UNSPECIFIED CAUSE, UNSPECIFIED LATERALITY: ICD-10-CM

## 2022-08-16 DIAGNOSIS — E11.42 TYPE 2 DIABETES MELLITUS WITH DIABETIC POLYNEUROPATHY, WITHOUT LONG-TERM CURRENT USE OF INSULIN (HCC): Primary | ICD-10-CM

## 2022-08-16 PROCEDURE — G8432 DEP SCR NOT DOC, RNG: HCPCS | Performed by: PODIATRIST

## 2022-08-16 PROCEDURE — G8536 NO DOC ELDER MAL SCRN: HCPCS | Performed by: PODIATRIST

## 2022-08-16 PROCEDURE — G8400 PT W/DXA NO RESULTS DOC: HCPCS | Performed by: PODIATRIST

## 2022-08-16 PROCEDURE — 1090F PRES/ABSN URINE INCON ASSESS: CPT | Performed by: PODIATRIST

## 2022-08-16 PROCEDURE — G8752 SYS BP LESS 140: HCPCS | Performed by: PODIATRIST

## 2022-08-16 PROCEDURE — 99213 OFFICE O/P EST LOW 20 MIN: CPT | Performed by: PODIATRIST

## 2022-08-16 PROCEDURE — G8754 DIAS BP LESS 90: HCPCS | Performed by: PODIATRIST

## 2022-08-16 PROCEDURE — 1101F PT FALLS ASSESS-DOCD LE1/YR: CPT | Performed by: PODIATRIST

## 2022-08-16 PROCEDURE — G8417 CALC BMI ABV UP PARAM F/U: HCPCS | Performed by: PODIATRIST

## 2022-08-16 PROCEDURE — 1123F ACP DISCUSS/DSCN MKR DOCD: CPT | Performed by: PODIATRIST

## 2022-08-16 PROCEDURE — G8427 DOCREV CUR MEDS BY ELIG CLIN: HCPCS | Performed by: PODIATRIST

## 2022-08-16 NOTE — PROGRESS NOTES
1. \"Have you been to the ER, urgent care clinic since your last visit? Hospitalized since your last visit? \" No    2. \"Have you seen or consulted any other health care providers outside of the [address] [place] [address] since your last visit? \" No     3. For patients aged 39-70: Has the patient had a colonoscopy / FIT/ Cologuard? NA - based on age      If the patient is female:    4. For patients aged 41-77: Has the patient had a mammogram within the past 2 years? NA - based on age or sex      11. For patients aged 21-65: Has the patient had a pap smear?  NA - based on age or sex    Chief Complaint   Patient presents with    Foot Exam

## 2022-08-20 DIAGNOSIS — I25.10 ATHEROSCLEROSIS OF NATIVE CORONARY ARTERY, UNSPECIFIED WHETHER ANGINA PRESENT, UNSPECIFIED WHETHER NATIVE OR TRANSPLANTED HEART: ICD-10-CM

## 2022-09-14 NOTE — PROGRESS NOTES
[address] [place] [address]      HISTORY OF PRESENT ILLNESS  Janeth Trinh is a 68 y.o. female is being seen in the office complaining of bilateral foot pain. Patient states she has gone for x-rays and would like to discuss the findings. She states the pain is intermittent but does rise to the level of 6 out of 10. She states the pain is sharp in nature and exacerbated with any type of weightbearing. She continues deny any recent trauma. She continues to deny any recent changes in her shoe gear. She does admit to a decrease in activity due to the pain. She continues to deny any breaks in skin or local/systemic signs of infection. She continues to deny any other pedal complaints      Review of Systems   Constitutional: Negative. HENT: Negative. Eyes: Negative. Respiratory: Negative. Cardiovascular: Positive for leg swelling. Gastrointestinal: Negative. Genitourinary: Negative. Musculoskeletal: Positive for back pain, joint pain and myalgias. Skin: Negative. Neurological: Positive for sensory change. Endo/Heme/Allergies: Negative. Psychiatric/Behavioral: Positive for depression. The patient is nervous/anxious. All other systems reviewed and are negative.     Past Medical History:   Diagnosis Date    Anxiety     Carpal tunnel syndrome, bilateral     Diabetes ([facility] [address]     Gout     H/O sleep apnea     CPAP    Heart attack ([facility] [address] 1999    Heart disease     High cholesterol     Hypertension     Left anterior fascicular block     Myocardial infarction (HCC)     Obesity, Class I, BMI 30-34.9     Right bundle branch block (RBBB) determined by electrocardiography      Family History   Problem Relation Age of Onset    Cancer Mother     Diabetes Father     Heart Disease Father      Social History     Socioeconomic History    Marital status:      Spouse name: Not on file    Number of children: Not on file    Years of education: Not on file    Highest education level: Not on file Occupational History    Not on file   Tobacco Use    Smoking status: Never    Smokeless tobacco: Never   Vaping Use    Vaping Use: Never used   Substance and Sexual Activity    Alcohol use: Never    Drug use: Never    Sexual activity: Not on file   Other Topics Concern    Not on file   Social History Narrative    Not on file     Social Determinants of Health     Financial Resource Strain: Not on file   Food Insecurity: Not on file   Transportation Needs: Not on file   Physical Activity: Not on file   Stress: Not on file   Social Connections: Not on file   Intimate Partner Violence: Not on file   Housing Stability: Not on file     Past Surgical History:   Procedure Laterality Date    HX CARPAL TUNNEL RELEASE      HX CATARACT REMOVAL Bilateral     HX CORONARY ARTERY BYPASS GRAFT N/A 20 years ago    [address]    HX HYSTERECTOMY N/A     HX LITHOTRIPSY      cholelithotomy     Current Outpatient Medications   Medication Instructions    allopurinoL (ZYLOPRIM) 600 mg, Oral, DAILY    ALPRAZolam (XANAX) 0.25 mg, Oral, DAILY AS NEEDED    amLODIPine (NORVASC) 10 mg, Oral, 7AM    ammonium lactate (LAC-HYDRIN) 12 % lotion APPLY EXTERNALLY TO THE AFFECTED AREA TWICE DAILY    aspirin delayed-release (ECOTRIN) 325 mg, Oral, DAILY    atorvastatin (LIPITOR) 20 mg, Oral, EVERY BEDTIME    carvediloL (COREG) 12.5 mg, Oral, 2 TIMES DAILY    clotrimazole-betamethasone (LOTRISONE) topical cream Topical, 2 TIMES DAILY    cyclobenzaprine (FLEXERIL) 10 mg, Oral, 3 TIMES DAILY AS NEEDED    hydroCHLOROthiazide (HYDRODIURIL) 25 mg, Oral, DAILY    Lantus Solostar U-100 Insulin 50 Units, SubCUTAneous, 7AM    metFORMIN (GLUCOPHAGE) 1,000 mg, Oral, 2 TIMES DAILY    multivitamin (ONE A DAY) tablet 1 Tablet, Oral, DAILY    telmisartan (MICARDIS) 80 mg, Oral, DAILY     No Known Allergies    Visit Vitals  /73 (BP 1 Location: Right upper arm, BP Patient Position: Sitting, BP Cuff Size: Adult)   Pulse 74   Temp (!) 96.6 °F (35.9 °C) (Temporal)   Ht 5' 3\" (1.6 m)   SpO2 99%   BMI 30.82 kg/m²     Physical Exam  Vitals reviewed. Constitutional:       Appearance: She is obese. Cardiovascular:      Pulses:           Dorsalis pedis pulses are 2+ on the right side and 2+ on the left side. Posterior tibial pulses are 2+ on the right side and 2+ on the left side. Pulmonary:      Effort: Pulmonary effort is normal.   Musculoskeletal:      Right lower leg: Edema present. Left lower leg: Edema present. Right foot: Normal range of motion. Deformity present. No bunion or Charcot foot. Left foot: Normal range of motion. Deformity present. No bunion or Charcot foot. Feet:      Right foot:      Protective Sensation: 10 sites tested. 0 sites sensed. Skin integrity: Skin integrity normal.      Toenail Condition: Right toenails are abnormally thick and ingrown. Fungal disease present. Left foot:      Protective Sensation: 10 sites tested. 0 sites sensed. Skin integrity: Skin integrity normal.      Toenail Condition: Left toenails are abnormally thick and ingrown. Fungal disease present. Lymphadenopathy:      Lower Body: No right inguinal adenopathy. No left inguinal adenopathy. Skin:     General: Skin is warm. Capillary Refill: Capillary refill takes 2 to 3 seconds. Comments: Absent pedal hair growth with hyperpigmentation noted to the skin   Neurological:      Mental Status: She is alert and oriented to person, place, and time. Comments: Paresthesias noted to bilateral lower extremities   Psychiatric:         Mood and Affect: Affect normal. Mood is anxious. Behavior: Behavior is cooperative. DIAGNOSTICS  Right foot, 3 views     Midfoot and interphalangeal joint space narrowing. Articular sclerosis. Osteoarthritis. No acute fracture or dislocation. Dorsal and plantar calcaneal spurs without definite erosion. Left foot, 3 views     Midfoot and interphalangeal joint space narrowing.    Bone erosions; these are most conspicuous involving metatarsal bones. Notable  site distal portion remodeled left fifth metatarsal bone. Dorsal and plantar  calcaneal spurs. No acute fracture or dislocation. IMPRESSION  Evidence for inflammatory arthritis/gout superimposed upon  osteoarthritis. ASSESSMENT and PLAN    ICD-10-CM ICD-9-CM    1. Type 2 diabetes mellitus with diabetic polyneuropathy, without long-term current use of insulin ([facility])  E11.42 250.60      357.2       2. Ingrown toenail of both feet  L60.0 703.0       3. Chronic gout of foot, unspecified cause, unspecified laterality  M1A.0790 274.02                 Discussed and educated patient regarding their current medical condition at it pertains to her diabetes and her overall pedal condition. Instructed patient to monitor their feet daily for possible wound formation, be compliant with all medications and wear supportive shoe gear for wound prevention. Reviewed and discussed most recent lab work, specifically as it pertains to her diabetes. Again, an in-depth conversation was had regarding her chronic gout and various treatment options. Patient states she would like to initiate [facility] in the future but will hold off at this time due to a fear of possible cardiac complications. Reassured patient there is no cardiac complications with this medication but validated her fears  Slant back procedures performed to the offending nail borders of both great toenails. Patient tolerated well. Instructed that if these symptoms persist, she may need a more permanent procedure. Patient verbalized understanding          Steph Mccormick.  Reinaldo Epperson, [address] [facility], [address] [place] [address] and [facility]  [address] [place] [address] [address], [address]  O: [phone]  F: [phone]  C: [phone]

## 2022-10-19 ENCOUNTER — OFFICE VISIT (OUTPATIENT)
Dept: PODIATRY | Age: 77
End: 2022-10-19
Payer: MEDICARE

## 2022-10-19 VITALS
SYSTOLIC BLOOD PRESSURE: 130 MMHG | HEART RATE: 60 BPM | DIASTOLIC BLOOD PRESSURE: 76 MMHG | WEIGHT: 174 LBS | TEMPERATURE: 97.7 F | BODY MASS INDEX: 30.83 KG/M2 | HEIGHT: 63 IN

## 2022-10-19 DIAGNOSIS — E11.42 TYPE 2 DIABETES MELLITUS WITH DIABETIC POLYNEUROPATHY, WITHOUT LONG-TERM CURRENT USE OF INSULIN (HCC): ICD-10-CM

## 2022-10-19 DIAGNOSIS — L60.3 ONYCHODYSTROPHY: ICD-10-CM

## 2022-10-19 DIAGNOSIS — M1A.0790 CHRONIC GOUT OF FOOT, UNSPECIFIED CAUSE, UNSPECIFIED LATERALITY: Primary | ICD-10-CM

## 2022-10-19 DIAGNOSIS — L60.0 INGROWN TOENAIL OF BOTH FEET: ICD-10-CM

## 2022-10-19 PROCEDURE — G8400 PT W/DXA NO RESULTS DOC: HCPCS | Performed by: PODIATRIST

## 2022-10-19 PROCEDURE — 1101F PT FALLS ASSESS-DOCD LE1/YR: CPT | Performed by: PODIATRIST

## 2022-10-19 PROCEDURE — G8432 DEP SCR NOT DOC, RNG: HCPCS | Performed by: PODIATRIST

## 2022-10-19 PROCEDURE — G8417 CALC BMI ABV UP PARAM F/U: HCPCS | Performed by: PODIATRIST

## 2022-10-19 PROCEDURE — G8754 DIAS BP LESS 90: HCPCS | Performed by: PODIATRIST

## 2022-10-19 PROCEDURE — 3078F DIAST BP <80 MM HG: CPT | Performed by: PODIATRIST

## 2022-10-19 PROCEDURE — 3074F SYST BP LT 130 MM HG: CPT | Performed by: PODIATRIST

## 2022-10-19 PROCEDURE — G8536 NO DOC ELDER MAL SCRN: HCPCS | Performed by: PODIATRIST

## 2022-10-19 PROCEDURE — G8752 SYS BP LESS 140: HCPCS | Performed by: PODIATRIST

## 2022-10-19 PROCEDURE — 1090F PRES/ABSN URINE INCON ASSESS: CPT | Performed by: PODIATRIST

## 2022-10-19 PROCEDURE — 99213 OFFICE O/P EST LOW 20 MIN: CPT | Performed by: PODIATRIST

## 2022-10-19 PROCEDURE — 1123F ACP DISCUSS/DSCN MKR DOCD: CPT | Performed by: PODIATRIST

## 2022-10-19 PROCEDURE — G8427 DOCREV CUR MEDS BY ELIG CLIN: HCPCS | Performed by: PODIATRIST

## 2022-10-19 RX ORDER — DICLOFENAC SODIUM 10 MG/G
4 GEL TOPICAL 4 TIMES DAILY
Qty: 350 G | Refills: 2 | Status: SHIPPED | OUTPATIENT
Start: 2022-10-19

## 2022-10-31 NOTE — PROGRESS NOTES
[address]      HISTORY OF PRESENT ILLNESS  Darwin Montaño is a 68 y.o. female is being seen in the office complaining of bilateral foot pain. She states the pain is intermittent but does rise to the level of 3 out of 10. She states the pain is sharp in nature and exacerbated with any type of weightbearing. She continues deny any recent trauma. She continues to deny any recent changes in her shoe gear. She does admit to a decrease in activity due to the pain. She continues to deny any breaks in skin or local/systemic signs of infection. She continues to deny any other pedal complaints    Review of Systems   Constitutional: Negative. HENT: Negative. Eyes: Negative. Respiratory: Negative. Cardiovascular: Positive for leg swelling. Gastrointestinal: Negative. Genitourinary: Negative. Musculoskeletal: Positive for back pain, joint pain and myalgias. Skin: Negative. Neurological: Positive for sensory change. Endo/Heme/Allergies: Negative. Psychiatric/Behavioral: Positive for depression. The patient is nervous/anxious. All other systems reviewed and are negative.     Past Medical History:   Diagnosis Date    Anxiety     Carpal tunnel syndrome, bilateral     Diabetes ([facility] [address]     Gout     H/O sleep apnea     CPAP    Heart attack ([facility] [address] 1999    Heart disease     High cholesterol     Hypertension     Left anterior fascicular block     Myocardial infarction (HCC)     Obesity, Class I, BMI 30-34.9     Right bundle branch block (RBBB) determined by electrocardiography      Family History   Problem Relation Age of Onset    Cancer Mother     Diabetes Father     Heart Disease Father      Social History     Socioeconomic History    Marital status:      Spouse name: Not on file    Number of children: Not on file    Years of education: Not on file    Highest education level: Not on file   Occupational History    Not on file   Tobacco Use    Smoking status: Never Smokeless tobacco: Never   Vaping Use    Vaping Use: Never used   Substance and Sexual Activity    Alcohol use: Never    Drug use: Never    Sexual activity: Not on file   Other Topics Concern    Not on file   Social History Narrative    Not on file     Social Determinants of Health     Financial Resource Strain: Not on file   Food Insecurity: Not on file   Transportation Needs: Not on file   Physical Activity: Not on file   Stress: Not on file   Social Connections: Not on file   Intimate Partner Violence: Not on file   Housing Stability: Not on file     Past Surgical History:   Procedure Laterality Date    HX CARPAL TUNNEL RELEASE      HX CATARACT REMOVAL Bilateral     HX CORONARY ARTERY BYPASS GRAFT N/A 20 years ago    [address]    HX HYSTERECTOMY N/A     HX LITHOTRIPSY      cholelithotomy     Current Outpatient Medications   Medication Instructions    allopurinoL (ZYLOPRIM) 600 mg, Oral, DAILY    ALPRAZolam (XANAX) 0.25 mg, Oral, DAILY AS NEEDED    amLODIPine (NORVASC) 10 mg, Oral, 7AM    ammonium lactate (LAC-HYDRIN) 12 % lotion APPLY EXTERNALLY TO THE AFFECTED AREA TWICE DAILY    aspirin delayed-release (ECOTRIN) 325 mg, Oral, DAILY    atorvastatin (LIPITOR) 20 mg, Oral, EVERY BEDTIME    carvediloL (COREG) 12.5 mg, Oral, 2 TIMES DAILY    clotrimazole-betamethasone (LOTRISONE) topical cream Topical, 2 TIMES DAILY    cyclobenzaprine (FLEXERIL) 10 mg, Oral, 3 TIMES DAILY AS NEEDED    diclofenac (VOLTAREN) 4 g, Topical, 4 TIMES DAILY    hydroCHLOROthiazide (HYDRODIURIL) 25 mg, Oral, DAILY    Lantus Solostar U-100 Insulin 50 Units, SubCUTAneous, 7AM    metFORMIN (GLUCOPHAGE) 1,000 mg, Oral, 2 TIMES DAILY    multivitamin (ONE A DAY) tablet 1 Tablet, Oral, DAILY    telmisartan (MICARDIS) 80 mg, Oral, DAILY     No Known Allergies    Visit Vitals  /76 (BP 1 Location: Right upper arm, BP Patient Position: Sitting, BP Cuff Size: Large adult)   Pulse 60   Temp 97.7 °F (36.5 °C) (Temporal)   Ht 5' 3\" (1.6 m)   Wt 174 lb (78.9 kg)   BMI 30.82 kg/m²     Physical Exam  Vitals reviewed. Constitutional:       Appearance: She is obese. Cardiovascular:      Pulses:           Dorsalis pedis pulses are 2+ on the right side and 2+ on the left side. Posterior tibial pulses are 2+ on the right side and 2+ on the left side. Pulmonary:      Effort: Pulmonary effort is normal.   Musculoskeletal:      Right lower leg: Edema present. Left lower leg: Edema present. Right foot: Normal range of motion. Deformity present. No bunion or Charcot foot. Left foot: Normal range of motion. Deformity present. No bunion or Charcot foot. Feet:      Right foot:      Protective Sensation: 10 sites tested. 0 sites sensed. Skin integrity: Skin integrity normal.      Toenail Condition: Right toenails are abnormally thick and ingrown. Fungal disease present. Left foot:      Protective Sensation: 10 sites tested. 0 sites sensed. Skin integrity: Skin integrity normal.      Toenail Condition: Left toenails are abnormally thick and ingrown. Fungal disease present. Lymphadenopathy:      Lower Body: No right inguinal adenopathy. No left inguinal adenopathy. Skin:     General: Skin is warm. Capillary Refill: Capillary refill takes 2 to 3 seconds. Comments: Absent pedal hair growth with hyperpigmentation noted to the skin   Neurological:      Mental Status: She is alert and oriented to person, place, and time. Comments: Paresthesias noted to bilateral lower extremities   Psychiatric:         Mood and Affect: Affect normal. Mood is anxious. Behavior: Behavior is cooperative. ASSESSMENT and PLAN    ICD-10-CM ICD-9-CM    1. Chronic gout of foot, unspecified cause, unspecified laterality  M1A.0790 274.02 URIC ACID      2. Type 2 diabetes mellitus with diabetic polyneuropathy, without long-term current use of insulin ([facility])  E11.42 250.60      357.2       3. Onychodystrophy  L60.3 703.8       4.  Ingrown toenail of both feet  L60.0 703.0                 Discussed and educated patient regarding their current medical condition at it pertains to her diabetes and her overall pedal condition. Instructed patient to monitor their feet daily for possible wound formation, be compliant with all medications and wear supportive shoe gear for wound prevention. Reviewed and discussed most recent lab work, specifically as it pertains to her diabetes. Again, an in-depth conversation was had regarding her chronic gout and various treatment options. Repeat SUA oral given. Also, order given for diclofenac 1% gel to be applied topically for symptomatic relief  Slant back procedures performed to the offending nail borders of both great toenails. Patient tolerated well. Instructed that if these symptoms persist, she may need a more permanent procedure. Patient verbalized understanding          Chryl Schilder.  Parris Owens, [facility], [address] [place] [address] and [facility]  [address] [place] [address]  O: [phone]  F: [phone]  C: [phone]

## 2022-12-19 ENCOUNTER — TELEPHONE (OUTPATIENT)
Dept: PODIATRY | Age: 77
End: 2022-12-19

## 2022-12-19 NOTE — TELEPHONE ENCOUNTER
Patient called and would like to know if Dr. Vita Milligan received the Cardiology Report from where she was in her in October. Patient would like the clinical staff to call her at [phone].

## 2023-01-11 ENCOUNTER — OFFICE VISIT (OUTPATIENT)
Dept: PODIATRY | Age: 78
End: 2023-01-11
Payer: MEDICARE

## 2023-01-11 VITALS
HEART RATE: 61 BPM | DIASTOLIC BLOOD PRESSURE: 63 MMHG | WEIGHT: 173.9 LBS | HEIGHT: 63 IN | BODY MASS INDEX: 30.81 KG/M2 | OXYGEN SATURATION: 97 % | SYSTOLIC BLOOD PRESSURE: 129 MMHG | TEMPERATURE: 97.3 F

## 2023-01-11 DIAGNOSIS — M1A.0790 CHRONIC GOUT OF FOOT, UNSPECIFIED CAUSE, UNSPECIFIED LATERALITY: ICD-10-CM

## 2023-01-11 DIAGNOSIS — E11.42 TYPE 2 DIABETES MELLITUS WITH DIABETIC POLYNEUROPATHY, WITHOUT LONG-TERM CURRENT USE OF INSULIN (HCC): Primary | ICD-10-CM

## 2023-01-11 DIAGNOSIS — L60.3 ONYCHODYSTROPHY: ICD-10-CM

## 2023-01-11 DIAGNOSIS — L60.0 INGROWN TOENAIL OF BOTH FEET: ICD-10-CM

## 2023-01-11 NOTE — PROGRESS NOTES
1. Have you been to the ER, urgent care clinic since your last visit? Hospitalized since your last visit? No    2. Have you seen or consulted any other health care providers outside of the [address] [place] [address] since your last visit? Include any pap smears or colon screening.  No    Chief Complaint   Patient presents with    Foot Exam

## 2023-01-18 PROBLEM — E11.42 DIABETIC POLYNEUROPATHY ASSOCIATED WITH TYPE 2 DIABETES MELLITUS (HCC): Status: RESOLVED | Noted: 2020-08-11 | Resolved: 2023-01-18

## 2023-01-18 NOTE — PROGRESS NOTES
Gunjan 40      HISTORY OF PRESENT ILLNESS  Ariel Soriano is a 68 y.o. female is being seen in the office for a diabetic pedal exam and multiple lower extremity complaints. She states the pain she is suffering in her toes is intermittent but does rise to the level of 3 out of 10. She states the pain is sharp in nature and exacerbated with any type of weightbearing. She continues deny any recent trauma. She continues to deny any recent changes in her shoe gear. She does admit to a decrease in activity due to the pain. She continues to deny any breaks in skin or local/systemic signs of infection. She continues to deny any other pedal complaints    PCP: Khushboo Coley MD  Last PCP Visit: 12/06/2022      Review of Systems   Constitutional: Negative. HENT: Negative. Eyes: Negative. Respiratory: Negative. Cardiovascular: Positive for leg swelling. Gastrointestinal: Negative. Genitourinary: Negative. Musculoskeletal: Positive for back pain, joint pain and myalgias. Skin: Negative. Neurological: Positive for sensory change. Endo/Heme/Allergies: Negative. Psychiatric/Behavioral: Positive for depression. The patient is nervous/anxious. All other systems reviewed and are negative.     Past Medical History:   Diagnosis Date    Anxiety     Carpal tunnel syndrome, bilateral     Diabetes ([facility] [address]     Gout     H/O sleep apnea     CPAP    Heart attack ([facility] [address] 1999    Heart disease     High cholesterol     Hypertension     Left anterior fascicular block     Myocardial infarction (HCC)     Obesity, Class I, BMI 30-34.9     Right bundle branch block (RBBB) determined by electrocardiography      Family History   Problem Relation Age of Onset    Cancer Mother     Diabetes Father     Heart Disease Father      Social History     Socioeconomic History    Marital status:      Spouse name: Not on file    Number of children: Not on file    Years of education: Not on file Highest education level: Not on file   Occupational History    Not on file   Tobacco Use    Smoking status: Never    Smokeless tobacco: Never   Vaping Use    Vaping Use: Never used   Substance and Sexual Activity    Alcohol use: Never    Drug use: Never    Sexual activity: Not on file   Other Topics Concern    Not on file   Social History Narrative    Not on file     Social Determinants of Health     Financial Resource Strain: Not on file   Food Insecurity: Not on file   Transportation Needs: Not on file   Physical Activity: Not on file   Stress: Not on file   Social Connections: Not on file   Intimate Partner Violence: Not on file   Housing Stability: Not on file     Past Surgical History:   Procedure Laterality Date    HX CARPAL TUNNEL RELEASE      HX CATARACT REMOVAL Bilateral     HX CORONARY ARTERY BYPASS GRAFT N/A 20 years ago    [address]    HX HYSTERECTOMY N/A     HX LITHOTRIPSY      cholelithotomy     Current Outpatient Medications   Medication Instructions    allopurinoL (ZYLOPRIM) 600 mg, Oral, DAILY    ALPRAZolam (XANAX) 0.25 mg, Oral, DAILY AS NEEDED    amLODIPine (NORVASC) 10 mg, Oral, 7AM    ammonium lactate (LAC-HYDRIN) 12 % lotion APPLY EXTERNALLY TO THE AFFECTED AREA TWICE DAILY    aspirin delayed-release (ECOTRIN) 325 mg, Oral, DAILY    atorvastatin (LIPITOR) 20 mg, Oral, EVERY BEDTIME    carvediloL (COREG) 12.5 mg, Oral, 2 TIMES DAILY    clotrimazole-betamethasone (LOTRISONE) topical cream Topical, 2 TIMES DAILY    cyclobenzaprine (FLEXERIL) 10 mg, Oral, 3 TIMES DAILY AS NEEDED    diclofenac (VOLTAREN) 4 g, Topical, 4 TIMES DAILY    hydroCHLOROthiazide (HYDRODIURIL) 25 mg, Oral, DAILY    Lantus Solostar U-100 Insulin 50 Units, SubCUTAneous, 7AM    metFORMIN (GLUCOPHAGE) 1,000 mg, Oral, 2 TIMES DAILY    multivitamin (ONE A DAY) tablet 1 Tablet, Oral, DAILY    telmisartan (MICARDIS) 80 mg, Oral, DAILY     No Known Allergies    Visit Vitals  /63 (BP 1 Location: Left upper arm, BP Patient Position: Sitting, BP Cuff Size: Adult)   Pulse 61   Temp 97.3 °F (36.3 °C) (Temporal)   Ht 5' 3\" (1.6 m)   Wt 173 lb 14.4 oz (78.9 kg)   SpO2 97%   BMI 30.81 kg/m²     Physical Exam  Vitals reviewed. Constitutional:       Appearance: She is obese. Cardiovascular:      Pulses:           Dorsalis pedis pulses are 2+ on the right side and 2+ on the left side. Posterior tibial pulses are 2+ on the right side and 2+ on the left side. Pulmonary:      Effort: Pulmonary effort is normal.   Musculoskeletal:      Right lower leg: Edema present. Left lower leg: Edema present. Right foot: Normal range of motion. Deformity present. No bunion or Charcot foot. Left foot: Normal range of motion. Deformity present. No bunion or Charcot foot. Feet:      Right foot:      Protective Sensation: 10 sites tested. 0 sites sensed. Skin integrity: Skin integrity normal.      Toenail Condition: Right toenails are abnormally thick and ingrown. Fungal disease present. Left foot:      Protective Sensation: 10 sites tested. 0 sites sensed. Skin integrity: Skin integrity normal.      Toenail Condition: Left toenails are abnormally thick and ingrown. Fungal disease present. Lymphadenopathy:      Lower Body: No right inguinal adenopathy. No left inguinal adenopathy. Skin:     General: Skin is warm. Capillary Refill: Capillary refill takes 2 to 3 seconds. Comments: Absent pedal hair growth with hyperpigmentation noted to the skin   Neurological:      Mental Status: She is alert and oriented to person, place, and time. Comments: Paresthesias noted to bilateral lower extremities   Psychiatric:         Mood and Affect: Affect normal. Mood is anxious. Behavior: Behavior is cooperative. ASSESSMENT and PLAN    ICD-10-CM ICD-9-CM    1. Type 2 diabetes mellitus with diabetic polyneuropathy, without long-term current use of insulin ([facility])  E11.42 250.60      357.2       2.  Ingrown toenail of both feet  L60.0 703.0       3. Onychodystrophy  L60.3 703.8       4. Chronic gout of foot, unspecified cause, unspecified laterality  M1A.0790 274.02                 Discussed and educated patient regarding their current medical condition at it pertains to her diabetes and her overall pedal condition. Instructed patient to monitor their feet daily for possible wound formation, be compliant with all medications and wear supportive shoe gear for wound prevention. Reviewed and discussed most recent lab work, specifically as it pertains to her diabetes. A sharp toenail plate debridement was performed to all 10 pedal digits with a nail nipper without incident. Patient tolerated well no dressing was needed  Slant back procedures performed to all offending nail borders regarding the ingrown toenails. Instructed patient that she may need more permanent procedures in the future if symptoms persist.    Again, an in-depth conversation was had regarding her chronic gout and various treatment options. Reviewed her most recent serum uric acid and G6PD lab work. Also in-depth review personally performed of her recent cardiology diagnostic testing and subsequent office notes. I again discussed Naoma Flair as a possible treatment option. Personally discussed with her cardiologist's office. The hope would be to move forward with the Krystexxa treatments in the near future. This time she is to continue with her allopurinol 300 mg p.o. daily  Patient verbalized understanding of all discussed and reviewed today in the office          Elza Acosta.  JANEL Grijalva, CWSP, [address] [facility]  [address] [facility], [address] [place] [address]  O: [phone]  F: [phone]    * [employer] available 24/7

## 2023-02-09 DIAGNOSIS — M1A.39X1 CHRONIC GOUT DUE TO RENAL IMPAIRMENT OF MULTIPLE SITES WITH TOPHUS: ICD-10-CM

## 2023-02-09 NOTE — TELEPHONE ENCOUNTER
Kameron MAYES   [phone]    Inquiring to see if there is a stronger dosage of allopurinoL (ZYLOPRIM)     Please advise. .................           Thank you,   Chery Loredo

## 2023-02-14 RX ORDER — ALLOPURINOL 100 MG/1
TABLET ORAL DAILY
Status: CANCELLED | OUTPATIENT
Start: 2023-02-14

## 2023-02-14 RX ORDER — ALLOPURINOL 300 MG/1
600 TABLET ORAL DAILY
Qty: 180 TABLET | Refills: 1 | Status: CANCELLED | OUTPATIENT
Start: 2023-02-14

## 2023-02-15 RX ORDER — ALLOPURINOL 300 MG/1
600 TABLET ORAL DAILY
Qty: 180 TABLET | Refills: 1 | Status: SHIPPED | OUTPATIENT
Start: 2023-02-15

## 2023-03-07 ENCOUNTER — TRANSCRIBE ORDER (OUTPATIENT)
Dept: SCHEDULING | Age: 78
End: 2023-03-07

## 2023-03-07 DIAGNOSIS — Z78.0 MENOPAUSE PRESENT: Primary | ICD-10-CM

## 2023-03-29 ENCOUNTER — OFFICE VISIT (OUTPATIENT)
Dept: PODIATRY | Age: 78
End: 2023-03-29

## 2023-03-29 DIAGNOSIS — E11.42 TYPE 2 DIABETES MELLITUS WITH DIABETIC POLYNEUROPATHY, WITHOUT LONG-TERM CURRENT USE OF INSULIN (HCC): Primary | ICD-10-CM

## 2023-03-29 DIAGNOSIS — M1A.0790 CHRONIC GOUT OF FOOT, UNSPECIFIED CAUSE, UNSPECIFIED LATERALITY: ICD-10-CM

## 2023-03-29 DIAGNOSIS — L60.3 ONYCHODYSTROPHY: ICD-10-CM

## 2023-04-22 DIAGNOSIS — Z78.0 MENOPAUSE PRESENT: Primary | ICD-10-CM

## 2023-04-27 NOTE — PROGRESS NOTES
Gunjan 40      HISTORY OF PRESENT ILLNESS  Derrick Ash is a 66 y.o. female is being seen in the office for a diabetic pedal exam and multiple lower extremity complaints. She states the pain she is suffering in her toes is intermittent but does rise to the level of 3 out of 10. She states the pain is sharp in nature and exacerbated with any type of weightbearing. She continues deny any recent trauma. She continues to deny any recent changes in her shoe gear. She does admit to a decrease in activity due to the pain. She continues to deny any breaks in skin or local/systemic signs of infection. She continues to deny any other pedal complaints    PCP: Frank Mills MD  Last PCP Visit: 12/06/2022      Review of Systems   Constitutional: Negative. HENT: Negative. Eyes: Negative. Respiratory: Negative. Cardiovascular: Positive for leg swelling. Gastrointestinal: Negative. Genitourinary: Negative. Musculoskeletal: Positive for back pain, joint pain and myalgias. Skin: Negative. Neurological: Positive for sensory change. Endo/Heme/Allergies: Negative. Psychiatric/Behavioral: Positive for depression. The patient is nervous/anxious. All other systems reviewed and are negative.     Past Medical History:   Diagnosis Date    Anxiety     Carpal tunnel syndrome, bilateral     Diabetes ([facility] [address]     Gout     H/O sleep apnea     CPAP    Heart attack ([facility] [address] 1999    Heart disease     High cholesterol     Hypertension     Left anterior fascicular block     Myocardial infarction (HCC)     Obesity, Class I, BMI 30-34.9     Right bundle branch block (RBBB) determined by electrocardiography      Family History   Problem Relation Age of Onset    Cancer Mother     Diabetes Father     Heart Disease Father      Social History     Socioeconomic History    Marital status:      Spouse name: Not on file    Number of children: Not on file    Years of education: Not on file Highest education level: Not on file   Occupational History    Not on file   Tobacco Use    Smoking status: Never    Smokeless tobacco: Never   Vaping Use    Vaping Use: Never used   Substance and Sexual Activity    Alcohol use: Never    Drug use: Never    Sexual activity: Not on file   Other Topics Concern    Not on file   Social History Narrative    Not on file     Social Determinants of Health     Financial Resource Strain: Not on file   Food Insecurity: Not on file   Transportation Needs: Not on file   Physical Activity: Not on file   Stress: Not on file   Social Connections: Not on file   Intimate Partner Violence: Not on file   Housing Stability: Not on file     Past Surgical History:   Procedure Laterality Date    HX CARPAL TUNNEL RELEASE      HX CATARACT REMOVAL Bilateral     HX CORONARY ARTERY BYPASS GRAFT N/A 20 years ago    [address]    HX HYSTERECTOMY N/A     HX LITHOTRIPSY      cholelithotomy     Current Outpatient Medications   Medication Instructions    allopurinoL (ZYLOPRIM) 600 mg, Oral, DAILY    ALPRAZolam (XANAX) 0.25 mg, DAILY AS NEEDED    amLODIPine (NORVASC) 10 mg, 7AM    ammonium lactate (LAC-HYDRIN) 12 % lotion APPLY EXTERNALLY TO THE AFFECTED AREA TWICE DAILY    aspirin delayed-release 325 mg, Oral, DAILY    atorvastatin (LIPITOR) 20 mg, EVERY BEDTIME    carvediloL (COREG) 12.5 mg, Oral, 2 TIMES DAILY    clotrimazole-betamethasone (LOTRISONE) topical cream Topical, 2 TIMES DAILY    cyclobenzaprine (FLEXERIL) 10 mg, Oral, 3 TIMES DAILY AS NEEDED    diclofenac (VOLTAREN) 4 g, Topical, 4 TIMES DAILY    hydroCHLOROthiazide (HYDRODIURIL) 25 mg, DAILY    ibuprofen (MOTRIN) 600 mg, Oral, EVERY 6 HOURS AS NEEDED    Lantus Solostar U-100 Insulin 50 Units, SubCUTAneous, 7AM    lidocaine (Lidoderm) 5 % 1 Patch, TransDERmal, EVERY 24 HOURS, Apply patch to the affected area for 12 hours a day and remove for 12 hours a day.     metFORMIN (GLUCOPHAGE) 1,000 mg, 2 TIMES DAILY    methocarbamoL (ROBAXIN) 500 mg, Oral, 4 TIMES DAILY    multivitamin (ONE A DAY) tablet 1 Tablet, Oral, DAILY    telmisartan (MICARDIS) 80 mg, DAILY     No Known Allergies    There were no vitals taken for this visit. Physical Exam  Vitals reviewed. Constitutional:       Appearance: She is obese. Cardiovascular:      Pulses:           Dorsalis pedis pulses are 2+ on the right side and 2+ on the left side. Posterior tibial pulses are 2+ on the right side and 2+ on the left side. Pulmonary:      Effort: Pulmonary effort is normal.   Musculoskeletal:      Right lower leg: Edema present. Left lower leg: Edema present. Right foot: Normal range of motion. Deformity present. No bunion or Charcot foot. Left foot: Normal range of motion. Deformity present. No bunion or Charcot foot. Feet:      Right foot:      Protective Sensation: 10 sites tested. 0 sites sensed. Skin integrity: Skin integrity normal.      Toenail Condition: Right toenails are abnormally thick and ingrown. Fungal disease present. Left foot:      Protective Sensation: 10 sites tested. 0 sites sensed. Skin integrity: Skin integrity normal.      Toenail Condition: Left toenails are abnormally thick and ingrown. Fungal disease present. Lymphadenopathy:      Lower Body: No right inguinal adenopathy. No left inguinal adenopathy. Skin:     General: Skin is warm. Capillary Refill: Capillary refill takes 2 to 3 seconds. Comments: Absent pedal hair growth with hyperpigmentation noted to the skin   Neurological:      Mental Status: She is alert and oriented to person, place, and time. Comments: Paresthesias noted to bilateral lower extremities   Psychiatric:         Mood and Affect: Affect normal. Mood is anxious. Behavior: Behavior is cooperative. ASSESSMENT and PLAN    ICD-10-CM ICD-9-CM    1.  Type 2 diabetes mellitus with diabetic polyneuropathy, without long-term current use of insulin ([facility])  E11.42 250.60 357.2       2. Onychodystrophy  L60.3 703.8       3. Chronic gout of foot, unspecified cause, unspecified laterality  M1A.0790 274.02               Discussed and educated patient regarding their current medical condition at it pertains to her diabetes and her overall pedal condition. Instructed patient to monitor their feet daily for possible wound formation, be compliant with all medications and wear supportive shoe gear for wound prevention. Reviewed and discussed most recent lab work, specifically as it pertains to her diabetes. Slant back procedures performed to all offending nail borders regarding the ingrown toenails. Instructed patient that she may need more permanent procedures in the future if symptoms persist.    Again, an in-depth conversation was had regarding her chronic gout and various treatment options. Reviewed her most recent serum uric acid and G6PD lab work. Also in-depth review personally performed of her recent cardiology diagnostic testing and subsequent office notes. I again discussed Roxanna Hudsons as a possible treatment option. Personally discussed with her cardiologist's office. The hope would be to move forward with the Krystexxa treatments in the near future. This time she is to continue with her allopurinol 300 mg p.o. daily  Patient verbalized understanding of all discussed and reviewed today in the office          Olaf Hope.  JANEL Grijalva, [facility], [address] [facility]  [address] [facility], [address] [place] [address]  O: [phone]  F: [phone]    * [employer] available 24/7

## 2023-05-31 ENCOUNTER — OFFICE VISIT (OUTPATIENT)
Age: 78
End: 2023-05-31
Payer: MEDICARE

## 2023-05-31 VITALS
HEART RATE: 61 BPM | DIASTOLIC BLOOD PRESSURE: 56 MMHG | BODY MASS INDEX: 30.65 KG/M2 | RESPIRATION RATE: 16 BRPM | SYSTOLIC BLOOD PRESSURE: 120 MMHG | WEIGHT: 173 LBS | HEIGHT: 63 IN

## 2023-05-31 DIAGNOSIS — E11.42 TYPE 2 DIABETES MELLITUS WITH DIABETIC POLYNEUROPATHY, WITHOUT LONG-TERM CURRENT USE OF INSULIN (HCC): Primary | ICD-10-CM

## 2023-05-31 PROCEDURE — 1036F TOBACCO NON-USER: CPT | Performed by: PODIATRIST

## 2023-05-31 PROCEDURE — G8400 PT W/DXA NO RESULTS DOC: HCPCS | Performed by: PODIATRIST

## 2023-05-31 PROCEDURE — G8417 CALC BMI ABV UP PARAM F/U: HCPCS | Performed by: PODIATRIST

## 2023-05-31 PROCEDURE — 99213 OFFICE O/P EST LOW 20 MIN: CPT | Performed by: PODIATRIST

## 2023-05-31 PROCEDURE — 3074F SYST BP LT 130 MM HG: CPT | Performed by: PODIATRIST

## 2023-05-31 PROCEDURE — 1123F ACP DISCUSS/DSCN MKR DOCD: CPT | Performed by: PODIATRIST

## 2023-05-31 PROCEDURE — 3078F DIAST BP <80 MM HG: CPT | Performed by: PODIATRIST

## 2023-05-31 PROCEDURE — G8427 DOCREV CUR MEDS BY ELIG CLIN: HCPCS | Performed by: PODIATRIST

## 2023-05-31 PROCEDURE — 1090F PRES/ABSN URINE INCON ASSESS: CPT | Performed by: PODIATRIST

## 2023-08-02 ENCOUNTER — OFFICE VISIT (OUTPATIENT)
Age: 78
End: 2023-08-02

## 2023-08-02 VITALS
HEART RATE: 71 BPM | HEIGHT: 63 IN | WEIGHT: 173 LBS | SYSTOLIC BLOOD PRESSURE: 136 MMHG | BODY MASS INDEX: 30.65 KG/M2 | DIASTOLIC BLOOD PRESSURE: 86 MMHG | RESPIRATION RATE: 16 BRPM

## 2023-08-02 DIAGNOSIS — E11.42 TYPE 2 DIABETES MELLITUS WITH DIABETIC POLYNEUROPATHY, WITHOUT LONG-TERM CURRENT USE OF INSULIN (HCC): Primary | ICD-10-CM

## 2023-08-02 DIAGNOSIS — L60.0 INGROWN TOENAIL OF BOTH FEET: ICD-10-CM

## 2023-09-01 NOTE — PROGRESS NOTES
Progress Notes by Concetta Peraza DPM at 03/29/23 1045                    Author: Concetta Peraza DPM  Service: --  Author Type: Physician       Filed: 04/30/23 1045  Encounter Date: 3/29/2023  Status: Addendum                                     [facility]         HISTORY OF PRESENT ILLNESS   Chapin Cash is a 66 y.o.  female is being seen in the office for a diabetic pedal exam and multiple lower extremity complaints. She states the pain she is suffering in her toes is intermittent but does rise to the level of  3 out of 10. She states the pain is sharp in nature and exacerbated with any type of weightbearing. She continues deny any recent trauma. She continues to deny any recent changes in her shoe gear. She does admit to a decrease in activity due to the  pain. She continues to deny any breaks in skin or local/systemic signs of infection. She continues to deny any other pedal complaints      PCP: Dwaine Martinez MD   Last PCP Visit: 06/06/2023         Review of Systems    Constitutional: Negative. HENT: Negative. Eyes: Negative. Respiratory: Negative. Cardiovascular: Positive for leg swelling. Gastrointestinal: Negative. Genitourinary: Negative. Musculoskeletal: Positive for back pain, joint pain  and myalgias. Skin: Negative. Neurological: Positive for sensory change. Endo/Heme/Allergies: Negative. Psychiatric/Behavioral: Positive for depression. The patient is nervous/anxious . All other systems reviewed and are negative.           Past Medical History:       Diagnosis                                                   Family History        Problem                                  Social History            Socioeconomic History                                          Tobacco Use                          Substance and Sexual Activity                   Other Topics             Social History Narrative

## 2023-10-04 ENCOUNTER — OFFICE VISIT (OUTPATIENT)
Age: 78
End: 2023-10-04
Payer: MEDICARE

## 2023-10-04 VITALS
BODY MASS INDEX: 30.65 KG/M2 | WEIGHT: 173 LBS | DIASTOLIC BLOOD PRESSURE: 58 MMHG | HEIGHT: 63 IN | HEART RATE: 63 BPM | SYSTOLIC BLOOD PRESSURE: 138 MMHG | RESPIRATION RATE: 20 BRPM | OXYGEN SATURATION: 98 %

## 2023-10-04 DIAGNOSIS — L60.0 INGROWN TOENAIL OF BOTH FEET: ICD-10-CM

## 2023-10-04 DIAGNOSIS — E11.42 TYPE 2 DIABETES MELLITUS WITH DIABETIC POLYNEUROPATHY, WITHOUT LONG-TERM CURRENT USE OF INSULIN (HCC): Primary | ICD-10-CM

## 2023-10-04 PROCEDURE — G8428 CUR MEDS NOT DOCUMENT: HCPCS | Performed by: PODIATRIST

## 2023-10-04 PROCEDURE — G8417 CALC BMI ABV UP PARAM F/U: HCPCS | Performed by: PODIATRIST

## 2023-10-04 PROCEDURE — 3074F SYST BP LT 130 MM HG: CPT | Performed by: PODIATRIST

## 2023-10-04 PROCEDURE — 3078F DIAST BP <80 MM HG: CPT | Performed by: PODIATRIST

## 2023-10-04 PROCEDURE — G8400 PT W/DXA NO RESULTS DOC: HCPCS | Performed by: PODIATRIST

## 2023-10-04 PROCEDURE — 1123F ACP DISCUSS/DSCN MKR DOCD: CPT | Performed by: PODIATRIST

## 2023-10-04 PROCEDURE — 99213 OFFICE O/P EST LOW 20 MIN: CPT | Performed by: PODIATRIST

## 2023-10-04 PROCEDURE — G8484 FLU IMMUNIZE NO ADMIN: HCPCS | Performed by: PODIATRIST

## 2023-10-04 PROCEDURE — 1036F TOBACCO NON-USER: CPT | Performed by: PODIATRIST

## 2023-10-04 PROCEDURE — 1090F PRES/ABSN URINE INCON ASSESS: CPT | Performed by: PODIATRIST

## 2023-10-26 NOTE — PROGRESS NOTES
Blood pressure (!) 138/58, pulse 63, resp. rate 20, height 5' 3\" (1.6 m), weight 173 lb (78.5 kg), SpO2 98 %.
ASSESSMENT and PLAN   Diagnosis Orders   1. Type 2 diabetes mellitus with diabetic polyneuropathy, without long-term current use of insulin ([address])        2. Ingrown toenail of both feet                  Discussed and educated patient regarding their current medical condition at it pertains to her diabetes and her overall pedal condition. Instructed patient to monitor their feet daily for possible wound formation, be compliant with all medications  and wear supportive shoe gear for wound prevention. Reviewed and discussed most recent lab work, specifically as it pertains to her diabetes. Slant back procedures performed to all offending nail borders regarding the ingrown toenails. Instructed patient that she may need more permanent procedures in the future if symptoms persist.     Patient verbalized understanding of all discussed and reviewed today in the office              Marbella Cruise.  PATTI Levine, CWSP, [address] [place], [address], [address] [place] [address] Way  O: [phone]  F: [phone]    [facility] [address] [place] [address] Street: [phone]  F: [phone]    [address] [place] [address], [address] [place] [address] Keegan Castro  O: [phone]  F: [phone]    * Available via [facility] 24/7

## 2024-03-07 ENCOUNTER — TELEPHONE (OUTPATIENT)
Age: 79
End: 2024-03-07

## 2024-03-11 ENCOUNTER — OFFICE VISIT (OUTPATIENT)
Age: 79
End: 2024-03-11
Payer: MEDICARE

## 2024-03-11 VITALS
DIASTOLIC BLOOD PRESSURE: 72 MMHG | HEART RATE: 59 BPM | TEMPERATURE: 98.2 F | WEIGHT: 181 LBS | OXYGEN SATURATION: 98 % | HEIGHT: 63 IN | SYSTOLIC BLOOD PRESSURE: 127 MMHG | BODY MASS INDEX: 32.07 KG/M2

## 2024-03-11 DIAGNOSIS — L85.9 HYPERKERATOSIS: ICD-10-CM

## 2024-03-11 DIAGNOSIS — E11.42 TYPE 2 DIABETES MELLITUS WITH DIABETIC POLYNEUROPATHY, WITHOUT LONG-TERM CURRENT USE OF INSULIN (HCC): ICD-10-CM

## 2024-03-11 DIAGNOSIS — L60.3 ONYCHODYSTROPHY: ICD-10-CM

## 2024-03-11 DIAGNOSIS — L03.032 PARONYCHIA OF GREAT TOE OF LEFT FOOT: Primary | ICD-10-CM

## 2024-03-11 DIAGNOSIS — M1A.09X0 CHRONIC GOUT OF MULTIPLE SITES, UNSPECIFIED CAUSE: ICD-10-CM

## 2024-03-11 PROCEDURE — 3078F DIAST BP <80 MM HG: CPT | Performed by: PODIATRIST

## 2024-03-11 PROCEDURE — 1036F TOBACCO NON-USER: CPT | Performed by: PODIATRIST

## 2024-03-11 PROCEDURE — 99214 OFFICE O/P EST MOD 30 MIN: CPT | Performed by: PODIATRIST

## 2024-03-11 PROCEDURE — 1090F PRES/ABSN URINE INCON ASSESS: CPT | Performed by: PODIATRIST

## 2024-03-11 PROCEDURE — 3074F SYST BP LT 130 MM HG: CPT | Performed by: PODIATRIST

## 2024-03-11 PROCEDURE — G8417 CALC BMI ABV UP PARAM F/U: HCPCS | Performed by: PODIATRIST

## 2024-03-11 PROCEDURE — G8400 PT W/DXA NO RESULTS DOC: HCPCS | Performed by: PODIATRIST

## 2024-03-11 PROCEDURE — 1123F ACP DISCUSS/DSCN MKR DOCD: CPT | Performed by: PODIATRIST

## 2024-03-11 PROCEDURE — G8484 FLU IMMUNIZE NO ADMIN: HCPCS | Performed by: PODIATRIST

## 2024-03-11 PROCEDURE — G8427 DOCREV CUR MEDS BY ELIG CLIN: HCPCS | Performed by: PODIATRIST

## 2024-03-11 PROCEDURE — 11721 DEBRIDE NAIL 6 OR MORE: CPT | Performed by: PODIATRIST

## 2024-03-11 RX ORDER — DAPAGLIFLOZIN 10 MG/1
10 TABLET, FILM COATED ORAL EVERY MORNING
COMMUNITY

## 2024-03-11 RX ORDER — CEPHALEXIN 500 MG/1
500 CAPSULE ORAL 3 TIMES DAILY
COMMUNITY
Start: 2024-03-07

## 2024-03-11 NOTE — PROGRESS NOTES
Chief Complaint   Patient presents with    Follow-up    Diabetes     /72 (Site: Left Upper Arm, Position: Sitting, Cuff Size: Large Adult)   Pulse 59   Temp 98.2 °F (36.8 °C) (Temporal)   Ht 1.6 m (5' 3\")   Wt 82.1 kg (181 lb)   SpO2 98%   BMI 32.06 kg/m²     1. Have you been to the ER, urgent care clinic since your last visit?  Hospitalized since your last visit?No    2. Have you seen or consulted any other health care providers outside of the [facility] since your last visit?  Include any pap smears or colon screening. No    
[facility] - [facility]  [address]  [place] [address]  O: [phone]  F: [phone]    [facility]  [address]  [place] [address]  O: [phone]  F: [phone]    * Available via [employer] 24/7

## 2024-04-10 ENCOUNTER — HOSPITAL ENCOUNTER (OUTPATIENT)
Facility: HOSPITAL | Age: 79
Discharge: HOME | End: 2024-04-13
Attending: FAMILY MEDICINE
Payer: MEDICARE

## 2024-04-10 VITALS — HEIGHT: 63 IN | WEIGHT: 181 LBS | BODY MASS INDEX: 32.07 KG/M2

## 2024-04-10 DIAGNOSIS — Z78.0 MENOPAUSE PRESENT: ICD-10-CM

## 2024-04-10 PROCEDURE — 77080 DXA BONE DENSITY AXIAL: CPT

## 2024-05-06 ENCOUNTER — OFFICE VISIT (OUTPATIENT)
Age: 79
End: 2024-05-06

## 2024-05-06 VITALS
BODY MASS INDEX: 32.18 KG/M2 | SYSTOLIC BLOOD PRESSURE: 138 MMHG | HEIGHT: 63 IN | DIASTOLIC BLOOD PRESSURE: 62 MMHG | HEART RATE: 65 BPM | WEIGHT: 181.6 LBS | OXYGEN SATURATION: 99 % | TEMPERATURE: 98.2 F | RESPIRATION RATE: 18 BRPM

## 2024-05-06 DIAGNOSIS — L60.3 ONYCHODYSTROPHY: ICD-10-CM

## 2024-05-06 DIAGNOSIS — M1A.9XX0 CHRONIC GOUT WITHOUT TOPHUS, UNSPECIFIED CAUSE, UNSPECIFIED SITE: ICD-10-CM

## 2024-05-06 DIAGNOSIS — L60.0 INGROWN TOENAIL OF BOTH FEET: ICD-10-CM

## 2024-05-06 DIAGNOSIS — E11.42 TYPE 2 DIABETES MELLITUS WITH DIABETIC POLYNEUROPATHY, WITHOUT LONG-TERM CURRENT USE OF INSULIN (HCC): Primary | ICD-10-CM

## 2024-05-06 RX ORDER — ACETAMINOPHEN 160 MG
TABLET,DISINTEGRATING ORAL
COMMUNITY

## 2024-05-06 RX ORDER — ASPIRIN 81 MG/1
81 TABLET ORAL DAILY
COMMUNITY

## 2024-05-06 NOTE — PROGRESS NOTES
RM 2    I have reviewed all needed preparation for visit and have obtained necessary documentation. Identified pt with two pt identifiers (name and ). All patient medications has been reviewed.    Chief Complaint   Patient presents with    Follow-up    Diabetes     2 month DM foot care;         Vitals:    24 1327   BP: 138/62   Site: Left Upper Arm   Position: Sitting   Cuff Size: Large Adult   Pulse: 65   Resp: 18   Temp: 98.2 °F (36.8 °C)   TempSrc: Temporal   SpO2: 99%   Weight: 82.4 kg (181 lb 9.6 oz)   Height: 1.6 m (5' 3\")        Pain Score:   0 - No pain     Coordination of Care Questionnaire:  :   1. Have you been to the ER, urgent care clinic since your last visit?  Hospitalized since your last visit?No    2. Have you seen or consulted any other health care providers outside of the [facility] since your last visit?  Include any pap smears or colon screening. No    An electronic signature was used to authenticate this note.    --MAURA ADAMS MA

## 2024-05-24 NOTE — PROGRESS NOTES
[facility]         HISTORY OF PRESENT ILLNESS  Yolis Gray is a 79 y.o.  female is being seen in the office for a diabetic pedal exam and multiple lower extremity complaints.  She states the pain she is suffering in her toes is intermittent but does rise to the level of  3 out of 10.  She states the pain is sharp in nature and exacerbated with any type of weightbearing.  She continues deny any recent trauma.  She continues to deny any recent changes in her shoe gear.  She does admit to a decrease in activity due to the  pain.  She continues to deny any breaks in skin or local/systemic signs of infection.  She continues to deny any other pedal complaints      PCP: Kali Dawn MD   Last PCP Visit: 03/06/2024         Review of Systems    Constitutional: Negative.     HENT: Negative.     Eyes: Negative.     Respiratory: Negative.     Cardiovascular: Positive for leg swelling.    Gastrointestinal: Negative.     Genitourinary: Negative.     Musculoskeletal: Positive for back pain, joint pain  and myalgias.    Skin: Negative.     Neurological: Positive for sensory change.    Endo/Heme/Allergies: Negative.     Psychiatric/Behavioral: Positive for depression. The patient is nervous/anxious .     All other systems reviewed and are negative.          Past Medical History:       Diagnosis                                                   Family History        Problem                                  Social History            Socioeconomic History                                          Tobacco Use                          Substance and Sexual Activity                   Other Topics             Social History Narrative                   Social Determinants of Health            Financial Resource Strain: Not on file     Food Insecurity: Not on file     Transportation Needs: Not on file     Physical Activity: Not on file     Stress: Not on file     Social Connections: Not

## 2024-07-15 ENCOUNTER — OFFICE VISIT (OUTPATIENT)
Age: 79
End: 2024-07-15

## 2024-07-15 VITALS
SYSTOLIC BLOOD PRESSURE: 143 MMHG | WEIGHT: 184.6 LBS | HEIGHT: 63 IN | TEMPERATURE: 98.8 F | RESPIRATION RATE: 20 BRPM | HEART RATE: 59 BPM | BODY MASS INDEX: 32.71 KG/M2 | DIASTOLIC BLOOD PRESSURE: 58 MMHG | OXYGEN SATURATION: 96 %

## 2024-07-15 DIAGNOSIS — L60.0 INGROWN TOENAIL OF BOTH FEET: ICD-10-CM

## 2024-07-15 DIAGNOSIS — E11.42 TYPE 2 DIABETES MELLITUS WITH DIABETIC POLYNEUROPATHY, WITHOUT LONG-TERM CURRENT USE OF INSULIN (HCC): Primary | ICD-10-CM

## 2024-07-15 DIAGNOSIS — L60.3 ONYCHODYSTROPHY: ICD-10-CM

## 2024-07-15 DIAGNOSIS — M1A.9XX0 CHRONIC GOUT WITHOUT TOPHUS, UNSPECIFIED CAUSE, UNSPECIFIED SITE: ICD-10-CM

## 2024-07-15 ASSESSMENT — PATIENT HEALTH QUESTIONNAIRE - PHQ9
SUM OF ALL RESPONSES TO PHQ9 QUESTIONS 1 & 2: 0
1. LITTLE INTEREST OR PLEASURE IN DOING THINGS: NOT AT ALL
SUM OF ALL RESPONSES TO PHQ QUESTIONS 1-9: 0
SUM OF ALL RESPONSES TO PHQ QUESTIONS 1-9: 0
2. FEELING DOWN, DEPRESSED OR HOPELESS: NOT AT ALL
SUM OF ALL RESPONSES TO PHQ QUESTIONS 1-9: 0
SUM OF ALL RESPONSES TO PHQ QUESTIONS 1-9: 0

## 2024-07-15 NOTE — PROGRESS NOTES
Identified pt with two pt identifiers(name and ). Reviewed record in preparation for visit and have obtained necessary documentation. All patient medications has been reviewed.  Chief Complaint   Patient presents with    Type 2 diabetes mellitus with diabetic polyneuropathy, with       Vitals:    07/15/24 1311   BP: (!) 143/58   Pulse: 59   Resp: 20   Temp: 98.8 °F (37.1 °C)   SpO2: 96%                   Coordination of Care Questionnaire:   1) Have you been to an emergency room, urgent care, or hospitalized since your last visit?   no       2. Have seen or consulted any other health care provider since your last visit? no        Patient is accompanied by self I have received verbal consent from Yolis Gray to discuss any/all medical information while they are present in the room.

## 2024-07-19 NOTE — PROGRESS NOTES
[facility] PODIATRY & FOOT SURGERY         HISTORY OF PRESENT ILLNESS  Yolis Gray is a 79 y.o.  female is being seen in the office for a diabetic pedal exam and multiple lower extremity complaints.  She states the pain she is suffering in her toes is intermittent but does rise to the level of  3 out of 10.  She states the pain is sharp in nature and exacerbated with any type of weightbearing.  She continues deny any recent trauma.  She continues to deny any recent changes in her shoe gear.  She does admit to a decrease in activity due to the  pain.  She continues to deny any breaks in skin or local/systemic signs of infection.  She continues to deny any other pedal complaints      PCP: Kali Dawn MD   Last PCP Visit: 03/06/2024         Review of Systems    Constitutional: Negative.     HENT: Negative.     Eyes: Negative.     Respiratory: Negative.     Cardiovascular: Positive for leg swelling.    Gastrointestinal: Negative.     Genitourinary: Negative.     Musculoskeletal: Positive for back pain, joint pain  and myalgias.    Skin: Negative.     Neurological: Positive for sensory change.    Endo/Heme/Allergies: Negative.     Psychiatric/Behavioral: Positive for depression. The patient is nervous/anxious .     All other systems reviewed and are negative.          Past Medical History:       Diagnosis                                                   Family History        Problem                                  Social History            Socioeconomic History                                          Tobacco Use                          Substance and Sexual Activity                   Other Topics             Social History Narrative                   Social Determinants of Health            Financial Resource Strain: Not on file     Food Insecurity: Not on file     Transportation Needs: Not on file     Physical Activity: Not on file     Stress: Not on file     Social Connections: Not

## 2025-08-04 ENCOUNTER — HOSPITAL ENCOUNTER (EMERGENCY)
Facility: HOSPITAL | Age: 80
Discharge: HOME | End: 2025-08-04
Attending: EMERGENCY MEDICINE
Payer: MEDICARE

## 2025-08-04 VITALS
OXYGEN SATURATION: 100 % | HEIGHT: 63 IN | WEIGHT: 183 LBS | TEMPERATURE: 98.4 F | DIASTOLIC BLOOD PRESSURE: 67 MMHG | HEART RATE: 62 BPM | SYSTOLIC BLOOD PRESSURE: 103 MMHG | RESPIRATION RATE: 16 BRPM | BODY MASS INDEX: 32.43 KG/M2

## 2025-08-04 DIAGNOSIS — T63.484A INSECT STINGS, UNDETERMINED INTENT, INITIAL ENCOUNTER: Primary | ICD-10-CM

## 2025-08-04 PROCEDURE — 99283 EMERGENCY DEPT VISIT LOW MDM: CPT

## 2025-08-04 PROCEDURE — 6370000000 HC RX 637 (ALT 250 FOR IP): Performed by: PHYSICIAN ASSISTANT

## 2025-08-04 RX ORDER — LIDOCAINE 4 G/G
2 PATCH TOPICAL DAILY
Status: DISCONTINUED | OUTPATIENT
Start: 2025-08-04 | End: 2025-08-04 | Stop reason: HOSPADM

## 2025-08-04 RX ORDER — MUPIROCIN 2 %
OINTMENT (GRAM) TOPICAL
Qty: 15 G | Refills: 0 | Status: SHIPPED | OUTPATIENT
Start: 2025-08-04 | End: 2025-08-11

## 2025-08-04 RX ORDER — LIDOCAINE 40 MG/G
CREAM TOPICAL PRN
Status: DISCONTINUED | OUTPATIENT
Start: 2025-08-04 | End: 2025-08-04 | Stop reason: HOSPADM

## 2025-08-04 RX ADMIN — LIDOCAINE: 40 CREAM TOPICAL at 15:18

## 2025-08-07 DIAGNOSIS — E11.42 TYPE 2 DIABETES MELLITUS WITH DIABETIC POLYNEUROPATHY, WITHOUT LONG-TERM CURRENT USE OF INSULIN (HCC): Primary | ICD-10-CM

## (undated) DEVICE — RUNTHROUGH NS EXTRA FLOPPY PTCA GUIDEWIRE: Brand: RUNTHROUGH

## (undated) DEVICE — WASTE KIT - ST MARY: Brand: MEDLINE INDUSTRIES, INC.

## (undated) DEVICE — KIT HND CTRL 3 W STPCOCK ROT END 54IN PREM HI PRSS TBNG AT

## (undated) DEVICE — CATH GUID COR JR4.0S 6FR 100CM -- LAUNCHER

## (undated) DEVICE — SPONGE GZ W4XL4IN COT 12 PLY TYP VII WVN C FLD DSGN

## (undated) DEVICE — CATHETER DIAG 5FR L100CM SPEC IMA CRV SZ DBL BRAID WIRE SFT

## (undated) DEVICE — ANGIOGRAPHY KIT

## (undated) DEVICE — CATHETER GUID 6FR L100CM DIA0.071IN NYL SHFT RBU4.0 W/O

## (undated) DEVICE — RADIFOCUS GLIDEWIRE: Brand: GLIDEWIRE

## (undated) DEVICE — KIT COR DIAG CATH ANGIO 5FR CURVES JL 4.0 100CM JR 4.0

## (undated) DEVICE — STERILE POLYISOPRENE POWDER-FREE SURGICAL GLOVES WITH EMOLLIENT COATING: Brand: PROTEXIS

## (undated) DEVICE — KIT MED IMAG CNTRST AGNT W/ IOPAMIDOL REUSE

## (undated) DEVICE — GUIDEWIRE VASC L260CM DIA0.035IN TIP L3MM STD EXCHG PTFE J

## (undated) DEVICE — COVER LT HNDL PLAS RIG 1 PER PK

## (undated) DEVICE — DRAPE,REIN 53X77,STERILE: Brand: MEDLINE

## (undated) DEVICE — CATHETER BLLN SPRINTER OTW 2MMX12MM 138CM

## (undated) DEVICE — SOL IRRIGATION INJ NACL 0.9% 500ML BTL

## (undated) DEVICE — KIT MFLD ISOLATN NACL CNTRST PRT TBNG SPIK W/ PRSS TRNSDUC

## (undated) DEVICE — PADPRO DEFIBRILLATION/PACING/CARDIOVERSION/MONITORING ELECTRODES, ADULT/CHILD GREATER THAN 10 KG RADIOTRANSPARENT ELECTRODE, PHYSIO-CONTROL QUIK-COMBO (M) 60" (152 CM): Brand: PADPRO

## (undated) DEVICE — HI-TORQUE VERSACORE MODIFIED J GUIDE WIRE SYSTEM 260 CM: Brand: HI-TORQUE VERSACORE

## (undated) DEVICE — BIPOLAR FORCEPS CORD: Brand: VALLEYLAB

## (undated) DEVICE — STERILE POLYISOPRENE POWDER-FREE SURGICAL GLOVES: Brand: PROTEXIS

## (undated) DEVICE — SPONGE GZ W6XL6IN COT 6 PLY SUP FLUF EXTRA ABSRB FOR PRE OP

## (undated) DEVICE — CUSTOM KT PTCA INFL DEV K05 00052M

## (undated) DEVICE — ANGIO-SEAL VIP VASCULAR CLOSURE DEVICE: Brand: ANGIO-SEAL

## (undated) DEVICE — BNDG ELAS HK LOOP 4X5YD NS -- MATRIX

## (undated) DEVICE — (D)PREP SKN CHLRAPRP APPL 26ML -- CONVERT TO ITEM 371833

## (undated) DEVICE — TOWEL,OR,DSP,ST,BLUE,STD,2/PK,40PK/CS: Brand: MEDLINE

## (undated) DEVICE — CATH GUID COR AL75 6FR 100CM -- LAUNCHER

## (undated) DEVICE — PAD,NON-ADHERENT,W/AD,3X4,ST,LF,1/PK: Brand: MEDLINE

## (undated) DEVICE — INTENDED FOR TISSUE SEPARATION, AND OTHER PROCEDURES THAT REQUIRE A SHARP SURGICAL BLADE TO PUNCTURE OR CUT.: Brand: BARD-PARKER ® CARBON RIB-BACK BLADES

## (undated) DEVICE — ARGYLE FRAZIER SURGICAL SUCTION INSTRUMENT 10 FR/CH (3.3 MM): Brand: ARGYLE

## (undated) DEVICE — GOWN,SIRUS,NONRNF,SETINSLV,2XL,18/CS: Brand: MEDLINE

## (undated) DEVICE — CRADLE BOOT: Brand: DEROYAL

## (undated) DEVICE — INFECTION CONTROL KIT SYS

## (undated) DEVICE — MEDI-VAC NON-CONDUCTIVE SUCTION TUBING: Brand: CARDINAL HEALTH

## (undated) DEVICE — PINNACLE INTRODUCER SHEATH: Brand: PINNACLE

## (undated) DEVICE — STRAP,POSITIONING,KNEE/BODY,FOAM,4X60": Brand: MEDLINE

## (undated) DEVICE — BANDAGE,GAUZE,BULKEE II,4.5"X4.1YD,STRL: Brand: MEDLINE

## (undated) DEVICE — INTENDED FOR TISSUE SEPARATION, AND OTHER PROCEDURES THAT REQUIRE A SHARP SURGICAL BLADE TO PUNCTURE OR CUT.: Brand: BARD-PARKER ®  SAFETY SCALPED

## (undated) DEVICE — PACK PROCEDURE SURG HRT CATH

## (undated) DEVICE — COVER US PRB W15XL120CM W/ GEL RUBBERBAND TAPE STRP FLD GEN

## (undated) DEVICE — DRAPE,U/ SHT,SPLIT,PLAS,STERIL: Brand: MEDLINE

## (undated) DEVICE — CATHETER GUID AD 6FR L100CM 0.071IN COR AMPLATZ L 0.75 NYL

## (undated) DEVICE — ZIMMER® STERILE DISPOSABLE TOURNIQUET CUFF WITH PROTECTIVE SLEEVE AND PLC, DUAL PORT, SINGLE BLADDER, 18 IN. (46 CM)

## (undated) DEVICE — SUT ETHLN 4-0 18IN PS2 BLK --

## (undated) DEVICE — COPILOT BLEEDBACK CONTROL VALVE: Brand: COPILOT

## (undated) DEVICE — HAND I-LF: Brand: MEDLINE INDUSTRIES, INC.

## (undated) DEVICE — 1010 S-DRAPE TOWEL DRAPE 10/BX: Brand: STERI-DRAPE™